# Patient Record
Sex: MALE | Race: WHITE | Employment: OTHER | URBAN - NONMETROPOLITAN AREA
[De-identification: names, ages, dates, MRNs, and addresses within clinical notes are randomized per-mention and may not be internally consistent; named-entity substitution may affect disease eponyms.]

---

## 2017-05-29 ENCOUNTER — APPOINTMENT (OUTPATIENT)
Dept: GENERAL RADIOLOGY | Age: 57
End: 2017-05-29
Payer: MEDICARE

## 2017-05-29 ENCOUNTER — HOSPITAL ENCOUNTER (EMERGENCY)
Age: 57
Discharge: HOME | End: 2017-05-29
Attending: EMERGENCY MEDICINE
Payer: MEDICARE

## 2017-05-29 VITALS
HEART RATE: 87 BPM | WEIGHT: 135 LBS | OXYGEN SATURATION: 96 % | RESPIRATION RATE: 28 BRPM | SYSTOLIC BLOOD PRESSURE: 154 MMHG | TEMPERATURE: 98.2 F | DIASTOLIC BLOOD PRESSURE: 81 MMHG | BODY MASS INDEX: 19.99 KG/M2 | HEIGHT: 69 IN

## 2017-05-29 DIAGNOSIS — J18.9 PNEUMONIA DUE TO ORGANISM: Primary | ICD-10-CM

## 2017-05-29 DIAGNOSIS — E87.1 HYPONATREMIA: ICD-10-CM

## 2017-05-29 DIAGNOSIS — J44.1 CHRONIC OBSTRUCTIVE PULMONARY DISEASE WITH ACUTE EXACERBATION (HCC): ICD-10-CM

## 2017-05-29 LAB
ALBUMIN SERPL-MCNC: 4.3 G/DL (ref 3.5–5.2)
ALP BLD-CCNC: 67 U/L (ref 40–130)
ALT SERPL-CCNC: 13 U/L (ref 5–41)
ANION GAP SERPL CALCULATED.3IONS-SCNC: 13 MMOL/L (ref 7–19)
AST SERPL-CCNC: 17 U/L (ref 5–40)
BASE EXCESS ARTERIAL: -0.5 MMOL/L (ref -2–2)
BASOPHILS ABSOLUTE: 0 K/UL (ref 0–0.2)
BASOPHILS RELATIVE PERCENT: 0.1 % (ref 0–1)
BILIRUB SERPL-MCNC: 0.8 MG/DL (ref 0.2–1.2)
BUN BLDV-MCNC: 6 MG/DL (ref 6–20)
CALCIUM SERPL-MCNC: 9.4 MG/DL (ref 8.6–10)
CARBOXYHEMOGLOBIN ARTERIAL: 7.7 % (ref 0–5)
CHLORIDE BLD-SCNC: 90 MMOL/L (ref 98–111)
CO2: 24 MMOL/L (ref 22–29)
CREAT SERPL-MCNC: 0.6 MG/DL (ref 0.5–1.2)
EOSINOPHILS ABSOLUTE: 0 K/UL (ref 0–0.6)
EOSINOPHILS RELATIVE PERCENT: 0 % (ref 0–5)
GFR NON-AFRICAN AMERICAN: >60
GLUCOSE BLD-MCNC: 125 MG/DL (ref 74–109)
HCO3 ARTERIAL: 23.1 MMOL/L (ref 22–26)
HCT VFR BLD CALC: 42.1 % (ref 42–52)
HEMOGLOBIN, ART, EXTENDED: 13.8 G/DL (ref 14–18)
HEMOGLOBIN: 14.2 G/DL (ref 14–18)
INR BLD: 1.05 (ref 0.88–1.18)
LACTIC ACID: 1.7 MG/DL (ref 0.5–1.9)
LYMPHOCYTES ABSOLUTE: 0.6 K/UL (ref 1.1–4.5)
LYMPHOCYTES RELATIVE PERCENT: 3.3 % (ref 20–40)
MCH RBC QN AUTO: 31.3 PG (ref 27–31)
MCHC RBC AUTO-ENTMCNC: 33.7 G/DL (ref 33–37)
MCV RBC AUTO: 92.9 FL (ref 80–94)
METHEMOGLOBIN ARTERIAL: 1.1 %
MONOCYTES ABSOLUTE: 1.4 K/UL (ref 0–0.9)
MONOCYTES RELATIVE PERCENT: 8.3 % (ref 0–10)
NEUTROPHILS ABSOLUTE: 14.9 K/UL (ref 1.5–7.5)
NEUTROPHILS RELATIVE PERCENT: 87.9 % (ref 50–65)
O2 CONTENT ARTERIAL: 17.5 ML/DL
O2 SAT, ARTERIAL: 90.2 %
O2 THERAPY: ABNORMAL
PCO2 ARTERIAL: 34 MMHG (ref 35–45)
PDW BLD-RTO: 13.2 % (ref 11.5–14.5)
PERFORMED ON: NORMAL
PH ARTERIAL: 7.44 (ref 7.35–7.45)
PLATELET # BLD: 246 K/UL (ref 130–400)
PMV BLD AUTO: 10.3 FL (ref 7.4–10.4)
PO2 ARTERIAL: 78 MMHG (ref 80–100)
POC TROPONIN I: 0 NG/ML (ref 0–0.08)
POTASSIUM SERPL-SCNC: 4.1 MMOL/L (ref 3.5–5)
POTASSIUM, WHOLE BLOOD: 3.9
PRO-BNP: 169 PG/ML (ref 0–900)
PROTHROMBIN TIME: 13.6 SEC (ref 12–14.6)
RBC # BLD: 4.53 M/UL (ref 4.7–6.1)
SODIUM BLD-SCNC: 127 MMOL/L (ref 136–145)
TOTAL PROTEIN: 7.3 G/DL (ref 6.6–8.7)
WBC # BLD: 16.9 K/UL (ref 4.8–10.8)

## 2017-05-29 PROCEDURE — 96365 THER/PROPH/DIAG IV INF INIT: CPT

## 2017-05-29 PROCEDURE — 85025 COMPLETE CBC W/AUTO DIFF WBC: CPT

## 2017-05-29 PROCEDURE — 93005 ELECTROCARDIOGRAM TRACING: CPT

## 2017-05-29 PROCEDURE — 83880 ASSAY OF NATRIURETIC PEPTIDE: CPT

## 2017-05-29 PROCEDURE — 80053 COMPREHEN METABOLIC PANEL: CPT

## 2017-05-29 PROCEDURE — 96375 TX/PRO/DX INJ NEW DRUG ADDON: CPT

## 2017-05-29 PROCEDURE — 85610 PROTHROMBIN TIME: CPT

## 2017-05-29 PROCEDURE — 99284 EMERGENCY DEPT VISIT MOD MDM: CPT | Performed by: EMERGENCY MEDICINE

## 2017-05-29 PROCEDURE — 87040 BLOOD CULTURE FOR BACTERIA: CPT

## 2017-05-29 PROCEDURE — 84484 ASSAY OF TROPONIN QUANT: CPT

## 2017-05-29 PROCEDURE — 99285 EMERGENCY DEPT VISIT HI MDM: CPT

## 2017-05-29 PROCEDURE — 36600 WITHDRAWAL OF ARTERIAL BLOOD: CPT

## 2017-05-29 PROCEDURE — 36415 COLL VENOUS BLD VENIPUNCTURE: CPT

## 2017-05-29 PROCEDURE — 96366 THER/PROPH/DIAG IV INF ADDON: CPT

## 2017-05-29 PROCEDURE — 82803 BLOOD GASES ANY COMBINATION: CPT

## 2017-05-29 PROCEDURE — 94640 AIRWAY INHALATION TREATMENT: CPT

## 2017-05-29 PROCEDURE — 71020 XR CHEST STANDARD TWO VW: CPT

## 2017-05-29 PROCEDURE — 84132 ASSAY OF SERUM POTASSIUM: CPT

## 2017-05-29 PROCEDURE — 83605 ASSAY OF LACTIC ACID: CPT

## 2017-05-29 PROCEDURE — 2580000003 HC RX 258: Performed by: EMERGENCY MEDICINE

## 2017-05-29 PROCEDURE — 6370000000 HC RX 637 (ALT 250 FOR IP): Performed by: EMERGENCY MEDICINE

## 2017-05-29 PROCEDURE — 6360000002 HC RX W HCPCS: Performed by: EMERGENCY MEDICINE

## 2017-05-29 RX ORDER — LEVOFLOXACIN 750 MG/1
750 TABLET ORAL DAILY
Qty: 10 TABLET | Refills: 0 | Status: SHIPPED | OUTPATIENT
Start: 2017-05-29 | End: 2017-06-08

## 2017-05-29 RX ORDER — 0.9 % SODIUM CHLORIDE 0.9 %
1000 INTRAVENOUS SOLUTION INTRAVENOUS ONCE
Status: COMPLETED | OUTPATIENT
Start: 2017-05-29 | End: 2017-05-29

## 2017-05-29 RX ORDER — PREDNISONE 50 MG/1
50 TABLET ORAL DAILY
Qty: 5 TABLET | Refills: 0 | Status: SHIPPED | OUTPATIENT
Start: 2017-05-29 | End: 2017-06-03

## 2017-05-29 RX ORDER — METHYLPREDNISOLONE SODIUM SUCCINATE 125 MG/2ML
80 INJECTION, POWDER, LYOPHILIZED, FOR SOLUTION INTRAMUSCULAR; INTRAVENOUS ONCE
Status: COMPLETED | OUTPATIENT
Start: 2017-05-29 | End: 2017-05-29

## 2017-05-29 RX ORDER — IPRATROPIUM BROMIDE AND ALBUTEROL SULFATE 2.5; .5 MG/3ML; MG/3ML
1 SOLUTION RESPIRATORY (INHALATION) ONCE
Status: COMPLETED | OUTPATIENT
Start: 2017-05-29 | End: 2017-05-29

## 2017-05-29 RX ORDER — LEVOFLOXACIN 5 MG/ML
500 INJECTION, SOLUTION INTRAVENOUS ONCE
Status: COMPLETED | OUTPATIENT
Start: 2017-05-29 | End: 2017-05-29

## 2017-05-29 RX ADMIN — METHYLPREDNISOLONE SODIUM SUCCINATE 80 MG: 125 INJECTION, POWDER, FOR SOLUTION INTRAMUSCULAR; INTRAVENOUS at 11:34

## 2017-05-29 RX ADMIN — LEVOFLOXACIN 500 MG: 5 INJECTION, SOLUTION INTRAVENOUS at 11:36

## 2017-05-29 RX ADMIN — IPRATROPIUM BROMIDE AND ALBUTEROL SULFATE 1 AMPULE: .5; 3 SOLUTION RESPIRATORY (INHALATION) at 09:11

## 2017-05-29 RX ADMIN — SODIUM CHLORIDE 1000 ML: 9 INJECTION, SOLUTION INTRAVENOUS at 11:33

## 2017-05-29 ASSESSMENT — ENCOUNTER SYMPTOMS
ABDOMINAL PAIN: 0
SHORTNESS OF BREATH: 1
COUGH: 1
BACK PAIN: 0

## 2017-05-31 LAB
EKG P AXIS: 79 DEGREES
EKG P-R INTERVAL: 148 MS
EKG Q-T INTERVAL: 346 MS
EKG QRS DURATION: 84 MS
EKG QTC CALCULATION (BAZETT): 409 MS
EKG T AXIS: 82 DEGREES

## 2017-06-03 LAB
BLOOD CULTURE, ROUTINE: NORMAL
CULTURE, BLOOD 2: NORMAL

## 2017-06-06 ENCOUNTER — HOSPITAL ENCOUNTER (OUTPATIENT)
Dept: CT IMAGING | Age: 57
Discharge: HOME | End: 2017-06-06
Payer: MEDICARE

## 2017-06-06 DIAGNOSIS — J44.9 CHRONIC OBSTRUCTIVE PULMONARY DISEASE, UNSPECIFIED COPD TYPE (HCC): ICD-10-CM

## 2017-06-06 PROCEDURE — 71250 CT THORAX DX C-: CPT

## 2017-10-10 ENCOUNTER — HOSPITAL ENCOUNTER (OUTPATIENT)
Dept: CT IMAGING | Age: 57
Discharge: HOME | End: 2017-10-10
Payer: MEDICARE

## 2017-10-10 DIAGNOSIS — J96.11 CHRONIC RESPIRATORY FAILURE WITH HYPOXIA (HCC): ICD-10-CM

## 2017-10-10 PROCEDURE — 71250 CT THORAX DX C-: CPT

## 2018-07-05 ENCOUNTER — HOSPITAL ENCOUNTER (OUTPATIENT)
Dept: CT IMAGING | Age: 58
Discharge: HOME | End: 2018-07-05
Payer: MEDICARE

## 2018-07-05 ENCOUNTER — HOSPITAL ENCOUNTER (OUTPATIENT)
Dept: GENERAL RADIOLOGY | Age: 58
Discharge: HOME | End: 2018-07-05
Payer: MEDICARE

## 2018-07-05 DIAGNOSIS — Z12.5 ENCOUNTER FOR PROSTATE CANCER SCREENING: ICD-10-CM

## 2018-07-05 DIAGNOSIS — I10 ESSENTIAL HYPERTENSION, MALIGNANT: ICD-10-CM

## 2018-07-05 DIAGNOSIS — E78.2 MIXED HYPERLIPIDEMIA: ICD-10-CM

## 2018-07-05 DIAGNOSIS — R63.4 LOSS OF WEIGHT: ICD-10-CM

## 2018-07-05 DIAGNOSIS — J44.9 OBSTRUCTIVE CHRONIC BRONCHITIS WITHOUT EXACERBATION (HCC): ICD-10-CM

## 2018-07-05 LAB
GFR NON-AFRICAN AMERICAN: >60
PERFORMED ON: NORMAL
POC CREATININE: 0.6 MG/DL (ref 0.3–1.3)
POC SAMPLE TYPE: NORMAL

## 2018-07-05 PROCEDURE — 82565 ASSAY OF CREATININE: CPT

## 2018-07-05 PROCEDURE — 71046 X-RAY EXAM CHEST 2 VIEWS: CPT

## 2018-07-05 PROCEDURE — 74178 CT ABD&PLV WO CNTR FLWD CNTR: CPT

## 2018-07-05 PROCEDURE — 6360000004 HC RX CONTRAST MEDICATION: Performed by: FAMILY MEDICINE

## 2018-07-05 RX ADMIN — IOPAMIDOL 90 ML: 755 INJECTION, SOLUTION INTRAVENOUS at 13:03

## 2018-07-10 ENCOUNTER — HOSPITAL ENCOUNTER (OUTPATIENT)
Dept: ULTRASOUND IMAGING | Age: 58
Discharge: HOME | End: 2018-07-10
Payer: MEDICARE

## 2018-07-10 ENCOUNTER — HOSPITAL ENCOUNTER (OUTPATIENT)
Dept: CT IMAGING | Age: 58
Discharge: HOME | End: 2018-07-10
Payer: MEDICARE

## 2018-07-10 DIAGNOSIS — R93.5 ABNORMAL FINDINGS ON DIAGNOSTIC IMAGING OF ABDOMEN: ICD-10-CM

## 2018-07-10 DIAGNOSIS — J42 CHRONIC BRONCHITIS, UNSPECIFIED CHRONIC BRONCHITIS TYPE (HCC): ICD-10-CM

## 2018-07-10 PROCEDURE — 71250 CT THORAX DX C-: CPT

## 2018-07-10 PROCEDURE — 76705 ECHO EXAM OF ABDOMEN: CPT

## 2018-08-07 ENCOUNTER — HOSPITAL ENCOUNTER (INPATIENT)
Age: 58
LOS: 6 days | Discharge: HOME | DRG: 193 | End: 2018-08-14
Attending: EMERGENCY MEDICINE | Admitting: FAMILY MEDICINE
Payer: MEDICARE

## 2018-08-07 ENCOUNTER — APPOINTMENT (OUTPATIENT)
Dept: CT IMAGING | Age: 58
DRG: 193 | End: 2018-08-07
Payer: MEDICARE

## 2018-08-07 ENCOUNTER — APPOINTMENT (OUTPATIENT)
Dept: GENERAL RADIOLOGY | Age: 58
DRG: 193 | End: 2018-08-07
Payer: MEDICARE

## 2018-08-07 DIAGNOSIS — R93.89 ABNORMAL CT SCAN: ICD-10-CM

## 2018-08-07 DIAGNOSIS — J18.9 PNEUMONIA DUE TO ORGANISM: ICD-10-CM

## 2018-08-07 DIAGNOSIS — R06.00 DYSPNEA, UNSPECIFIED TYPE: Primary | ICD-10-CM

## 2018-08-07 LAB
ALBUMIN SERPL-MCNC: 4.1 G/DL (ref 3.5–5.2)
ALP BLD-CCNC: 85 U/L (ref 40–130)
ALT SERPL-CCNC: 16 U/L (ref 5–41)
ANION GAP SERPL CALCULATED.3IONS-SCNC: 11 MMOL/L (ref 7–19)
AST SERPL-CCNC: 19 U/L (ref 5–40)
BASE EXCESS ARTERIAL: 3 MMOL/L (ref -2–2)
BILIRUB SERPL-MCNC: 0.6 MG/DL (ref 0.2–1.2)
BUN BLDV-MCNC: 5 MG/DL (ref 8–23)
CALCIUM SERPL-MCNC: 9.8 MG/DL (ref 8.8–10.2)
CARBOXYHEMOGLOBIN ARTERIAL: 7.8 % (ref 0–5)
CHLORIDE BLD-SCNC: 92 MMOL/L (ref 98–111)
CO2: 29 MMOL/L (ref 22–29)
CREAT SERPL-MCNC: 0.5 MG/DL (ref 0.5–1.2)
D DIMER: 0.58 UG/ML FEU (ref 0–0.48)
GFR NON-AFRICAN AMERICAN: >60
GLUCOSE BLD-MCNC: 104 MG/DL (ref 74–109)
HCO3 ARTERIAL: 27.1 MMOL/L (ref 22–26)
HCT VFR BLD CALC: 42.7 % (ref 42–52)
HEMOGLOBIN, ART, EXTENDED: 12.3 G/DL (ref 14–18)
HEMOGLOBIN: 13.6 G/DL (ref 14–18)
MCH RBC QN AUTO: 28.5 PG (ref 27–31)
MCHC RBC AUTO-ENTMCNC: 31.9 G/DL (ref 33–37)
MCV RBC AUTO: 89.5 FL (ref 80–94)
METHEMOGLOBIN ARTERIAL: 1.7 %
O2 CONTENT ARTERIAL: 15.6 ML/DL
O2 SAT, ARTERIAL: 89.5 %
O2 THERAPY: ABNORMAL
PCO2 ARTERIAL: 39 MMHG (ref 35–45)
PDW BLD-RTO: 13.4 % (ref 11.5–14.5)
PH ARTERIAL: 7.45 (ref 7.35–7.45)
PLATELET # BLD: 303 K/UL (ref 130–400)
PMV BLD AUTO: 10.4 FL (ref 9.4–12.4)
PO2 ARTERIAL: 92 MMHG (ref 80–100)
POTASSIUM SERPL-SCNC: 4.1 MMOL/L (ref 3.5–5)
POTASSIUM, WHOLE BLOOD: 3.8
RBC # BLD: 4.77 M/UL (ref 4.7–6.1)
SODIUM BLD-SCNC: 132 MMOL/L (ref 136–145)
TOTAL PROTEIN: 7.3 G/DL (ref 6.6–8.7)
TROPONIN: <0.01 NG/ML (ref 0–0.03)
WBC # BLD: 14.5 K/UL (ref 4.8–10.8)

## 2018-08-07 PROCEDURE — 93005 ELECTROCARDIOGRAM TRACING: CPT

## 2018-08-07 PROCEDURE — 85379 FIBRIN DEGRADATION QUANT: CPT

## 2018-08-07 PROCEDURE — 36415 COLL VENOUS BLD VENIPUNCTURE: CPT

## 2018-08-07 PROCEDURE — 36600 WITHDRAWAL OF ARTERIAL BLOOD: CPT

## 2018-08-07 PROCEDURE — 71046 X-RAY EXAM CHEST 2 VIEWS: CPT

## 2018-08-07 PROCEDURE — 85027 COMPLETE CBC AUTOMATED: CPT

## 2018-08-07 PROCEDURE — 80053 COMPREHEN METABOLIC PANEL: CPT

## 2018-08-07 PROCEDURE — 71275 CT ANGIOGRAPHY CHEST: CPT

## 2018-08-07 PROCEDURE — 84484 ASSAY OF TROPONIN QUANT: CPT

## 2018-08-07 PROCEDURE — 99285 EMERGENCY DEPT VISIT HI MDM: CPT

## 2018-08-07 PROCEDURE — 6360000004 HC RX CONTRAST MEDICATION: Performed by: EMERGENCY MEDICINE

## 2018-08-07 RX ADMIN — IOPAMIDOL 90 ML: 755 INJECTION, SOLUTION INTRAVENOUS at 23:57

## 2018-08-08 PROBLEM — R63.4 ABNORMAL WEIGHT LOSS: Status: ACTIVE | Noted: 2018-08-08

## 2018-08-08 PROBLEM — J18.9 PNEUMONIA: Status: ACTIVE | Noted: 2018-08-08

## 2018-08-08 LAB
BILIRUBIN URINE: NEGATIVE
BLOOD, URINE: NEGATIVE
CLARITY: CLEAR
COLOR: YELLOW
GLUCOSE URINE: NEGATIVE MG/DL
KETONES, URINE: NEGATIVE MG/DL
LEUKOCYTE ESTERASE, URINE: NEGATIVE
NITRITE, URINE: NEGATIVE
PH UA: 7.5
PREALBUMIN: 11 MG/DL (ref 20–40)
PROTEIN UA: NEGATIVE MG/DL
SPECIFIC GRAVITY UA: 1.01
URINE REFLEX TO CULTURE: NORMAL
UROBILINOGEN, URINE: 1 E.U./DL

## 2018-08-08 PROCEDURE — 94762 N-INVAS EAR/PLS OXIMTRY CONT: CPT

## 2018-08-08 PROCEDURE — 6360000002 HC RX W HCPCS: Performed by: EMERGENCY MEDICINE

## 2018-08-08 PROCEDURE — 1210000000 HC MED SURG R&B

## 2018-08-08 PROCEDURE — 2580000003 HC RX 258: Performed by: EMERGENCY MEDICINE

## 2018-08-08 PROCEDURE — 84134 ASSAY OF PREALBUMIN: CPT

## 2018-08-08 PROCEDURE — 87070 CULTURE OTHR SPECIMN AEROBIC: CPT

## 2018-08-08 PROCEDURE — 36415 COLL VENOUS BLD VENIPUNCTURE: CPT

## 2018-08-08 PROCEDURE — 87385 HISTOPLASMA CAPSUL AG IA: CPT

## 2018-08-08 PROCEDURE — 81003 URINALYSIS AUTO W/O SCOPE: CPT

## 2018-08-08 PROCEDURE — 84132 ASSAY OF SERUM POTASSIUM: CPT

## 2018-08-08 PROCEDURE — 87449 NOS EACH ORGANISM AG IA: CPT

## 2018-08-08 PROCEDURE — 94640 AIRWAY INHALATION TREATMENT: CPT

## 2018-08-08 PROCEDURE — 99285 EMERGENCY DEPT VISIT HI MDM: CPT | Performed by: EMERGENCY MEDICINE

## 2018-08-08 PROCEDURE — 6370000000 HC RX 637 (ALT 250 FOR IP): Performed by: FAMILY MEDICINE

## 2018-08-08 PROCEDURE — 96374 THER/PROPH/DIAG INJ IV PUSH: CPT

## 2018-08-08 PROCEDURE — 87205 SMEAR GRAM STAIN: CPT

## 2018-08-08 PROCEDURE — 2700000000 HC OXYGEN THERAPY PER DAY

## 2018-08-08 PROCEDURE — 87040 BLOOD CULTURE FOR BACTERIA: CPT

## 2018-08-08 PROCEDURE — 2580000003 HC RX 258: Performed by: FAMILY MEDICINE

## 2018-08-08 PROCEDURE — 6360000002 HC RX W HCPCS: Performed by: FAMILY MEDICINE

## 2018-08-08 PROCEDURE — 82803 BLOOD GASES ANY COMBINATION: CPT

## 2018-08-08 RX ORDER — NICOTINE 21 MG/24HR
1 PATCH, TRANSDERMAL 24 HOURS TRANSDERMAL DAILY
Status: DISCONTINUED | OUTPATIENT
Start: 2018-08-08 | End: 2018-08-14 | Stop reason: HOSPADM

## 2018-08-08 RX ORDER — SODIUM CHLORIDE 0.9 % (FLUSH) 0.9 %
10 SYRINGE (ML) INJECTION PRN
Status: DISCONTINUED | OUTPATIENT
Start: 2018-08-08 | End: 2018-08-14 | Stop reason: HOSPADM

## 2018-08-08 RX ORDER — CITALOPRAM 40 MG/1
40 TABLET ORAL DAILY
Status: DISCONTINUED | OUTPATIENT
Start: 2018-08-08 | End: 2018-08-10

## 2018-08-08 RX ORDER — ALBUTEROL SULFATE 2.5 MG/3ML
2.5 SOLUTION RESPIRATORY (INHALATION) EVERY 6 HOURS PRN
Status: DISCONTINUED | OUTPATIENT
Start: 2018-08-08 | End: 2018-08-14 | Stop reason: HOSPADM

## 2018-08-08 RX ORDER — LORAZEPAM 0.5 MG/1
0.5 TABLET ORAL EVERY 4 HOURS PRN
Status: DISCONTINUED | OUTPATIENT
Start: 2018-08-08 | End: 2018-08-14 | Stop reason: HOSPADM

## 2018-08-08 RX ORDER — GUAIFENESIN 600 MG/1
1200 TABLET, EXTENDED RELEASE ORAL 2 TIMES DAILY
Status: DISCONTINUED | OUTPATIENT
Start: 2018-08-08 | End: 2018-08-14 | Stop reason: HOSPADM

## 2018-08-08 RX ORDER — IPRATROPIUM BROMIDE AND ALBUTEROL SULFATE 2.5; .5 MG/3ML; MG/3ML
1 SOLUTION RESPIRATORY (INHALATION)
Status: DISCONTINUED | OUTPATIENT
Start: 2018-08-08 | End: 2018-08-14 | Stop reason: HOSPADM

## 2018-08-08 RX ORDER — LISINOPRIL 10 MG/1
10 TABLET ORAL DAILY
Status: DISCONTINUED | OUTPATIENT
Start: 2018-08-08 | End: 2018-08-10

## 2018-08-08 RX ORDER — ACETAMINOPHEN 325 MG/1
650 TABLET ORAL EVERY 4 HOURS PRN
Status: DISCONTINUED | OUTPATIENT
Start: 2018-08-08 | End: 2018-08-14 | Stop reason: HOSPADM

## 2018-08-08 RX ORDER — METHYLPREDNISOLONE SODIUM SUCCINATE 125 MG/2ML
80 INJECTION, POWDER, LYOPHILIZED, FOR SOLUTION INTRAMUSCULAR; INTRAVENOUS EVERY 8 HOURS
Status: DISCONTINUED | OUTPATIENT
Start: 2018-08-08 | End: 2018-08-10

## 2018-08-08 RX ORDER — SODIUM CHLORIDE 0.9 % (FLUSH) 0.9 %
10 SYRINGE (ML) INJECTION EVERY 12 HOURS SCHEDULED
Status: DISCONTINUED | OUTPATIENT
Start: 2018-08-08 | End: 2018-08-14 | Stop reason: HOSPADM

## 2018-08-08 RX ORDER — LEVOFLOXACIN 5 MG/ML
750 INJECTION, SOLUTION INTRAVENOUS ONCE
Status: COMPLETED | OUTPATIENT
Start: 2018-08-08 | End: 2018-08-08

## 2018-08-08 RX ADMIN — CEFEPIME HYDROCHLORIDE 2 G: 2 INJECTION, POWDER, FOR SOLUTION INTRAVENOUS at 01:24

## 2018-08-08 RX ADMIN — IPRATROPIUM BROMIDE AND ALBUTEROL SULFATE 1 AMPULE: .5; 3 SOLUTION RESPIRATORY (INHALATION) at 15:39

## 2018-08-08 RX ADMIN — IPRATROPIUM BROMIDE AND ALBUTEROL SULFATE 1 AMPULE: .5; 3 SOLUTION RESPIRATORY (INHALATION) at 07:53

## 2018-08-08 RX ADMIN — Medication 2 G: at 10:26

## 2018-08-08 RX ADMIN — ROFLUMILAST 500 MCG: 500 TABLET ORAL at 11:15

## 2018-08-08 RX ADMIN — IPRATROPIUM BROMIDE AND ALBUTEROL SULFATE 1 AMPULE: .5; 3 SOLUTION RESPIRATORY (INHALATION) at 11:07

## 2018-08-08 RX ADMIN — Medication 2 G: at 17:48

## 2018-08-08 RX ADMIN — ALBUTEROL SULFATE 2.5 MG: 2.5 SOLUTION RESPIRATORY (INHALATION) at 05:18

## 2018-08-08 RX ADMIN — Medication 10 ML: at 20:42

## 2018-08-08 RX ADMIN — IPRATROPIUM BROMIDE AND ALBUTEROL SULFATE 1 AMPULE: .5; 3 SOLUTION RESPIRATORY (INHALATION) at 18:17

## 2018-08-08 RX ADMIN — CITALOPRAM HYDROBROMIDE 40 MG: 40 TABLET ORAL at 10:31

## 2018-08-08 RX ADMIN — Medication 10 ML: at 10:32

## 2018-08-08 RX ADMIN — GUAIFENESIN 1200 MG: 600 TABLET, EXTENDED RELEASE ORAL at 20:42

## 2018-08-08 RX ADMIN — GUAIFENESIN 1200 MG: 600 TABLET, EXTENDED RELEASE ORAL at 10:25

## 2018-08-08 RX ADMIN — LEVOFLOXACIN 750 MG: 5 INJECTION, SOLUTION INTRAVENOUS at 01:25

## 2018-08-08 RX ADMIN — LORAZEPAM 0.5 MG: 0.5 TABLET ORAL at 20:42

## 2018-08-08 RX ADMIN — METHYLPREDNISOLONE SODIUM SUCCINATE 80 MG: 125 INJECTION, POWDER, FOR SOLUTION INTRAMUSCULAR; INTRAVENOUS at 17:47

## 2018-08-08 RX ADMIN — METHYLPREDNISOLONE SODIUM SUCCINATE 80 MG: 125 INJECTION, POWDER, FOR SOLUTION INTRAMUSCULAR; INTRAVENOUS at 10:26

## 2018-08-08 ASSESSMENT — ENCOUNTER SYMPTOMS
EYE PAIN: 0
DIARRHEA: 0
SHORTNESS OF BREATH: 1
VOMITING: 0
ABDOMINAL PAIN: 0

## 2018-08-08 NOTE — H&P
Inhale 1 puff into the lungs daily   mometasone-formoterol (DULERA) 200-5 MCG/ACT inhaler, Inhale 2 puffs into the lungs 2 times daily  Roflumilast (DALIRESP) 500 MCG tablet, Take 500 mcg by mouth daily    Allergies:    Pcn [penicillins]    Social History:    reports that he has been smoking Cigarettes. He has a 30.00 pack-year smoking history. He has never used smokeless tobacco. He reports that he does not drink alcohol or use drugs. Family History:   family history includes Colon Cancer in his mother; Colon Polyps in his mother; Diabetes in his father; Emphysema in his father; Prostate Cancer in his paternal grandfather. REVIEW OF SYSTEMS:  As above in the HPI, otherwise negative    PHYSICAL EXAM:    Vitals:  BP 97/62   Pulse 91   Temp 98.7 °F (37.1 °C) (Temporal)   Resp 18   Ht 5' 9\" (1.753 m)   Wt 112 lb (50.8 kg)   SpO2 96%   BMI 16.54 kg/m²     General Appearance:  alert, cooperative and appears older than stated age  Skin:  negatives: mobility and turgor normal  Eyes:  No gross abnormalities. Neck:  neck- supple, no mass, non-tender  Lungs:  Breathing Pattern: use of accessory muscles, Breath sounds: diminished breath sounds- throughout and diffuse  Heart:  Heart regular rate and rhythm  Abdomen: Auscultation: Normal bowel sounds. No bruits. Extremities: Extremities warm to touch, pink, with no edema.   Peripheral Pulses:  Normal  Neurologic:  negative    DATA:  CBC with Differential:    Lab Results   Component Value Date    WBC 14.5 08/07/2018    RBC 4.77 08/07/2018    HGB 13.6 08/07/2018    HCT 42.7 08/07/2018    HCT 46.5 02/09/2012     08/07/2018    PLT 90 02/09/2012    MCV 89.5 08/07/2018    MCH 28.5 08/07/2018    MCHC 31.9 08/07/2018    RDW 13.4 08/07/2018    LYMPHOPCT 3.3 05/29/2017    MONOPCT 8.3 05/29/2017    EOSPCT 0.8 02/09/2012    BASOPCT 0.1 05/29/2017    MONOSABS 1.40 05/29/2017    LYMPHSABS 0.6 05/29/2017    EOSABS 0.00 05/29/2017    BASOSABS 0.00 05/29/2017     CMP:

## 2018-08-08 NOTE — CONSULTS
chloride flush 10 mL Intravenous 2 times per day   enoxaparin 40 mg Subcutaneous Daily   Roflumilast 500 mcg Oral Daily   citalopram 40 mg Oral Daily   guaiFENesin 1,200 mg Oral BID   lisinopril 10 mg Oral Daily   nicotine 1 patch Transdermal Daily   ipratropium-albuterol 1 ampule Inhalation Q4H WA   cefepime 2 g Intravenous Q8H   methylPREDNISolone 80 mg Intravenous Q8H       Social History   reports that he has been smoking Cigarettes. He has a 30.00 pack-year smoking history. He has never used smokeless tobacco. He reports that he does not drink alcohol or use drugs. Family History  family history includes Colon Cancer in his mother; Colon Polyps in his mother; Diabetes in his father; Emphysema in his father; Prostate Cancer in his paternal grandfather. Review of Systems:  ROS   Constitutional: He denies fever or chills. He has had weight loss. HEENT: Negative. Eyes: Negative. Respiratory: He has had dyspnea and some cough but very minimal productive cough. Cardiac: Negative. GI: He has had weight loss. Extremities: Negative. Musculoskeletal: Negative. Neurologic: Negative. Neurologic: Negative. Psychiatric: He has been somewhat depressed recently as his mother had just passed away. Physical Exam:   height is 5' 9\" (1.753 m) and weight is 112 lb (50.8 kg). His temporal temperature is 98.7 °F (37.1 °C). His blood pressure is 97/62 and his pulse is 91. His respiration is 18 and oxygen saturation is 96%. Intake/Output Summary (Last 24 hours) at 08/08/18 1011  Last data filed at 08/08/18 0116   Gross per 24 hour   Intake                0 ml   Output              500 ml   Net             -500 ml     Physical Exam   Gen.: He is a chronically ill-appearing white male. HEENT: Nasal oxygen is in place. Eyes: Pupils are equal round reactive to light. Neck: Supple without JVD. Chest: Diminished breath sounds throughout some scattered expiratory rhonchi. Cardiac: No murmur or gallop noted.   Abdomen: fibrosis. Signed by Dr Taylor Fisher. Juan Miguelhoose on 8/8/2018 7:10 AM    Ct Chest Wo Contrast    Result Date: 7/10/2018  CT CHEST WO CONTRAST 7/10/2018 7:37 AM History: Bronchitis. Shortness of breath. In order to have a CT radiation dose as low as reasonably achievable Automated Exposure Control was utilized for adjustment of the mA and/or KV according to patient size. DLP in mGycm= 526. Noncontrast chest CT compared with 10/10/2017. Hyperexpansion. Significant progression in posterior right upper lobe pleural thickening and parenchymal opacification. Associated cavitary change. Associated bronchiectasis. No adjacent rib destruction. Chronic pleural thickening with calcified pleural plaques noted. Bilateral upper lobe scarring. No significant pleural effusion. Heart size is normal.    1. Posterior right upper lobe pleural thickening with parenchymal consolidation and associated cavitary change and bronchiectasis. 2. Chronic pleural disease with pneumonia favored over neoplasm though this represents significant progression as compared with 9 months ago. No pneumothorax or heart failure. No discrete new lung nodule or mass. Signed by Dr Lincoln Olivares on 7/10/2018 7:41 AM    Us Liver    Result Date: 7/10/2018  US LIVER 7/10/2018 7:30 AM History: Liver lesion on recent CT. Grayscale and color flow ultrasound evaluation of the liver shows an irregular 2-3 cm hyperechoic focus within the subdiaphragmatic left lobe which has the appearance of focal fatty infiltration. No significant hypervascularity at this site. Normal right kidney measuring 10.4 x 3.5 x 5.2 cm. Normal appearance of the spleen measuring approximately 9 x 4 cm. 1. The liver \"lesion\" noted on recent CT is felt to represent focal fatty infiltration. 2. No aggressive features. Signed by Dr Lincoln Olivares on 7/10/2018 7:36 AM    Cta Pulmonary W Contrast    Result Date: 8/8/2018  Examination. CTA PULMONARY W CONTRAST History: Acute chest pain.  DLP: 350 mGyc. This CT angiography of the chest is performed after intravenous contrast enhancement. The images are acquired in axial plane with subsequent reconstruction in coronal and sagittal planes. The comparison is made with the previous study dated 7/10/2018. There is excellent opacification of the main pulmonary artery, right and left pulmonary arteries and their branches bilaterally. There is no filling defect in the opacified pulmonary arterial bed. Atheromatous changes of the thoracic aorta are seen. No aneurysmal dilatation or dissection. Atheromatous changes of coronary arteries are seen. The RV/LV ratio is less than 1. No right ventricle strain. There is small pericardial effusion. There is no evidence of mediastinal or hilar mass or lymphadenopathy. The remaining lung fields are essentially unchanged since the previous study. A spiculated nodule in the left upper lobe posteriorly is reidentified with no change in size or shape. There is surrounding linear parenchymal strands extending towards the right hilum and the visceral pleura. An area of consolidation with cavitation is seen in the superior segment of the right lower lobe which is stable since the previous study. A bandlike area of parenchymal density with intrinsic calcification in the left apex posteriorly is stable. There are extensive chronic emphysematous changes. There are linear interstitial changes in the right upper lobe. Similar changes are seen in the posterior aspect of the right middle lobe. A few calcified granulomas are seen in the lungs bilaterally. The visualized thyroid gland appears normal. There is no axillary lymphadenopathy. The liver and spleen as visualized appear normal. Moderate fullness of left adrenal gland is stable. Chronic degenerative changes of the thoracic spine is seen with a mild dextroscoliosis. No evidence of pulmonary embolism. Atheromatous changes of thoracic aorta. No dissection.  Atheromatous changes of coronary arteries. The lungs bilaterally are essentially unchanged since the previous study dated 7/10/2018. The above study was initially reviewed and reported by [facility]. I do not find any discrepancies. Signed by Dr Carroll Leggett on 2018 7:38 AM    My radiograph interpretation/independent review of imaging: Chest CT shows severe fibrotic changes in the upper lung zones with cavitary component on the right. He also has emphysematous changes bilaterally. I don't sense any acute infiltrate and no pulmonary embolism was noted. Other test results (not lab or imaging): His most recent pulmonary function studies from the office showed a very severe obstructive ventilatory defect. In office notes were reviewed. Independent review of ekg: EKG reveals sinus rhythm with low voltage QRS right axis deviation. Problem list generated by [employer]:  Patient Active Problem List   Diagnosis    Family history of colon cancer    Family history of colonic polyps    Benign colon polyp    S/P colonoscopic polypectomy    Diverticulosis of colon    Loose stools    Abdominal cramping    History of colon polyps    Abnormal CT of the abdomen    Panlobular emphysema ([address]    Hyperlipidemia    Hypertension    Nicotine dependence, cigarettes, uncomplicated    Acute exacerbation of chronic obstructive airways disease ([address]    COPD (chronic obstructive pulmonary disease) ([address]    Pneumonia of right lower lobe due to Pseudomonas species ([address]    Pneumonia    Abnormal weight loss     Pulmonary Assessment:  New problem (to me), with additional workup planned:   1. Acute exacerbation of COPD. 2. Lung scarring. New problem (to me), no additional workup planned:  1. None. Other problems either stable, failing to improve or worsenin. Chronic respiratory failure with hypoxemia. 2. Chronic tobacco use.   3. Weight loss which may relate to his severe COPD    Recommend/plan:   · There was some question about possible bronchoscopy. He is clearly a very poor candidate for any invasive pulmonary workup based on the severity of COPD. I don't really see anything on his chest CT that would be accessible to standard bronchoscopy in terms of a biopsy. Again he would be a poor candidate for any sort of invasive pulmonary workup. I will get some serologic studies just to assess him for some possible superimposed process contributing to his chronic lung disease but again most likely has a combination of scarring from previous infectious or inflammatory process along with emphysema related to his chronic and persistent tobacco use and his prognosis is extremely poor.     Electronically signed by Markell Dodson on 08/08/18 at 10:11 AM

## 2018-08-08 NOTE — ED NOTES
ASSESSMENT:    PT ALERT/ORIENTED X4. PUPILS EQUAL/REACTIVE    SKIN:  WARM/DRY PINK CAPILLARY REFILL < 2SECS    CARDIAC:  S1 S2 NOTED. Patient placed on cardiac monitor, continuous pulse oximeter, and NIBP monitor. Monitor alarms on. LUNGS: CLEAR UPPER AND LOWER LOBES, PT C/O BECOMING OUT OF BREATH WITH MINIMAL ACTIVITY. PT ON HOME O2.      ABDOMEN: BOWEL SOUNDS NOTED UPPER AND LOWER QUADRANTS                     SOFT AND NONTENDER. EXTREMITIES:  BILATERAL DP AND PT AND NO EDEMA NOTED. NO DISTRESS NOTED. SIDE RAILS UP AND CALL LIGHT IN REACH.      Mady Dalal RN  08/07/18 5050

## 2018-08-08 NOTE — ED PROVIDER NOTES
may be scar or malignancy. Combination of consolidation, infiltration and presumed cavitation is in the right lung. Some scarring in the upper lung fields. Small pericardial effusion. Small right pleural effusion. Radiologist: Wiliam Stephens M.D.       LABS:  Labs Reviewed   CBC - Abnormal; Notable for the following:        Result Value    WBC 14.5 (*)     Hemoglobin 13.6 (*)     MCHC 31.9 (*)     All other components within normal limits   COMPREHENSIVE METABOLIC PANEL - Abnormal; Notable for the following:     Sodium 132 (*)     Chloride 92 (*)     BUN 5 (*)     All other components within normal limits   D-DIMER, QUANTITATIVE - Abnormal; Notable for the following:     D-Dimer, Quant 0.58 (*)     All other components within normal limits   BLOOD GAS, ARTERIAL - Abnormal; Notable for the following:     HCO3, Arterial 27.1 (*)     Base Excess, Arterial 3.0 (*)     Hemoglobin, Art, Extended 12.3 (*)     O2 Sat, Arterial 89.5 (*)     Carboxyhgb, Arterial 7.8 (*)     All other components within normal limits   RESPIRATORY CULTURE   CULTURE BLOOD #2   CULTURE BLOOD #1   TROPONIN   POTASSIUM, WHOLE BLOOD   URINE RT REFLEX TO CULTURE       All other labs were within normal range or not returned as of this dictation. EMERGENCY DEPARTMENT COURSE and DIFFERENTIAL DIAGNOSIS/MDM:   Vitals:    Vitals:    08/07/18 2238 08/07/18 2330 08/08/18 0015 08/08/18 0117   BP: 128/64 110/61 115/74 118/76   Pulse: 98 97 93 90   Resp: 24 26 18 18   Temp: 98.4 °F (36.9 °C)      TempSrc: Oral      SpO2: 95% 98% 98% 95%   Weight: 112 lb (50.8 kg)      Height: 5' 9\" (1.753 m)          MDM  Carboxyhemoglobin slightly elevated but similar prior labs and patient is still smoking which probably explains the elevated carboxy hemoglobin. Patient resting comfortably. He is in no distress. Uncertain as far as the etiology for the findings on CT scan.  Given persistent symptoms feel that admission is warranted with plan for IV

## 2018-08-09 LAB
ANION GAP SERPL CALCULATED.3IONS-SCNC: 13 MMOL/L (ref 7–19)
BASOPHILS ABSOLUTE: 0 K/UL (ref 0–0.2)
BASOPHILS RELATIVE PERCENT: 0.1 % (ref 0–1)
BUN BLDV-MCNC: 9 MG/DL (ref 8–23)
CALCIUM SERPL-MCNC: 9.4 MG/DL (ref 8.8–10.2)
CHLORIDE BLD-SCNC: 95 MMOL/L (ref 98–111)
CO2: 23 MMOL/L (ref 22–29)
CREAT SERPL-MCNC: <0.5 MG/DL (ref 0.5–1.2)
EOSINOPHILS ABSOLUTE: 0 K/UL (ref 0–0.6)
EOSINOPHILS RELATIVE PERCENT: 0 % (ref 0–5)
GFR NON-AFRICAN AMERICAN: >60
GLUCOSE BLD-MCNC: 139 MG/DL (ref 74–109)
HCT VFR BLD CALC: 38 % (ref 42–52)
HEMOGLOBIN: 12 G/DL (ref 14–18)
IGA: 111 MG/DL (ref 70–400)
IGG: 522 MG/DL (ref 700–1600)
IGM: 75 MG/DL (ref 40–230)
LYMPHOCYTES ABSOLUTE: 0.4 K/UL (ref 1.1–4.5)
LYMPHOCYTES RELATIVE PERCENT: 3.3 % (ref 20–40)
MCH RBC QN AUTO: 28.5 PG (ref 27–31)
MCHC RBC AUTO-ENTMCNC: 31.6 G/DL (ref 33–37)
MCV RBC AUTO: 90.3 FL (ref 80–94)
MONOCYTES ABSOLUTE: 0.2 K/UL (ref 0–0.9)
MONOCYTES RELATIVE PERCENT: 1.6 % (ref 0–10)
NEUTROPHILS ABSOLUTE: 10.8 K/UL (ref 1.5–7.5)
NEUTROPHILS RELATIVE PERCENT: 94.7 % (ref 50–65)
PDW BLD-RTO: 13.5 % (ref 11.5–14.5)
PLATELET # BLD: 243 K/UL (ref 130–400)
PMV BLD AUTO: 10.4 FL (ref 9.4–12.4)
POTASSIUM SERPL-SCNC: 4.6 MMOL/L (ref 3.5–5)
RBC # BLD: 4.21 M/UL (ref 4.7–6.1)
RHEUMATOID FACTOR: 18 IU/ML
SODIUM BLD-SCNC: 131 MMOL/L (ref 136–145)
WBC # BLD: 11.4 K/UL (ref 4.8–10.8)

## 2018-08-09 PROCEDURE — 86255 FLUORESCENT ANTIBODY SCREEN: CPT

## 2018-08-09 PROCEDURE — 6370000000 HC RX 637 (ALT 250 FOR IP): Performed by: FAMILY MEDICINE

## 2018-08-09 PROCEDURE — 82784 ASSAY IGA/IGD/IGG/IGM EACH: CPT

## 2018-08-09 PROCEDURE — 36415 COLL VENOUS BLD VENIPUNCTURE: CPT

## 2018-08-09 PROCEDURE — 86431 RHEUMATOID FACTOR QUANT: CPT

## 2018-08-09 PROCEDURE — 85025 COMPLETE CBC W/AUTO DIFF WBC: CPT

## 2018-08-09 PROCEDURE — 2700000000 HC OXYGEN THERAPY PER DAY

## 2018-08-09 PROCEDURE — 80048 BASIC METABOLIC PNL TOTAL CA: CPT

## 2018-08-09 PROCEDURE — 86480 TB TEST CELL IMMUN MEASURE: CPT

## 2018-08-09 PROCEDURE — 6360000002 HC RX W HCPCS: Performed by: FAMILY MEDICINE

## 2018-08-09 PROCEDURE — 94762 N-INVAS EAR/PLS OXIMTRY CONT: CPT

## 2018-08-09 PROCEDURE — 2580000003 HC RX 258: Performed by: FAMILY MEDICINE

## 2018-08-09 PROCEDURE — 1210000000 HC MED SURG R&B

## 2018-08-09 PROCEDURE — 82785 ASSAY OF IGE: CPT

## 2018-08-09 PROCEDURE — 94640 AIRWAY INHALATION TREATMENT: CPT

## 2018-08-09 PROCEDURE — 86308 HETEROPHILE ANTIBODY SCREEN: CPT

## 2018-08-09 RX ADMIN — LISINOPRIL 10 MG: 10 TABLET ORAL at 10:00

## 2018-08-09 RX ADMIN — IPRATROPIUM BROMIDE AND ALBUTEROL SULFATE 1 AMPULE: .5; 3 SOLUTION RESPIRATORY (INHALATION) at 10:12

## 2018-08-09 RX ADMIN — METHYLPREDNISOLONE SODIUM SUCCINATE 80 MG: 125 INJECTION, POWDER, FOR SOLUTION INTRAMUSCULAR; INTRAVENOUS at 10:05

## 2018-08-09 RX ADMIN — Medication 2 G: at 02:26

## 2018-08-09 RX ADMIN — GUAIFENESIN 1200 MG: 600 TABLET, EXTENDED RELEASE ORAL at 21:16

## 2018-08-09 RX ADMIN — METHYLPREDNISOLONE SODIUM SUCCINATE 80 MG: 125 INJECTION, POWDER, FOR SOLUTION INTRAMUSCULAR; INTRAVENOUS at 17:04

## 2018-08-09 RX ADMIN — METHYLPREDNISOLONE SODIUM SUCCINATE 80 MG: 125 INJECTION, POWDER, FOR SOLUTION INTRAMUSCULAR; INTRAVENOUS at 02:26

## 2018-08-09 RX ADMIN — IPRATROPIUM BROMIDE AND ALBUTEROL SULFATE 1 AMPULE: .5; 3 SOLUTION RESPIRATORY (INHALATION) at 14:38

## 2018-08-09 RX ADMIN — Medication 2 G: at 17:04

## 2018-08-09 RX ADMIN — Medication 2 G: at 10:05

## 2018-08-09 RX ADMIN — ROFLUMILAST 500 MCG: 500 TABLET ORAL at 10:00

## 2018-08-09 RX ADMIN — LORAZEPAM 0.5 MG: 0.5 TABLET ORAL at 21:16

## 2018-08-09 RX ADMIN — Medication 10 ML: at 10:06

## 2018-08-09 RX ADMIN — IPRATROPIUM BROMIDE AND ALBUTEROL SULFATE 1 AMPULE: .5; 3 SOLUTION RESPIRATORY (INHALATION) at 06:32

## 2018-08-09 RX ADMIN — GUAIFENESIN 1200 MG: 600 TABLET, EXTENDED RELEASE ORAL at 10:00

## 2018-08-09 RX ADMIN — IPRATROPIUM BROMIDE AND ALBUTEROL SULFATE 1 AMPULE: .5; 3 SOLUTION RESPIRATORY (INHALATION) at 18:45

## 2018-08-09 RX ADMIN — CITALOPRAM HYDROBROMIDE 40 MG: 40 TABLET ORAL at 10:00

## 2018-08-09 NOTE — PROGRESS NOTES
Family Medicine Progress Note    Patient:  Veena Grant  YOB: 1960    MRN: [id number]     Acct: [de-identified]     Admit date: 8/7/2018    Patient Seen, Chart, Consults notes, Labs, Radiology studies reviewed. Subjective: Day 1 of stay with acute on chronic respiratory failure and most recent (in last 24 hours) has had minimal improvement. Is a little less short of air today, but feels dyspneic despite his good lab and pulse oximetry. A little less weak and appetite has improved somewhat. Past, Family, Social History unchanged from admission. Diet:  DIET GENERAL;    Medications:  Scheduled Meds:   sodium chloride flush  10 mL Intravenous 2 times per day    enoxaparin  40 mg Subcutaneous Daily    Roflumilast  500 mcg Oral Daily    citalopram  40 mg Oral Daily    guaiFENesin  1,200 mg Oral BID    lisinopril  10 mg Oral Daily    nicotine  1 patch Transdermal Daily    ipratropium-albuterol  1 ampule Inhalation Q4H WA    cefepime  2 g Intravenous Q8H    methylPREDNISolone  80 mg Intravenous Q8H     Continuous Infusions:  PRN Meds:sodium chloride flush, acetaminophen, albuterol, LORazepam    Objective:    Vitals: BP (!) 101/58   Pulse 67   Temp 97.8 °F (36.6 °C)   Resp 18   Ht 5' 9\" (1.753 m)   Wt 112 lb (50.8 kg)   SpO2 96%   BMI 16.54 kg/m²   24 hour intake/output:  Intake/Output Summary (Last 24 hours) at 08/09/18 0849  Last data filed at 08/09/18 0443   Gross per 24 hour   Intake             1300 ml   Output             1220 ml   Net               80 ml     Last 3 weights: Wt Readings from Last 3 Encounters:   08/07/18 112 lb (50.8 kg)   05/29/17 135 lb (61.2 kg)   10/30/16 139 lb 5 oz (63.2 kg)       Physical Exam:    General Appearance:  alert, cooperative and flat affect  Skin:  Skin color, texture, turgor normal. No rashes or lesions.   Eyes:  Sclera nonicteric  Neck:  neck- supple, no mass, non-tender  Lungs:  Breathing Pattern: regular, no distress, Breath sounds: diminished breath sounds- throughout and diffuse  Heart:  Heart regular rate and rhythm  Abdomen: Auscultation: Normal bowel sounds. No bruits. Palpation: No masses, tenderness or organomegally. Extremities: Extremities warm to touch, pink, with no edema.   Musculoskeletal:  negative  Neurologic:  negative    CBC with Differential:  Lab Results   Component Value Date    WBC 11.4 08/09/2018    RBC 4.21 08/09/2018    HGB 12.0 08/09/2018    HCT 38.0 08/09/2018    HCT 46.5 02/09/2012     08/09/2018    PLT 90 02/09/2012    MCV 90.3 08/09/2018    MCH 28.5 08/09/2018    MCHC 31.6 08/09/2018    RDW 13.5 08/09/2018    LYMPHOPCT 3.3 08/09/2018    MONOPCT 1.6 08/09/2018    EOSPCT 0.8 02/09/2012    BASOPCT 0.1 08/09/2018    MONOSABS 0.20 08/09/2018    LYMPHSABS 0.4 08/09/2018    EOSABS 0.00 08/09/2018    BASOSABS 0.00 08/09/2018     CMP:  Lab Results   Component Value Date     08/09/2018     02/09/2012    K 4.6 08/09/2018    K 4.0 02/09/2012    CL 95 08/09/2018     02/09/2012    CO2 23 08/09/2018    BUN 9 08/09/2018    CREATININE <0.5 08/09/2018    CREATININE 0.6 02/09/2012    LABGLOM >60 08/09/2018    GLUCOSE 139 08/09/2018    PROT 7.3 08/07/2018    PROT 5.7 09/03/2012    LABALBU 4.1 08/07/2018    LABALBU 4.4 02/09/2012    CALCIUM 9.4 08/09/2018    BILITOT 0.6 08/07/2018    ALKPHOS 85 08/07/2018    ALKPHOS 85 02/09/2012    AST 19 08/07/2018    ALT 16 08/07/2018     Last 3 Troponin:  Lab Results   Component Value Date    TROPONINI <0.01 08/07/2018    TROPONINI 0.00 05/29/2017    TROPONINI <0.01 10/22/2016    TROPONINI 0.00 12/18/2015    TROPONINI <0.01 12/17/2015     Urine Culture:  No components found for: CURINE  Blood Culture:  No components found for: CBLOOD, CFUNGUSBL  Stool Culture:  No components found for: CSTOOL    Assessment:    Principal Problem:    Pneumonia  Active Problems:    Panlobular emphysema (HCC)    Hyperlipidemia    Hypertension    Nicotine dependence, cigarettes, uncomplicated    Abnormal weight loss  Resolved Problems:    * No resolved hospital problems. *          Plan:  Does have a slightly elevated rheumatoid factor. Would certainly be interesting if this is pertinent. Has multiple other studies pending. I agree completely with the assessment by pulmonology. Appreciate their input. Any invasive tests with be of significant risk. I still don't have a good grasp of his persistent weight loss, unless he is just highly metabolic due to his work of breathing. His GI workup is still on hold until his breathing improves. I anticipate him to be here through the weekend and into the 1st of next week in order to get his breathing back to baseline.       Electronically signed by Irvin Alexandra MD on 8/9/2018 at 8:49 AM

## 2018-08-10 ENCOUNTER — APPOINTMENT (OUTPATIENT)
Dept: GENERAL RADIOLOGY | Age: 58
DRG: 193 | End: 2018-08-10
Payer: MEDICARE

## 2018-08-10 LAB
ANA IGG, ELISA: NORMAL
ANCA IFA: NORMAL
ANION GAP SERPL CALCULATED.3IONS-SCNC: 12 MMOL/L (ref 7–19)
BUN BLDV-MCNC: 16 MG/DL (ref 8–23)
CALCIUM SERPL-MCNC: 9.1 MG/DL (ref 8.8–10.2)
CHLORIDE BLD-SCNC: 93 MMOL/L (ref 98–111)
CO2: 24 MMOL/L (ref 22–29)
CREAT SERPL-MCNC: <0.5 MG/DL (ref 0.5–1.2)
CULTURE, RESPIRATORY: NORMAL
GFR NON-AFRICAN AMERICAN: >60
GLUCOSE BLD-MCNC: 131 MG/DL (ref 74–109)
GRAM STAIN RESULT: NORMAL
HCT VFR BLD CALC: 33.6 % (ref 42–52)
HEMOGLOBIN: 11 G/DL (ref 14–18)
HISTOPLASMA ANTIGEN URINE INTERP: NOT DETECTED
HISTOPLASMA ANTIGEN URINE: NOT DETECTED NG/ML
MCH RBC QN AUTO: 28.9 PG (ref 27–31)
MCHC RBC AUTO-ENTMCNC: 32.7 G/DL (ref 33–37)
MCV RBC AUTO: 88.2 FL (ref 80–94)
PDW BLD-RTO: 13.4 % (ref 11.5–14.5)
PLATELET # BLD: 263 K/UL (ref 130–400)
PMV BLD AUTO: 12.2 FL (ref 9.4–12.4)
POTASSIUM SERPL-SCNC: 4.7 MMOL/L (ref 3.5–5)
RBC # BLD: 3.81 M/UL (ref 4.7–6.1)
SODIUM BLD-SCNC: 129 MMOL/L (ref 136–145)
WBC # BLD: 20.3 K/UL (ref 4.8–10.8)

## 2018-08-10 PROCEDURE — 71046 X-RAY EXAM CHEST 2 VIEWS: CPT

## 2018-08-10 PROCEDURE — 6370000000 HC RX 637 (ALT 250 FOR IP): Performed by: FAMILY MEDICINE

## 2018-08-10 PROCEDURE — 36415 COLL VENOUS BLD VENIPUNCTURE: CPT

## 2018-08-10 PROCEDURE — 94640 AIRWAY INHALATION TREATMENT: CPT

## 2018-08-10 PROCEDURE — 85027 COMPLETE CBC AUTOMATED: CPT

## 2018-08-10 PROCEDURE — 86200 CCP ANTIBODY: CPT

## 2018-08-10 PROCEDURE — 94664 DEMO&/EVAL PT USE INHALER: CPT

## 2018-08-10 PROCEDURE — 94762 N-INVAS EAR/PLS OXIMTRY CONT: CPT

## 2018-08-10 PROCEDURE — 6360000002 HC RX W HCPCS: Performed by: FAMILY MEDICINE

## 2018-08-10 PROCEDURE — 1210000000 HC MED SURG R&B

## 2018-08-10 PROCEDURE — 2700000000 HC OXYGEN THERAPY PER DAY

## 2018-08-10 PROCEDURE — 80048 BASIC METABOLIC PNL TOTAL CA: CPT

## 2018-08-10 PROCEDURE — 2580000003 HC RX 258: Performed by: FAMILY MEDICINE

## 2018-08-10 RX ORDER — LISINOPRIL 5 MG/1
5 TABLET ORAL DAILY
Status: DISCONTINUED | OUTPATIENT
Start: 2018-08-10 | End: 2018-08-14 | Stop reason: HOSPADM

## 2018-08-10 RX ORDER — CEFDINIR 300 MG/1
300 CAPSULE ORAL EVERY 12 HOURS SCHEDULED
Status: DISCONTINUED | OUTPATIENT
Start: 2018-08-10 | End: 2018-08-14 | Stop reason: HOSPADM

## 2018-08-10 RX ORDER — METHYLPREDNISOLONE SODIUM SUCCINATE 40 MG/ML
40 INJECTION, POWDER, LYOPHILIZED, FOR SOLUTION INTRAMUSCULAR; INTRAVENOUS EVERY 8 HOURS
Status: DISCONTINUED | OUTPATIENT
Start: 2018-08-10 | End: 2018-08-13

## 2018-08-10 RX ORDER — VARENICLINE TARTRATE 0.5 MG/1
0.5 TABLET, FILM COATED ORAL 2 TIMES DAILY
Status: DISCONTINUED | OUTPATIENT
Start: 2018-08-10 | End: 2018-08-14 | Stop reason: HOSPADM

## 2018-08-10 RX ORDER — MIRTAZAPINE 15 MG/1
15 TABLET, FILM COATED ORAL NIGHTLY
Status: DISCONTINUED | OUTPATIENT
Start: 2018-08-10 | End: 2018-08-14 | Stop reason: HOSPADM

## 2018-08-10 RX ADMIN — CEFDINIR 300 MG: 300 CAPSULE ORAL at 20:44

## 2018-08-10 RX ADMIN — LORAZEPAM 0.5 MG: 0.5 TABLET ORAL at 20:48

## 2018-08-10 RX ADMIN — Medication 10 ML: at 20:44

## 2018-08-10 RX ADMIN — GUAIFENESIN 1200 MG: 600 TABLET, EXTENDED RELEASE ORAL at 08:40

## 2018-08-10 RX ADMIN — ACETAMINOPHEN 650 MG: 325 TABLET ORAL at 20:44

## 2018-08-10 RX ADMIN — METHYLPREDNISOLONE SODIUM SUCCINATE 40 MG: 40 INJECTION, POWDER, FOR SOLUTION INTRAMUSCULAR; INTRAVENOUS at 08:40

## 2018-08-10 RX ADMIN — MIRTAZAPINE 15 MG: 15 TABLET, FILM COATED ORAL at 20:44

## 2018-08-10 RX ADMIN — IPRATROPIUM BROMIDE AND ALBUTEROL SULFATE 1 AMPULE: .5; 3 SOLUTION RESPIRATORY (INHALATION) at 18:23

## 2018-08-10 RX ADMIN — Medication 10 ML: at 08:41

## 2018-08-10 RX ADMIN — IPRATROPIUM BROMIDE AND ALBUTEROL SULFATE 1 AMPULE: .5; 3 SOLUTION RESPIRATORY (INHALATION) at 10:27

## 2018-08-10 RX ADMIN — VARENICLINE TARTRATE 0.5 MG: 0.5 TABLET, FILM COATED ORAL at 20:44

## 2018-08-10 RX ADMIN — IPRATROPIUM BROMIDE AND ALBUTEROL SULFATE 1 AMPULE: .5; 3 SOLUTION RESPIRATORY (INHALATION) at 06:52

## 2018-08-10 RX ADMIN — GUAIFENESIN 1200 MG: 600 TABLET, EXTENDED RELEASE ORAL at 20:44

## 2018-08-10 RX ADMIN — ROFLUMILAST 500 MCG: 500 TABLET ORAL at 08:40

## 2018-08-10 RX ADMIN — VARENICLINE TARTRATE 0.5 MG: 0.5 TABLET, FILM COATED ORAL at 08:40

## 2018-08-10 RX ADMIN — IPRATROPIUM BROMIDE AND ALBUTEROL SULFATE 1 AMPULE: .5; 3 SOLUTION RESPIRATORY (INHALATION) at 14:08

## 2018-08-10 RX ADMIN — CEFDINIR 300 MG: 300 CAPSULE ORAL at 08:40

## 2018-08-10 RX ADMIN — METHYLPREDNISOLONE SODIUM SUCCINATE 40 MG: 40 INJECTION, POWDER, FOR SOLUTION INTRAMUSCULAR; INTRAVENOUS at 17:27

## 2018-08-10 ASSESSMENT — PAIN SCALES - GENERAL
PAINLEVEL_OUTOF10: 0
PAINLEVEL_OUTOF10: 1

## 2018-08-10 ASSESSMENT — PULMONARY FUNCTION TESTS: PEFR_L/MIN: 13

## 2018-08-10 NOTE — PROGRESS NOTES
13.4      Recent Labs      08/07/18   2239  08/07/18   2305  08/09/18   0411  08/10/18   0335   NA  132*   --   131*  129*   K  4.1  3.8  4.6  4.7   CL  92*   --   95*  93*   CO2  29   --   23  24   BUN  5*   --   9  16   CREATININE  0.5   --   <0.5  <0.5   CALCIUM  9.8   --   9.4  9.1   GLUCOSE  104   --   139*  131*   PHART   --   7.450   --    --    [id number]   --   39.0   --    --    PO2ART   --   92.0   --    --    [id number]   --   27.1*   --    --    [id number]   --   89.5*   --    --    BEART   --   3.0*   --    --    AST  19   --    --    --    ALT  16   --    --    --    ALKPHOS  85   --    --    --    BILITOT  0.6   --    --    --    TROPONINI  <0.01   --    --    --       Recent Labs      08/08/18   0100  08/08/18   0130   BC  No Growth to date. Any change in status will be called. --    LABGRAM   --   Moderate WBC's (Polymorphonuclear) present  Moderate Gram positive cocci  in pairs  Few Gram positive rods present  Few Epithelial Cells present     CULTRESP   --   Normal respiratory lina     Radiograph:   08/10/18 1105    Specimen Type: Chest    Narrative:     EXAMINATION: Chest 2 views 8/10/2018  HISTORY: Respiratory failure/pneumonia  FINDINGS: Today's exam is compared to previous study of 3 days  earlier. There is a persistent irregular nodule within the left upper  lobe as well as some parenchymal consolidation within the right  midlung zone-likely within the superior segment of the lower lobe. This shows slight interval improvement from the previous study. A  small right effusion is present blunting the right lateral and  posterior costophrenic angle. There are moderate to severe  emphysematous changes of the lungs. Impression:     1. . Mild interval improvement in the patient's infiltrate involving  the superior segment of the right lower lobe. There is a persistent  irregular nodule within the left upper lobe. 2. Small right effusion.  Moderate to severe emphysematous changes of  the lungs are present. My radiograph interpretation: Chronic changes, hyperinflation, emyphysema     Pulmonary Assessment:    1. Acute exacerbation of COPD. 2. Lung scarring with suspected bronchiectasis. 3. Immunoglobulin deficiency (IgG)  4. Tobacco abuse    Recommend:   · Anti ccp results pending   · Long term smoking cessation is a must   · Consider IVIG replacement? · Continue nebs, mucinex-add flutter  · O2 walks every shift   · Consider pulmonary rehab if he quits smoking     Electronically signed by Urvashi Slater on 8/10/2018 at 4:17 PM  Physician's substantive portion:  Subjective: He states he is feeling a bit better. He is going to try and increase his activity level. Objective: He has decreased breath sounds on chest exam.  Assessment/recommendation: We will continue his current regimen, some serologic studies are still pending. I have seen and examined patient personally, performing a face-to-face diagnostic evaluation with plan of care reviewed and developed with APRN. I have addended and/or modified the above history of present illness, physical examination, and assessment and plan to reflect my findings and impressions. Essential elements of the care plan were discussed with APRN above. Agree with findings and assessment/plan as documented above.     Electronically signed by Hien Jessica MD on 8/10/18 at 7:09 PM

## 2018-08-10 NOTE — CARE COORDINATION
Pt lives at home alone. Pt has home O2, nebulizer and he uses his O2 only at night and then takes his portables if he goes anywhere. Has recently been on 3L at home. SW introduced the [employer]. COPD program to pt, and at this time he feels that he does not need or would not utilize their services. SW told pt to reach out if he changed his mind and SW would help him sign up. Pt stated that he just wishes he could quit smoking, right now he is on patches and chantix and it seems to help. Pt states that this is affordable to him outside of the hospital he just needs to stick with it. SW will continue to follow and assist as needed.

## 2018-08-11 LAB
ANION GAP SERPL CALCULATED.3IONS-SCNC: 9 MMOL/L (ref 7–19)
BASOPHILS ABSOLUTE: 0 K/UL (ref 0–0.2)
BASOPHILS RELATIVE PERCENT: 0.1 % (ref 0–1)
BUN BLDV-MCNC: 17 MG/DL (ref 8–23)
CALCIUM SERPL-MCNC: 9.3 MG/DL (ref 8.8–10.2)
CCP IGG ANTIBODIES: 2 UNITS (ref 0–19)
CHLORIDE BLD-SCNC: 98 MMOL/L (ref 98–111)
CO2: 26 MMOL/L (ref 22–29)
CREAT SERPL-MCNC: <0.5 MG/DL (ref 0.5–1.2)
EOSINOPHILS ABSOLUTE: 0 K/UL (ref 0–0.6)
EOSINOPHILS RELATIVE PERCENT: 0 % (ref 0–5)
GFR NON-AFRICAN AMERICAN: >60
GLUCOSE BLD-MCNC: 129 MG/DL (ref 74–109)
HCT VFR BLD CALC: 36.3 % (ref 42–52)
HEMOGLOBIN: 11.8 G/DL (ref 14–18)
IGE: 20 KU/L
IGG 1: 319 MG/DL (ref 240–1118)
IGG 2: 51 MG/DL (ref 124–549)
IGG 3: 25 MG/DL (ref 21–134)
IGG 4: <1 MG/DL (ref 1–123)
LYMPHOCYTES ABSOLUTE: 0.4 K/UL (ref 1.1–4.5)
LYMPHOCYTES RELATIVE PERCENT: 3.1 % (ref 20–40)
MCH RBC QN AUTO: 28.7 PG (ref 27–31)
MCHC RBC AUTO-ENTMCNC: 32.5 G/DL (ref 33–37)
MCV RBC AUTO: 88.3 FL (ref 80–94)
MONOCYTES ABSOLUTE: 0.4 K/UL (ref 0–0.9)
MONOCYTES RELATIVE PERCENT: 3.2 % (ref 0–10)
NEUTROPHILS ABSOLUTE: 10.4 K/UL (ref 1.5–7.5)
NEUTROPHILS RELATIVE PERCENT: 93.2 % (ref 50–65)
PDW BLD-RTO: 13.6 % (ref 11.5–14.5)
PLATELET # BLD: 280 K/UL (ref 130–400)
PMV BLD AUTO: 11.4 FL (ref 9.4–12.4)
POTASSIUM SERPL-SCNC: 5 MMOL/L (ref 3.5–5)
RBC # BLD: 4.11 M/UL (ref 4.7–6.1)
SODIUM BLD-SCNC: 133 MMOL/L (ref 136–145)
WBC # BLD: 11.1 K/UL (ref 4.8–10.8)

## 2018-08-11 PROCEDURE — 80048 BASIC METABOLIC PNL TOTAL CA: CPT

## 2018-08-11 PROCEDURE — 6370000000 HC RX 637 (ALT 250 FOR IP): Performed by: FAMILY MEDICINE

## 2018-08-11 PROCEDURE — 85025 COMPLETE CBC W/AUTO DIFF WBC: CPT

## 2018-08-11 PROCEDURE — 94762 N-INVAS EAR/PLS OXIMTRY CONT: CPT

## 2018-08-11 PROCEDURE — 36415 COLL VENOUS BLD VENIPUNCTURE: CPT

## 2018-08-11 PROCEDURE — 2580000003 HC RX 258: Performed by: FAMILY MEDICINE

## 2018-08-11 PROCEDURE — 94640 AIRWAY INHALATION TREATMENT: CPT

## 2018-08-11 PROCEDURE — 6360000002 HC RX W HCPCS: Performed by: FAMILY MEDICINE

## 2018-08-11 PROCEDURE — 1210000000 HC MED SURG R&B

## 2018-08-11 PROCEDURE — 2700000000 HC OXYGEN THERAPY PER DAY

## 2018-08-11 RX ADMIN — METHYLPREDNISOLONE SODIUM SUCCINATE 40 MG: 40 INJECTION, POWDER, FOR SOLUTION INTRAMUSCULAR; INTRAVENOUS at 17:56

## 2018-08-11 RX ADMIN — METHYLPREDNISOLONE SODIUM SUCCINATE 40 MG: 40 INJECTION, POWDER, FOR SOLUTION INTRAMUSCULAR; INTRAVENOUS at 10:09

## 2018-08-11 RX ADMIN — METHYLPREDNISOLONE SODIUM SUCCINATE 40 MG: 40 INJECTION, POWDER, FOR SOLUTION INTRAMUSCULAR; INTRAVENOUS at 01:07

## 2018-08-11 RX ADMIN — IPRATROPIUM BROMIDE AND ALBUTEROL SULFATE 1 AMPULE: .5; 3 SOLUTION RESPIRATORY (INHALATION) at 07:02

## 2018-08-11 RX ADMIN — GUAIFENESIN 1200 MG: 600 TABLET, EXTENDED RELEASE ORAL at 10:08

## 2018-08-11 RX ADMIN — IPRATROPIUM BROMIDE AND ALBUTEROL SULFATE 1 AMPULE: .5; 3 SOLUTION RESPIRATORY (INHALATION) at 14:51

## 2018-08-11 RX ADMIN — Medication 10 ML: at 10:06

## 2018-08-11 RX ADMIN — Medication 10 ML: at 19:42

## 2018-08-11 RX ADMIN — ROFLUMILAST 500 MCG: 500 TABLET ORAL at 10:08

## 2018-08-11 RX ADMIN — IPRATROPIUM BROMIDE AND ALBUTEROL SULFATE 1 AMPULE: .5; 3 SOLUTION RESPIRATORY (INHALATION) at 18:56

## 2018-08-11 RX ADMIN — LORAZEPAM 0.5 MG: 0.5 TABLET ORAL at 22:21

## 2018-08-11 RX ADMIN — MIRTAZAPINE 15 MG: 15 TABLET, FILM COATED ORAL at 19:41

## 2018-08-11 RX ADMIN — VARENICLINE TARTRATE 0.5 MG: 0.5 TABLET, FILM COATED ORAL at 19:41

## 2018-08-11 RX ADMIN — Medication 10 ML: at 17:57

## 2018-08-11 RX ADMIN — IPRATROPIUM BROMIDE AND ALBUTEROL SULFATE 1 AMPULE: .5; 3 SOLUTION RESPIRATORY (INHALATION) at 10:57

## 2018-08-11 RX ADMIN — CEFDINIR 300 MG: 300 CAPSULE ORAL at 10:08

## 2018-08-11 RX ADMIN — VARENICLINE TARTRATE 0.5 MG: 0.5 TABLET, FILM COATED ORAL at 10:08

## 2018-08-11 RX ADMIN — GUAIFENESIN 1200 MG: 600 TABLET, EXTENDED RELEASE ORAL at 19:41

## 2018-08-11 RX ADMIN — CEFDINIR 300 MG: 300 CAPSULE ORAL at 19:41

## 2018-08-11 RX ADMIN — ENOXAPARIN SODIUM 40 MG: 40 INJECTION SUBCUTANEOUS at 10:06

## 2018-08-11 RX ADMIN — LISINOPRIL 5 MG: 5 TABLET ORAL at 10:08

## 2018-08-11 NOTE — PROGRESS NOTES
Pulmonary and Critical Care Progress Note [facility]    Patient: Cheyanne Chairez  1960   MR# [id number]   Acct# [de-identified]  08/11/18   1:39 PM  Referring Provider: Cheryl Cisneros MD  Problem list:   Patient Active Problem List   Diagnosis    Family history of colon cancer    Family history of colonic polyps    Benign colon polyp    S/P colonoscopic polypectomy    Diverticulosis of colon    Loose stools    Abdominal cramping    History of colon polyps    Abnormal CT of the abdomen    Panlobular emphysema ([address]    Hyperlipidemia    Hypertension    Nicotine dependence, cigarettes, uncomplicated    Acute exacerbation of chronic obstructive airways disease ([address]    [facility] obstructive [facility] disease) [facility] [address]    Pneumonia of right lower lobe due to Pseudomonas species ([facility] [address]    Pneumonia    Abnormal weight loss     Chief Complaint: Dyspnea. Interval history: The patient states he is clearing secretions much better. He still gets dyspneic with fairly minimal exertion but is going to try to continue to take walks with oxygen. mirtazapine 15 mg Oral Nightly   varenicline 0.5 mg Oral BID   methylPREDNISolone 40 mg Intravenous Q8H   lisinopril 5 mg Oral Daily   cefdinir 300 mg Oral 2 times per day   sodium chloride flush 10 mL Intravenous 2 times per day   enoxaparin 40 mg Subcutaneous Daily   Roflumilast 500 mcg Oral Daily   guaiFENesin 1,200 mg Oral BID   nicotine 1 patch Transdermal Daily   ipratropium-albuterol 1 ampule Inhalation Q4H WA       Review of Systems:  Constitutional: He denies fever or chills. He has had weight loss. HEENT: Negative. Eyes: Negative. Respiratory: He has had dyspnea and some cough but very minimal productive cough. Cardiac: Negative. GI: He has had weight loss. Extremities: Negative. Musculoskeletal: Some generalized weakness. Psychiatric: He has been somewhat depressed recently as his mother had just passed away.   ROS    Physical Exam:  Blood pressure 114/70, pulse 61, temperature 96 °F (35.6 °C), temperature source Temporal, resp. rate 16, height 5' 9\" (1.753 m), weight 112 lb (50.8 kg), SpO2 98 %. Intake/Output Summary (Last 24 hours) at 08/11/18 1339  Last data filed at 08/11/18 1017   Gross per 24 hour   Intake              640 ml   Output              450 ml   Net              190 ml     Physical Exam Gen.: He is a chronically ill-appearing white male. HEENT: Nasal oxygen is in place. Eyes: Pupils are equal round reactive to light. Neck: Supple without JVD. Chest: Diminished breath sounds throughout some scattered expiratory rhonchi. Some bronchospastic cough. Cardiac: No murmur or gallop noted. Abdomen: Soft. Extremities: No cyanosis clubbing or edema noted. Musculoskeletal: No joint deformity is noted. He does have muscle wasting particularly of the interosseous muscles of the hands. Dermatologic: No rash noted. Neurologic: No focal deficits noted. Psychiatric: He appears minimally depressed but otherwise has appropriate mood and affect. Lymphatic: No adenopathy palpated. Recent Labs      08/09/18   0411  08/10/18   0335  08/11/18   0309   WBC  11.4*  20.3*  11.1*   RBC  4.21*  3.81*  4.11*   HGB  12.0*  11.0*  11.8*   HCT  38.0*  33.6*  36.3*   PLT  243  263  280   MCV  90.3  88.2  88.3   MCH  28.5  28.9  28.7   MCHC  31.6*  32.7*  32.5*   RDW  13.5  13.4  13.6      Recent Labs      08/09/18   0411  08/10/18   0335  08/11/18   0309   NA  131*  129*  133*   K  4.6  4.7  5.0   CL  95*  93*  98   CO2  23  24  26   BUN  9  16  17   CREATININE  <0.5  <0.5  <0.5   CALCIUM  9.4  9.1  9.3   GLUCOSE  139*  131*  129*      No results for input(s): BC, LABGRAM, CULTRESP, BFCX in the last 72 hours. Radiograph:   08/10/18 1105    Specimen Type: Chest    Narrative:     EXAMINATION: Chest 2 views 8/10/2018  HISTORY: Respiratory failure/pneumonia  FINDINGS: Today's exam is compared to previous study of 3 days  earlier.  There is a persistent irregular nodule within the left upper  lobe as well as some parenchymal consolidation within the right  midlung zone-likely within the superior segment of the lower lobe. This shows slight interval improvement from the previous study. A  small right effusion is present blunting the right lateral and  posterior costophrenic angle. There are moderate to severe  emphysematous changes of the lungs. Impression:     1. . Mild interval improvement in the patient's infiltrate involving  the superior segment of the right lower lobe. There is a persistent  irregular nodule within the left upper lobe. 2. Small right effusion. Moderate to severe emphysematous changes of  the lungs are present. My radiograph interpretation: There may be slight improvement in his right lower lobe infiltrate but significant chronic changes persist.    Pulmonary Assessment:    1. Acute exacerbation of COPD. 2. Lung scarring with suspected bronchiectasis. 3. Immunoglobulin deficiency (IgG)  4. Tobacco abuse    Recommend:   · Anti ccp results, ANCA results, and hemoglobin subclasses results are pending. · Long term smoking cessation is a must   · Continue nebs and pulmonary toilet measures.   · Continue O2 walks every shift   · Consider pulmonary rehab if he quits smoking     Electronically signed by Maricel Sports on 8/11/2018 at 1:39 PM

## 2018-08-11 NOTE — PROGRESS NOTES
* No resolved hospital problems. *          Plan:  PNEUMONIA:  Continue  antibiotics  Pulmonary toilet (nebulized treatments, Incentive Spirometry, P&D)  Early Mobilization  Supplement 02 as needed to maintain oxygen saturation >92%  DVT prophy. .. Medically stable  Continued care through the weekend and possible discharge 1st of next week, unless his clinical course dramatically improves at a faster pace over the weekend. Electronically signed by Mercy Harding PA-C on 8/11/2018 at 9:26 AM      I have discussed the care of Harriett Moraes, including pertinent history and exam findings with the ARNP/PA. I have seen and examined the patient and the key elements of all parts of the encounter have been performed by me. I agree with the assessment and plan as outlined by the ARNP/PA. Please refer to my separate note for complete documentation.      Electronically signed by Stan Castaneda MD on 8/11/2018 at 12:14 PM

## 2018-08-11 NOTE — PROGRESS NOTES
Pt is in bed. Stated that he was a little cold, pt has on bath robe, asked if he would like a couple warm blankets, said no not right now. Will continue to monitor.  Electronically signed by Barbi Gonzalez on 8/11/2018 at 7:54 AM

## 2018-08-12 PROCEDURE — 2580000003 HC RX 258: Performed by: FAMILY MEDICINE

## 2018-08-12 PROCEDURE — 6360000002 HC RX W HCPCS: Performed by: FAMILY MEDICINE

## 2018-08-12 PROCEDURE — 6370000000 HC RX 637 (ALT 250 FOR IP): Performed by: FAMILY MEDICINE

## 2018-08-12 PROCEDURE — 94640 AIRWAY INHALATION TREATMENT: CPT

## 2018-08-12 PROCEDURE — 1210000000 HC MED SURG R&B

## 2018-08-12 PROCEDURE — 2700000000 HC OXYGEN THERAPY PER DAY

## 2018-08-12 PROCEDURE — 94762 N-INVAS EAR/PLS OXIMTRY CONT: CPT

## 2018-08-12 RX ADMIN — Medication 10 ML: at 21:04

## 2018-08-12 RX ADMIN — METHYLPREDNISOLONE SODIUM SUCCINATE 40 MG: 40 INJECTION, POWDER, FOR SOLUTION INTRAMUSCULAR; INTRAVENOUS at 01:11

## 2018-08-12 RX ADMIN — LORAZEPAM 0.5 MG: 0.5 TABLET ORAL at 21:02

## 2018-08-12 RX ADMIN — GUAIFENESIN 1200 MG: 600 TABLET, EXTENDED RELEASE ORAL at 09:51

## 2018-08-12 RX ADMIN — METHYLPREDNISOLONE SODIUM SUCCINATE 40 MG: 40 INJECTION, POWDER, FOR SOLUTION INTRAMUSCULAR; INTRAVENOUS at 09:51

## 2018-08-12 RX ADMIN — Medication 10 ML: at 09:52

## 2018-08-12 RX ADMIN — ROFLUMILAST 500 MCG: 500 TABLET ORAL at 09:51

## 2018-08-12 RX ADMIN — IPRATROPIUM BROMIDE AND ALBUTEROL SULFATE 1 AMPULE: .5; 3 SOLUTION RESPIRATORY (INHALATION) at 19:01

## 2018-08-12 RX ADMIN — LISINOPRIL 5 MG: 5 TABLET ORAL at 09:51

## 2018-08-12 RX ADMIN — VARENICLINE TARTRATE 0.5 MG: 0.5 TABLET, FILM COATED ORAL at 09:51

## 2018-08-12 RX ADMIN — ENOXAPARIN SODIUM 40 MG: 40 INJECTION SUBCUTANEOUS at 09:51

## 2018-08-12 RX ADMIN — VARENICLINE TARTRATE 0.5 MG: 0.5 TABLET, FILM COATED ORAL at 21:02

## 2018-08-12 RX ADMIN — CEFDINIR 300 MG: 300 CAPSULE ORAL at 09:51

## 2018-08-12 RX ADMIN — IPRATROPIUM BROMIDE AND ALBUTEROL SULFATE 1 AMPULE: .5; 3 SOLUTION RESPIRATORY (INHALATION) at 10:28

## 2018-08-12 RX ADMIN — IPRATROPIUM BROMIDE AND ALBUTEROL SULFATE 1 AMPULE: .5; 3 SOLUTION RESPIRATORY (INHALATION) at 06:29

## 2018-08-12 RX ADMIN — CEFDINIR 300 MG: 300 CAPSULE ORAL at 21:02

## 2018-08-12 RX ADMIN — IPRATROPIUM BROMIDE AND ALBUTEROL SULFATE 1 AMPULE: .5; 3 SOLUTION RESPIRATORY (INHALATION) at 14:44

## 2018-08-12 RX ADMIN — GUAIFENESIN 1200 MG: 600 TABLET, EXTENDED RELEASE ORAL at 21:02

## 2018-08-12 RX ADMIN — METHYLPREDNISOLONE SODIUM SUCCINATE 40 MG: 40 INJECTION, POWDER, FOR SOLUTION INTRAMUSCULAR; INTRAVENOUS at 17:22

## 2018-08-12 RX ADMIN — MIRTAZAPINE 15 MG: 15 TABLET, FILM COATED ORAL at 21:02

## 2018-08-12 RX ADMIN — Medication 10 ML: at 17:23

## 2018-08-12 NOTE — PROGRESS NOTES
Pt is sitting in the chair. He asked for a shower this morning and I told he would get one this morning. Pt waiting for breakfast. Will continue to monitor.  Electronically signed by Marisol Nguyen on 8/12/2018 at 7:50 AM

## 2018-08-12 NOTE — PROGRESS NOTES
weakness. Psychiatric: He has been somewhat depressed recently as his mother had just passed away. ROS    Physical Exam:  Blood pressure 110/70, pulse 74, temperature 97.5 °F (36.4 °C), temperature source Temporal, resp. rate 16, height 5' 9\" (1.753 m), weight 112 lb (50.8 kg), SpO2 100 %. Intake/Output Summary (Last 24 hours) at 08/12/18 1157  Last data filed at 08/12/18 0915   Gross per 24 hour   Intake             1220 ml   Output                0 ml   Net             1220 ml     Physical Exam Gen.: He is a chronically ill-appearing white male. HEENT: Nasal oxygen is in place. Eyes: Pupils are equal round reactive to light. Neck: Supple without JVD. Chest: Diminished breath sounds throughout some scattered expiratory rhonchi. Some bronchospastic cough. Cardiac: No murmur or gallop noted. Abdomen: Soft. Extremities: No cyanosis clubbing or edema noted. Musculoskeletal: No joint deformity is noted. He does have muscle wasting particularly of the interosseous muscles of the hands. Dermatologic: No rash noted. Neurologic: No focal deficits noted. Psychiatric: He appears minimally depressed but otherwise has appropriate mood and affect. Lymphatic: No adenopathy palpated. Recent Labs      08/10/18   0335  08/11/18   0309   WBC  20.3*  11.1*   RBC  3.81*  4.11*   HGB  11.0*  11.8*   HCT  33.6*  36.3*   PLT  263  280   MCV  88.2  88.3   MCH  28.9  28.7   MCHC  32.7*  32.5*   RDW  13.4  13.6      Recent Labs      08/10/18   0335  08/11/18   0309   NA  129*  133*   K  4.7  5.0   CL  93*  98   CO2  24  26   BUN  16  17   CREATININE  <0.5  <0.5   CALCIUM  9.1  9.3   GLUCOSE  131*  129*      No results for input(s): BC, LABGRAM, CULTRESP, BFCX in the last 72 hours. Radiograph:   None today                                                                                                My radiograph interpretation:  No new chest x-ray. Pulmonary Assessment:    1. Acute exacerbation of COPD.   2. Lung

## 2018-08-12 NOTE — PROGRESS NOTES
Problems:    * No resolved hospital problems. *          Plan:  PNEUMONIA:  Continue  antibiotics  Pulmonary toilet (nebulized treatments, Incentive Spirometry, P&D)  Early Mobilization  Supplement 02 as needed to maintain oxygen saturation >92%  DVT prophy. .. Medically stable  Continued care through the weekend and possible discharge 1st of next week, unless his clinical course dramatically improves at a faster pace over the weekend. Electronically signed by Babar Castillo PA-C on 8/12/2018 at 8:00 AM    I have discussed the care of Makayla Ford, including pertinent history and exam findings with the ARNP/PA. I have seen and examined the patient and the key elements of all parts of the encounter have been performed by me. I agree with the assessment and plan as outlined by the ARNP/PA. Please refer to my separate note for complete documentation.      Electronically signed by Brendon Sims MD on 8/12/2018 at 11:43 AM

## 2018-08-13 PROBLEM — E43 SEVERE MALNUTRITION (HCC): Chronic | Status: ACTIVE | Noted: 2018-08-13

## 2018-08-13 LAB
BLOOD CULTURE, ROUTINE: NORMAL
CULTURE, BLOOD 2: NORMAL

## 2018-08-13 PROCEDURE — 2580000003 HC RX 258: Performed by: FAMILY MEDICINE

## 2018-08-13 PROCEDURE — 94640 AIRWAY INHALATION TREATMENT: CPT

## 2018-08-13 PROCEDURE — 1210000000 HC MED SURG R&B

## 2018-08-13 PROCEDURE — 6370000000 HC RX 637 (ALT 250 FOR IP): Performed by: FAMILY MEDICINE

## 2018-08-13 PROCEDURE — 6360000002 HC RX W HCPCS: Performed by: FAMILY MEDICINE

## 2018-08-13 PROCEDURE — 94762 N-INVAS EAR/PLS OXIMTRY CONT: CPT

## 2018-08-13 PROCEDURE — 2700000000 HC OXYGEN THERAPY PER DAY

## 2018-08-13 RX ORDER — PREDNISONE 20 MG/1
20 TABLET ORAL DAILY
Status: DISCONTINUED | OUTPATIENT
Start: 2018-08-13 | End: 2018-08-14 | Stop reason: HOSPADM

## 2018-08-13 RX ADMIN — LORAZEPAM 0.5 MG: 0.5 TABLET ORAL at 21:41

## 2018-08-13 RX ADMIN — CEFDINIR 300 MG: 300 CAPSULE ORAL at 21:40

## 2018-08-13 RX ADMIN — IPRATROPIUM BROMIDE AND ALBUTEROL SULFATE 1 AMPULE: .5; 3 SOLUTION RESPIRATORY (INHALATION) at 14:45

## 2018-08-13 RX ADMIN — GUAIFENESIN 1200 MG: 600 TABLET, EXTENDED RELEASE ORAL at 11:17

## 2018-08-13 RX ADMIN — IPRATROPIUM BROMIDE AND ALBUTEROL SULFATE 1 AMPULE: .5; 3 SOLUTION RESPIRATORY (INHALATION) at 06:23

## 2018-08-13 RX ADMIN — MIRTAZAPINE 15 MG: 15 TABLET, FILM COATED ORAL at 21:41

## 2018-08-13 RX ADMIN — LISINOPRIL 5 MG: 5 TABLET ORAL at 11:17

## 2018-08-13 RX ADMIN — GUAIFENESIN 1200 MG: 600 TABLET, EXTENDED RELEASE ORAL at 21:41

## 2018-08-13 RX ADMIN — IPRATROPIUM BROMIDE AND ALBUTEROL SULFATE 1 AMPULE: .5; 3 SOLUTION RESPIRATORY (INHALATION) at 18:59

## 2018-08-13 RX ADMIN — PREDNISONE 20 MG: 20 TABLET ORAL at 11:28

## 2018-08-13 RX ADMIN — CEFDINIR 300 MG: 300 CAPSULE ORAL at 11:28

## 2018-08-13 RX ADMIN — Medication 10 ML: at 11:18

## 2018-08-13 RX ADMIN — VARENICLINE TARTRATE 0.5 MG: 0.5 TABLET, FILM COATED ORAL at 11:15

## 2018-08-13 RX ADMIN — Medication 10 ML: at 21:41

## 2018-08-13 RX ADMIN — IPRATROPIUM BROMIDE AND ALBUTEROL SULFATE 1 AMPULE: .5; 3 SOLUTION RESPIRATORY (INHALATION) at 10:15

## 2018-08-13 RX ADMIN — ROFLUMILAST 500 MCG: 500 TABLET ORAL at 11:18

## 2018-08-13 RX ADMIN — VARENICLINE TARTRATE 0.5 MG: 0.5 TABLET, FILM COATED ORAL at 21:40

## 2018-08-13 RX ADMIN — METHYLPREDNISOLONE SODIUM SUCCINATE 40 MG: 40 INJECTION, POWDER, FOR SOLUTION INTRAMUSCULAR; INTRAVENOUS at 00:18

## 2018-08-13 NOTE — PLAN OF CARE
Problem: Nutrition  Goal: Optimal nutrition therapy  Nutrition Problem: Severe malnutrition, in context of chronic illness  Intervention: Food and/or Nutrient Delivery: Continue current diet, Start ONS  Nutritional Goals: PO 50% or more, wt stable or gain    Outcome: Ongoing

## 2018-08-13 NOTE — PROGRESS NOTES
Temporal, resp. rate 20, height 5' 9\" (1.753 m), weight 112 lb (50.8 kg), SpO2 99 %. Intake/Output Summary (Last 24 hours) at 08/13/18 1022  Last data filed at 08/13/18 0323   Gross per 24 hour   Intake              860 ml   Output                0 ml   Net              860 ml     Physical Exam Gen.: He is a chronically ill-appearing white male. HEENT: Nasal oxygen is in place. Eyes: Pupils are equal round reactive to light. Neck: Supple without JVD. Chest: Diminished breath sounds throughout some scattered expiratory rhonchi. Some bronchospastic cough. Cardiac: No murmur or gallop noted. Abdomen: Soft. Extremities: No cyanosis clubbing or edema noted. Musculoskeletal: No joint deformity is noted. He does have muscle wasting particularly of the interosseous muscles of the hands. Dermatologic: No rash noted. Neurologic: No focal deficits noted. Psychiatric: He appears minimally depressed but otherwise has appropriate mood and affect. Lymphatic: No adenopathy palpated. Recent Labs      08/11/18   0309   WBC  11.1*   RBC  4.11*   HGB  11.8*   HCT  36.3*   PLT  280   MCV  88.3   MCH  28.7   MCHC  32.5*   RDW  13.6      Recent Labs      08/11/18   0309   NA  133*   K  5.0   CL  98   CO2  26   BUN  17   CREATININE  <0.5   CALCIUM  9.3   GLUCOSE  129*      No results for input(s): BC, LABGRAM, CULTRESP, BFCX in the last 72 hours. Radiograph:   None today                                                                                                My radiograph interpretation:  No new chest x-ray. Pulmonary Assessment:    1. Acute exacerbation of COPD. 2. Lung scarring with suspected bronchiectasis. 3. Immunoglobulin deficiency (IgG). He does appear to be deficient in subclasses IgG 2 and IgG 4.  4. Tobacco abuse    Recommend:   · The patient is alright for discharge from my standpoint.  He'll be seen in the office in follow-up in a few weeks at which time we can arrange for pulmonary rehab referral assuming he has been able to stay off cigarettes and we can also arrange for further immunology workup.     Electronically signed by Priyanka Kay on 8/13/2018 at 10:22 AM

## 2018-08-13 NOTE — CARE COORDINATION
NAYANA contacted the Pulmonary Rehab. SW asked if pt's come to this rehab as outpatient. Was told yes, and was asked if pt was seen by Respiratory Therapist. General Ambreen stated Dr. Christian Saeed has been seeing this pt. SW stated that generally will require follow up with PCP and with consulted physician such as respiratory once dc'd from hospital. SW asked what needed to be done for the referral process. Pulm rehab stated that Dr. Christian Saeed generally sends the referral outpatient once pt sees in his office. NAYANA stated if any further info needed then available for assistance. SW will follow.

## 2018-08-13 NOTE — PROGRESS NOTES
Nutrition Assessment    Type and Reason for Visit: Initial    Nutrition Recommendations: start ONS    Malnutrition Assessment:  · Malnutrition Status: Meets the criteria for severe malnutrition  · Context: Chronic illness  · Findings of the 6 clinical characteristics of malnutrition (Minimum of 2 out of 6 clinical characteristics is required to make the diagnosis of moderate or severe Protein Calorie Malnutrition based on AND/ASPEN Guidelines):  1. Energy Intake-Less than or equal to 75%, greater than or equal to 3 months    2. Weight Loss-20% loss or greater, unable to assess  3. Fat Loss-Moderate subcutaneous fat loss, Triceps  4. Muscle Loss-Moderate muscle mass loss, Clavicles (pectoralis and deltoids), Interosseous, Temples (temporalis muscle)  5. Fluid Accumulation-No significant fluid accumulation,    6.  Strength-Not measured    Nutrition Diagnosis:   · Problem: Severe malnutrition, in context of chronic illness  · Etiology: related to Insufficient energy/nutrient consumption     Signs and symptoms:  as evidenced by Diet history of poor intake, Moderate loss of subcutaneous fat, Moderate muscle loss    Nutrition Assessment:  · Subjective Assessment: Wt loss over several months, pt unable to report when it began, states appetite has been \"terrible\" at home, r/t to his illness and mother's passing. Appetite is good now, eating \"too much,\" hungry on steroid tx.    · Nutrition-Focused Physical Findings:    · Wound Type:    · Current Nutrition Therapies:  · Oral Diet Orders: General   · Oral Diet intake: 51-75%  · Oral Nutrition Supplement (ONS) Orders: None  · Anthropometric Measures:  · Ht: 5' 9\" (175.3 cm)   · Current Body Wt:    · Admission Body Wt: 112 lb (50.8 kg)  · Usual Body Wt: 140 lb (63.5 kg)  · % Weight Change: 20%,  unable to assess  · Ideal Body Wt: 160 lb (72.6 kg), % Ideal Body    · BMI Classification: BMI <18.5 Underweight    Estimated Intake vs Estimated Needs: Intake Meets Needs    Nutrition Risk Level: High    Nutrition Interventions:   Continue current diet, Start ONS  Continued Inpatient Monitoring    Nutrition Evaluation:   · Evaluation: Goals set   · Goals: PO 50% or more, wt stable or gain    · Monitoring: Meal Intake, Supplement Intake, Weight, Pertinent Labs    See Adult Nutrition Doc Flowsheet for more detail.      Electronically signed by Ryne Douglas MS, RD, LD on 8/13/18 at 3:18 PM    Contact Number: [phone]

## 2018-08-14 VITALS
HEART RATE: 84 BPM | WEIGHT: 112 LBS | BODY MASS INDEX: 16.59 KG/M2 | OXYGEN SATURATION: 93 % | TEMPERATURE: 97.9 F | HEIGHT: 69 IN | RESPIRATION RATE: 20 BRPM | DIASTOLIC BLOOD PRESSURE: 67 MMHG | SYSTOLIC BLOOD PRESSURE: 107 MMHG

## 2018-08-14 PROCEDURE — 2580000003 HC RX 258: Performed by: FAMILY MEDICINE

## 2018-08-14 PROCEDURE — 94640 AIRWAY INHALATION TREATMENT: CPT

## 2018-08-14 PROCEDURE — 6370000000 HC RX 637 (ALT 250 FOR IP): Performed by: FAMILY MEDICINE

## 2018-08-14 RX ORDER — LISINOPRIL 5 MG/1
5 TABLET ORAL DAILY
Qty: 30 TABLET | Refills: 3 | Status: ON HOLD | OUTPATIENT
Start: 2018-08-14 | End: 2018-10-02 | Stop reason: HOSPADM

## 2018-08-14 RX ORDER — CEFDINIR 300 MG/1
300 CAPSULE ORAL EVERY 12 HOURS SCHEDULED
Qty: 20 CAPSULE | Refills: 0 | Status: SHIPPED | OUTPATIENT
Start: 2018-08-14 | End: 2018-08-24

## 2018-08-14 RX ORDER — PREDNISONE 20 MG/1
20 TABLET ORAL DAILY
Qty: 10 TABLET | Refills: 0 | Status: SHIPPED | OUTPATIENT
Start: 2018-08-14 | End: 2018-08-24

## 2018-08-14 RX ORDER — MIRTAZAPINE 15 MG/1
15 TABLET, FILM COATED ORAL NIGHTLY
Qty: 30 TABLET | Refills: 3 | Status: ON HOLD | OUTPATIENT
Start: 2018-08-14 | End: 2018-10-02 | Stop reason: HOSPADM

## 2018-08-14 RX ORDER — VARENICLINE TARTRATE 25 MG
KIT ORAL
Qty: 1 EACH | Refills: 0 | Status: ON HOLD | OUTPATIENT
Start: 2018-08-14 | End: 2018-10-02 | Stop reason: HOSPADM

## 2018-08-14 RX ADMIN — GUAIFENESIN 1200 MG: 600 TABLET, EXTENDED RELEASE ORAL at 10:33

## 2018-08-14 RX ADMIN — LISINOPRIL 5 MG: 5 TABLET ORAL at 10:32

## 2018-08-14 RX ADMIN — VARENICLINE TARTRATE 0.5 MG: 0.5 TABLET, FILM COATED ORAL at 10:38

## 2018-08-14 RX ADMIN — PREDNISONE 20 MG: 20 TABLET ORAL at 10:33

## 2018-08-14 RX ADMIN — IPRATROPIUM BROMIDE AND ALBUTEROL SULFATE 1 AMPULE: .5; 3 SOLUTION RESPIRATORY (INHALATION) at 11:12

## 2018-08-14 RX ADMIN — IPRATROPIUM BROMIDE AND ALBUTEROL SULFATE 1 AMPULE: .5; 3 SOLUTION RESPIRATORY (INHALATION) at 07:20

## 2018-08-14 RX ADMIN — ROFLUMILAST 500 MCG: 500 TABLET ORAL at 10:33

## 2018-08-14 RX ADMIN — CEFDINIR 300 MG: 300 CAPSULE ORAL at 10:38

## 2018-08-14 RX ADMIN — Medication 10 ML: at 10:39

## 2018-08-15 LAB
EKG P AXIS: 81 DEGREES
EKG P-R INTERVAL: 140 MS
EKG Q-T INTERVAL: 376 MS
EKG QRS DURATION: 80 MS
EKG QTC CALCULATION (BAZETT): 426 MS
EKG T AXIS: 81 DEGREES

## 2018-08-16 LAB — BLASTOMYCES DERMATITIDIS: NORMAL

## 2018-09-26 ENCOUNTER — HOSPITAL ENCOUNTER (INPATIENT)
Age: 58
LOS: 6 days | Discharge: HOME | DRG: 193 | End: 2018-10-02
Attending: EMERGENCY MEDICINE | Admitting: FAMILY MEDICINE
Payer: MEDICARE

## 2018-09-26 ENCOUNTER — APPOINTMENT (OUTPATIENT)
Dept: GENERAL RADIOLOGY | Age: 58
DRG: 193 | End: 2018-09-26
Payer: MEDICARE

## 2018-09-26 DIAGNOSIS — A41.9 SEPSIS, DUE TO UNSPECIFIED ORGANISM: Primary | ICD-10-CM

## 2018-09-26 DIAGNOSIS — J18.9 PNEUMONIA DUE TO ORGANISM: ICD-10-CM

## 2018-09-26 LAB
ALBUMIN SERPL-MCNC: 4.3 G/DL (ref 3.5–5.2)
ALP BLD-CCNC: 79 U/L (ref 40–130)
ALT SERPL-CCNC: 14 U/L (ref 5–41)
ANION GAP SERPL CALCULATED.3IONS-SCNC: 11 MMOL/L (ref 7–19)
AST SERPL-CCNC: 15 U/L (ref 5–40)
BASOPHILS ABSOLUTE: 0.1 K/UL (ref 0–0.2)
BASOPHILS RELATIVE PERCENT: 0.5 % (ref 0–1)
BILIRUB SERPL-MCNC: 0.6 MG/DL (ref 0.2–1.2)
BUN BLDV-MCNC: 5 MG/DL (ref 8–23)
CALCIUM SERPL-MCNC: 9.5 MG/DL (ref 8.8–10.2)
CHLORIDE BLD-SCNC: 95 MMOL/L (ref 98–111)
CO2: 27 MMOL/L (ref 22–29)
CREAT SERPL-MCNC: <0.5 MG/DL (ref 0.5–1.2)
EOSINOPHILS ABSOLUTE: 0 K/UL (ref 0–0.6)
EOSINOPHILS RELATIVE PERCENT: 0.2 % (ref 0–5)
GFR NON-AFRICAN AMERICAN: >60
GLUCOSE BLD-MCNC: 125 MG/DL (ref 74–109)
HCT VFR BLD CALC: 44.3 % (ref 42–52)
HEMOGLOBIN: 14 G/DL (ref 14–18)
LACTIC ACID: 0.2 MMOL/L (ref 0.5–1.9)
LACTIC ACID: 1.8 MMOL/L (ref 0.5–1.9)
LYMPHOCYTES ABSOLUTE: 0.8 K/UL (ref 1.1–4.5)
LYMPHOCYTES RELATIVE PERCENT: 4.2 % (ref 20–40)
MCH RBC QN AUTO: 28 PG (ref 27–31)
MCHC RBC AUTO-ENTMCNC: 31.6 G/DL (ref 33–37)
MCV RBC AUTO: 88.6 FL (ref 80–94)
MONOCYTES ABSOLUTE: 1.4 K/UL (ref 0–0.9)
MONOCYTES RELATIVE PERCENT: 7.1 % (ref 0–10)
NEUTROPHILS ABSOLUTE: 16.8 K/UL (ref 1.5–7.5)
NEUTROPHILS RELATIVE PERCENT: 87.7 % (ref 50–65)
PDW BLD-RTO: 14.5 % (ref 11.5–14.5)
PLATELET # BLD: 343 K/UL (ref 130–400)
PMV BLD AUTO: 9.7 FL (ref 9.4–12.4)
POTASSIUM SERPL-SCNC: 4.2 MMOL/L (ref 3.5–5)
RBC # BLD: 5 M/UL (ref 4.7–6.1)
SODIUM BLD-SCNC: 133 MMOL/L (ref 136–145)
TOTAL PROTEIN: 7.1 G/DL (ref 6.6–8.7)
TROPONIN: 0.02 NG/ML (ref 0–0.03)
WBC # BLD: 19.1 K/UL (ref 4.8–10.8)

## 2018-09-26 PROCEDURE — 6370000000 HC RX 637 (ALT 250 FOR IP): Performed by: EMERGENCY MEDICINE

## 2018-09-26 PROCEDURE — 99285 EMERGENCY DEPT VISIT HI MDM: CPT

## 2018-09-26 PROCEDURE — 2580000003 HC RX 258: Performed by: FAMILY MEDICINE

## 2018-09-26 PROCEDURE — 6360000002 HC RX W HCPCS: Performed by: EMERGENCY MEDICINE

## 2018-09-26 PROCEDURE — 2700000000 HC OXYGEN THERAPY PER DAY

## 2018-09-26 PROCEDURE — 71045 X-RAY EXAM CHEST 1 VIEW: CPT

## 2018-09-26 PROCEDURE — 87040 BLOOD CULTURE FOR BACTERIA: CPT

## 2018-09-26 PROCEDURE — 83605 ASSAY OF LACTIC ACID: CPT

## 2018-09-26 PROCEDURE — 2580000003 HC RX 258: Performed by: EMERGENCY MEDICINE

## 2018-09-26 PROCEDURE — 84484 ASSAY OF TROPONIN QUANT: CPT

## 2018-09-26 PROCEDURE — 99285 EMERGENCY DEPT VISIT HI MDM: CPT | Performed by: EMERGENCY MEDICINE

## 2018-09-26 PROCEDURE — 85025 COMPLETE CBC W/AUTO DIFF WBC: CPT

## 2018-09-26 PROCEDURE — 96375 TX/PRO/DX INJ NEW DRUG ADDON: CPT

## 2018-09-26 PROCEDURE — 96365 THER/PROPH/DIAG IV INF INIT: CPT

## 2018-09-26 PROCEDURE — 80053 COMPREHEN METABOLIC PANEL: CPT

## 2018-09-26 PROCEDURE — 1210000000 HC MED SURG R&B

## 2018-09-26 PROCEDURE — 6360000002 HC RX W HCPCS: Performed by: FAMILY MEDICINE

## 2018-09-26 PROCEDURE — 93005 ELECTROCARDIOGRAM TRACING: CPT

## 2018-09-26 PROCEDURE — 36415 COLL VENOUS BLD VENIPUNCTURE: CPT

## 2018-09-26 PROCEDURE — 6370000000 HC RX 637 (ALT 250 FOR IP): Performed by: FAMILY MEDICINE

## 2018-09-26 PROCEDURE — 94640 AIRWAY INHALATION TREATMENT: CPT

## 2018-09-26 RX ORDER — METHYLPREDNISOLONE SODIUM SUCCINATE 125 MG/2ML
125 INJECTION, POWDER, LYOPHILIZED, FOR SOLUTION INTRAMUSCULAR; INTRAVENOUS ONCE
Status: COMPLETED | OUTPATIENT
Start: 2018-09-26 | End: 2018-09-26

## 2018-09-26 RX ORDER — METHYLPREDNISOLONE SODIUM SUCCINATE 40 MG/ML
40 INJECTION, POWDER, LYOPHILIZED, FOR SOLUTION INTRAMUSCULAR; INTRAVENOUS EVERY 8 HOURS
Status: DISCONTINUED | OUTPATIENT
Start: 2018-09-27 | End: 2018-09-29

## 2018-09-26 RX ORDER — SODIUM CHLORIDE 0.9 % (FLUSH) 0.9 %
10 SYRINGE (ML) INJECTION EVERY 12 HOURS SCHEDULED
Status: DISCONTINUED | OUTPATIENT
Start: 2018-09-26 | End: 2018-09-26

## 2018-09-26 RX ORDER — MIRTAZAPINE 15 MG/1
15 TABLET, FILM COATED ORAL NIGHTLY
Status: DISCONTINUED | OUTPATIENT
Start: 2018-09-26 | End: 2018-09-28

## 2018-09-26 RX ORDER — LISINOPRIL 5 MG/1
5 TABLET ORAL DAILY
Status: DISCONTINUED | OUTPATIENT
Start: 2018-09-27 | End: 2018-09-28

## 2018-09-26 RX ORDER — SODIUM CHLORIDE 9 MG/ML
INJECTION, SOLUTION INTRAVENOUS CONTINUOUS
Status: DISCONTINUED | OUTPATIENT
Start: 2018-09-26 | End: 2018-09-27

## 2018-09-26 RX ORDER — 0.9 % SODIUM CHLORIDE 0.9 %
1000 INTRAVENOUS SOLUTION INTRAVENOUS ONCE
Status: COMPLETED | OUTPATIENT
Start: 2018-09-26 | End: 2018-09-26

## 2018-09-26 RX ORDER — IPRATROPIUM BROMIDE AND ALBUTEROL SULFATE 2.5; .5 MG/3ML; MG/3ML
1 SOLUTION RESPIRATORY (INHALATION) ONCE
Status: COMPLETED | OUTPATIENT
Start: 2018-09-26 | End: 2018-09-26

## 2018-09-26 RX ORDER — ONDANSETRON 2 MG/ML
4 INJECTION INTRAMUSCULAR; INTRAVENOUS EVERY 6 HOURS PRN
Status: DISCONTINUED | OUTPATIENT
Start: 2018-09-26 | End: 2018-10-02 | Stop reason: HOSPADM

## 2018-09-26 RX ORDER — SODIUM CHLORIDE 0.9 % (FLUSH) 0.9 %
10 SYRINGE (ML) INJECTION PRN
Status: DISCONTINUED | OUTPATIENT
Start: 2018-09-26 | End: 2018-09-26

## 2018-09-26 RX ORDER — ALBUTEROL SULFATE 2.5 MG/3ML
2.5 SOLUTION RESPIRATORY (INHALATION) EVERY 4 HOURS PRN
Status: DISCONTINUED | OUTPATIENT
Start: 2018-09-26 | End: 2018-09-27

## 2018-09-26 RX ORDER — ACETAMINOPHEN 325 MG/1
650 TABLET ORAL EVERY 4 HOURS PRN
Status: DISCONTINUED | OUTPATIENT
Start: 2018-09-26 | End: 2018-10-02 | Stop reason: HOSPADM

## 2018-09-26 RX ORDER — SODIUM CHLORIDE 0.9 % (FLUSH) 0.9 %
10 SYRINGE (ML) INJECTION PRN
Status: DISCONTINUED | OUTPATIENT
Start: 2018-09-26 | End: 2018-10-02 | Stop reason: HOSPADM

## 2018-09-26 RX ORDER — SODIUM CHLORIDE 0.9 % (FLUSH) 0.9 %
10 SYRINGE (ML) INJECTION EVERY 12 HOURS SCHEDULED
Status: DISCONTINUED | OUTPATIENT
Start: 2018-09-26 | End: 2018-10-02 | Stop reason: HOSPADM

## 2018-09-26 RX ORDER — ALBUTEROL SULFATE 2.5 MG/3ML
2.5 SOLUTION RESPIRATORY (INHALATION)
Status: DISCONTINUED | OUTPATIENT
Start: 2018-09-26 | End: 2018-09-27

## 2018-09-26 RX ORDER — GUAIFENESIN 600 MG/1
1200 TABLET, EXTENDED RELEASE ORAL 2 TIMES DAILY
Status: DISCONTINUED | OUTPATIENT
Start: 2018-09-26 | End: 2018-10-02 | Stop reason: HOSPADM

## 2018-09-26 RX ORDER — VARENICLINE TARTRATE 0.5 MG/1
1 TABLET, FILM COATED ORAL 2 TIMES DAILY
Status: DISCONTINUED | OUTPATIENT
Start: 2018-09-26 | End: 2018-10-02 | Stop reason: HOSPADM

## 2018-09-26 RX ADMIN — AZITHROMYCIN MONOHYDRATE 500 MG: 500 INJECTION, POWDER, LYOPHILIZED, FOR SOLUTION INTRAVENOUS at 16:21

## 2018-09-26 RX ADMIN — IPRATROPIUM BROMIDE 0.5 MG: 0.5 SOLUTION RESPIRATORY (INHALATION) at 19:46

## 2018-09-26 RX ADMIN — WATER 1 G: 1 INJECTION INTRAMUSCULAR; INTRAVENOUS; SUBCUTANEOUS at 16:09

## 2018-09-26 RX ADMIN — METHYLPREDNISOLONE SODIUM SUCCINATE 125 MG: 125 INJECTION, POWDER, FOR SOLUTION INTRAMUSCULAR; INTRAVENOUS at 16:09

## 2018-09-26 RX ADMIN — GUAIFENESIN 1200 MG: 600 TABLET, EXTENDED RELEASE ORAL at 21:15

## 2018-09-26 RX ADMIN — ENOXAPARIN SODIUM 40 MG: 40 INJECTION SUBCUTANEOUS at 21:15

## 2018-09-26 RX ADMIN — MIRTAZAPINE 15 MG: 15 TABLET, FILM COATED ORAL at 21:15

## 2018-09-26 RX ADMIN — SODIUM CHLORIDE 1000 ML: 9 INJECTION, SOLUTION INTRAVENOUS at 16:08

## 2018-09-26 RX ADMIN — ALBUTEROL SULFATE 2.5 MG: 2.5 SOLUTION RESPIRATORY (INHALATION) at 19:47

## 2018-09-26 RX ADMIN — SODIUM CHLORIDE: 9 INJECTION, SOLUTION INTRAVENOUS at 19:32

## 2018-09-26 RX ADMIN — IPRATROPIUM BROMIDE AND ALBUTEROL SULFATE 1 AMPULE: .5; 3 SOLUTION RESPIRATORY (INHALATION) at 15:13

## 2018-09-26 RX ADMIN — VARENICLINE TARTRATE 1 MG: 0.5 TABLET, FILM COATED ORAL at 21:15

## 2018-09-26 ASSESSMENT — ENCOUNTER SYMPTOMS
VOMITING: 0
ABDOMINAL PAIN: 0
WHEEZING: 1
RHINORRHEA: 0
DIARRHEA: 0
SORE THROAT: 0
NAUSEA: 0
BACK PAIN: 0
COUGH: 1
SHORTNESS OF BREATH: 1

## 2018-09-26 NOTE — ED PROVIDER NOTES
[facility]  eMERGENCY dEPARTMENT eNCOUnter      Pt Name: Jax Murrieta  MRN: [id number]  [place] 1960  Date of evaluation: 9/26/2018  Provider: Leif Craig MD    CHIEF COMPLAINT       Chief Complaint   Patient presents with    Shortness of Breath     x couple of weeks         HISTORY OF PRESENT ILLNESS   (Location/Symptom, Timing/Onset, Context/Setting, Quality, Duration, Modifying Factors, Severity)  Note limiting factors. Jax Murrieta is a 59 y.o. male who presents to the emergency department For progressive shortness of breath over the past 2 weeks. Patient reports a productive cough with white sputum. He also reports subjective fevers. He states he was hospitalized August 7 14 with pneumonia. Patient also states he has lost 2030 pounds over the last 6 months and has decreased appetite as well. Patient states he was told he possibly could have cancer but unable to find any exact source but they are concerned due to his weight loss. He wears oxygen at night and 2-2.5 liters during the day. He denies any chest pain. HPI    Nursing Notes were reviewed. REVIEW OF SYSTEMS    (2-9 systems for level 4, 10 or more for level 5)     Review of Systems   Constitutional: Positive for appetite change, chills, fatigue, fever and unexpected weight change. HENT: Negative for rhinorrhea and sore throat. Respiratory: Positive for cough, shortness of breath and wheezing. Cardiovascular: Negative for chest pain and leg swelling. Gastrointestinal: Negative for abdominal pain, diarrhea, nausea and vomiting. Genitourinary: Negative for dysuria and frequency. Musculoskeletal: Negative for back pain and neck pain. Neurological: Negative for dizziness and headaches. All other systems reviewed and are negative.            PAST MEDICAL HISTORY     Past Medical History:   Diagnosis Date    Alcohol abuse     Anxiety     Colon polyp     COPD (chronic obstructive pulmonary disease) [facility] [address]  COPD exacerbation (HCC)     Hyperlipidemia     Hypertension          SURGICAL HISTORY       Past Surgical History:   Procedure Laterality Date    CARPAL TUNNEL RELEASE      COLONOSCOPY  08/23/2012    : moderate diverticulosis and small polyp         CURRENT MEDICATIONS       Previous Medications    ALBUTEROL SULFATE (PROAIR HFA IN)    Inhale 2 puffs into the lungs 4 times daily     GUAIFENESIN (MUCINEX) 600 MG SR TABLET    Take 1 tablet by mouth 2 times daily    LISINOPRIL (PRINIVIL;ZESTRIL) 5 MG TABLET    Take 1 tablet by mouth daily    MIRTAZAPINE (REMERON) 15 MG TABLET    Take 1 tablet by mouth nightly    MOMETASONE-FORMOTEROL (DULERA) 200-5 MCG/ACT INHALER    Inhale 2 puffs into the lungs 2 times daily    MULTIPLE VITAMIN (MULTI-VITAMIN PO)    Take  by mouth. OXYGEN    Inhale 2 L into the lungs continuous    ROFLUMILAST (DALIRESP) 500 MCG TABLET    Take 500 mcg by mouth daily    TIOTROPIUM BROMIDE MONOHYDRATE (SPIRIVA HANDIHALER IN)    Inhale 1 puff into the lungs daily     VARENICLINE (CHANTIX STARTING MONTH PAK) 0.5 MG X 11 & 1 MG X 42 TABLET    Take by mouth.        ALLERGIES     Pcn [penicillins]    FAMILY HISTORY       Family History   Problem Relation Age of Onset    Colon Cancer Mother     Colon Polyps Mother     Emphysema Father     Diabetes Father     Prostate Cancer Paternal Grandfather           SOCIAL HISTORY       Social History     Social History    Marital status:      Spouse name: N/A    Number of children: N/A    Years of education: N/A     Social History Main Topics    Smoking status: Current Every Day Smoker     Packs/day: 0.50     Years: 30.00     Types: Cigarettes    Smokeless tobacco: Never Used    Alcohol use No      Comment: quit x 1 year    Drug use: No    Sexual activity: Not Asked     Other Topics Concern    None     Social History Narrative    None       SCREENINGS             PHYSICAL EXAM    (up to 7 for level 4, 8 or more for level 5) specified.     DISCHARGE MEDICATIONS:  New Prescriptions    No medications on file          (Please note that portions of this note were completed with a voice recognition program.  Efforts were made to edit the dictations but occasionally words are mis-transcribed.)    Willis Ramos MD (electronically signed)  Attending Emergency Physician       Thais Quevdeo MD  09/26/18 9826

## 2018-09-27 LAB
ALBUMIN SERPL-MCNC: 2.6 G/DL (ref 3.5–5.2)
ALP BLD-CCNC: 48 U/L (ref 40–130)
ALT SERPL-CCNC: 8 U/L (ref 5–41)
ANION GAP SERPL CALCULATED.3IONS-SCNC: 9 MMOL/L (ref 7–19)
AST SERPL-CCNC: 9 U/L (ref 5–40)
BASOPHILS ABSOLUTE: 0 K/UL (ref 0–0.2)
BASOPHILS RELATIVE PERCENT: 0.1 % (ref 0–1)
BILIRUB SERPL-MCNC: <0.2 MG/DL (ref 0.2–1.2)
BUN BLDV-MCNC: 7 MG/DL (ref 8–23)
CALCIUM SERPL-MCNC: 7.5 MG/DL (ref 8.8–10.2)
CHLORIDE BLD-SCNC: 109 MMOL/L (ref 98–111)
CO2: 21 MMOL/L (ref 22–29)
CREAT SERPL-MCNC: <0.5 MG/DL (ref 0.5–1.2)
EOSINOPHILS ABSOLUTE: 0 K/UL (ref 0–0.6)
EOSINOPHILS RELATIVE PERCENT: 0 % (ref 0–5)
GFR NON-AFRICAN AMERICAN: >60
GLUCOSE BLD-MCNC: 129 MG/DL (ref 74–109)
HCT VFR BLD CALC: 30.9 % (ref 42–52)
HEMOGLOBIN: 9.8 G/DL (ref 14–18)
LYMPHOCYTES ABSOLUTE: 0.4 K/UL (ref 1.1–4.5)
LYMPHOCYTES RELATIVE PERCENT: 3.5 % (ref 20–40)
MCH RBC QN AUTO: 28.6 PG (ref 27–31)
MCHC RBC AUTO-ENTMCNC: 31.7 G/DL (ref 33–37)
MCV RBC AUTO: 90.1 FL (ref 80–94)
MONOCYTES ABSOLUTE: 0.1 K/UL (ref 0–0.9)
MONOCYTES RELATIVE PERCENT: 1 % (ref 0–10)
NEUTROPHILS ABSOLUTE: 10.8 K/UL (ref 1.5–7.5)
NEUTROPHILS RELATIVE PERCENT: 95 % (ref 50–65)
PDW BLD-RTO: 14.5 % (ref 11.5–14.5)
PLATELET # BLD: 265 K/UL (ref 130–400)
PMV BLD AUTO: 10.8 FL (ref 9.4–12.4)
POTASSIUM REFLEX MAGNESIUM: 4 MMOL/L (ref 3.5–5)
RBC # BLD: 3.43 M/UL (ref 4.7–6.1)
SODIUM BLD-SCNC: 139 MMOL/L (ref 136–145)
TOTAL PROTEIN: 4.6 G/DL (ref 6.6–8.7)
WBC # BLD: 11.3 K/UL (ref 4.8–10.8)

## 2018-09-27 PROCEDURE — 85025 COMPLETE CBC W/AUTO DIFF WBC: CPT

## 2018-09-27 PROCEDURE — 6370000000 HC RX 637 (ALT 250 FOR IP): Performed by: NURSE PRACTITIONER

## 2018-09-27 PROCEDURE — 6360000002 HC RX W HCPCS: Performed by: FAMILY MEDICINE

## 2018-09-27 PROCEDURE — 1210000000 HC MED SURG R&B

## 2018-09-27 PROCEDURE — 2700000000 HC OXYGEN THERAPY PER DAY

## 2018-09-27 PROCEDURE — 94640 AIRWAY INHALATION TREATMENT: CPT

## 2018-09-27 PROCEDURE — 36415 COLL VENOUS BLD VENIPUNCTURE: CPT

## 2018-09-27 PROCEDURE — 80053 COMPREHEN METABOLIC PANEL: CPT

## 2018-09-27 PROCEDURE — 2580000003 HC RX 258: Performed by: FAMILY MEDICINE

## 2018-09-27 PROCEDURE — 6370000000 HC RX 637 (ALT 250 FOR IP): Performed by: FAMILY MEDICINE

## 2018-09-27 RX ORDER — IPRATROPIUM BROMIDE AND ALBUTEROL SULFATE 2.5; .5 MG/3ML; MG/3ML
1 SOLUTION RESPIRATORY (INHALATION)
Status: DISCONTINUED | OUTPATIENT
Start: 2018-09-27 | End: 2018-10-02 | Stop reason: HOSPADM

## 2018-09-27 RX ADMIN — ALBUTEROL SULFATE 2.5 MG: 2.5 SOLUTION RESPIRATORY (INHALATION) at 07:10

## 2018-09-27 RX ADMIN — METHYLPREDNISOLONE SODIUM SUCCINATE 40 MG: 40 INJECTION, POWDER, FOR SOLUTION INTRAMUSCULAR; INTRAVENOUS at 17:26

## 2018-09-27 RX ADMIN — ROFLUMILAST 500 MCG: 500 TABLET ORAL at 09:07

## 2018-09-27 RX ADMIN — CEFTRIAXONE 1 G: 1 INJECTION, POWDER, FOR SOLUTION INTRAMUSCULAR; INTRAVENOUS at 17:38

## 2018-09-27 RX ADMIN — VARENICLINE TARTRATE 1 MG: 0.5 TABLET, FILM COATED ORAL at 09:07

## 2018-09-27 RX ADMIN — LISINOPRIL 5 MG: 5 TABLET ORAL at 09:07

## 2018-09-27 RX ADMIN — METHYLPREDNISOLONE SODIUM SUCCINATE 40 MG: 40 INJECTION, POWDER, FOR SOLUTION INTRAMUSCULAR; INTRAVENOUS at 00:10

## 2018-09-27 RX ADMIN — ENOXAPARIN SODIUM 40 MG: 40 INJECTION SUBCUTANEOUS at 21:18

## 2018-09-27 RX ADMIN — ALBUTEROL SULFATE 2.5 MG: 2.5 SOLUTION RESPIRATORY (INHALATION) at 14:41

## 2018-09-27 RX ADMIN — ALBUTEROL SULFATE 2.5 MG: 2.5 SOLUTION RESPIRATORY (INHALATION) at 10:46

## 2018-09-27 RX ADMIN — METHYLPREDNISOLONE SODIUM SUCCINATE 40 MG: 40 INJECTION, POWDER, FOR SOLUTION INTRAMUSCULAR; INTRAVENOUS at 09:07

## 2018-09-27 RX ADMIN — METHYLPREDNISOLONE SODIUM SUCCINATE 40 MG: 40 INJECTION, POWDER, FOR SOLUTION INTRAMUSCULAR; INTRAVENOUS at 23:08

## 2018-09-27 RX ADMIN — AZITHROMYCIN MONOHYDRATE 500 MG: 500 INJECTION, POWDER, LYOPHILIZED, FOR SOLUTION INTRAVENOUS at 17:25

## 2018-09-27 RX ADMIN — IPRATROPIUM BROMIDE AND ALBUTEROL SULFATE 1 AMPULE: .5; 3 SOLUTION RESPIRATORY (INHALATION) at 19:11

## 2018-09-27 RX ADMIN — VARENICLINE TARTRATE 1 MG: 0.5 TABLET, FILM COATED ORAL at 21:18

## 2018-09-27 RX ADMIN — MIRTAZAPINE 15 MG: 15 TABLET, FILM COATED ORAL at 21:18

## 2018-09-27 RX ADMIN — GUAIFENESIN 1200 MG: 600 TABLET, EXTENDED RELEASE ORAL at 09:07

## 2018-09-27 RX ADMIN — Medication 10 ML: at 09:09

## 2018-09-27 RX ADMIN — MOMETASONE FUROATE AND FORMOTEROL FUMARATE DIHYDRATE 2 PUFF: 200; 5 AEROSOL RESPIRATORY (INHALATION) at 21:16

## 2018-09-27 RX ADMIN — GUAIFENESIN 1200 MG: 600 TABLET, EXTENDED RELEASE ORAL at 17:26

## 2018-09-27 NOTE — H&P
CHIEF COMPLAINT:  Shortness of breath and cough    History Obtained From:  patient     HISTORY OF PRESENT ILLNESS:      The patient is a 59 y.o. male with significant past medical history of COPD with ongoing tobacco use who presents with 2-3 weeks of increasing shortness of breath. When asked specifically why he did not call the office prior to presentation to the emergency room he did not have a good answer. He's had some subjective fever and chills. His cough is been productive for some yellow to green sputum. During this time his appetite diminished and he has lost more weight. It worsened to the point where he felt he needed emergency room evaluation. Upon evaluation is found to have some new right lower lobe infiltrates on chest x-ray    Past Medical History:   Diagnosis Date    Alcohol abuse     Anxiety     Colon polyp     COPD (chronic obstructive pulmonary disease) ([address]     COPD exacerbation (HCC)     Hyperlipidemia     Hypertension        Past Surgical History:   Procedure Laterality Date    CARPAL TUNNEL RELEASE      COLONOSCOPY  08/23/2012    : moderate diverticulosis and small polyp       Medications Prior to Admission:    Prescriptions Prior to Admission: mirtazapine (REMERON) 15 MG tablet, Take 1 tablet by mouth nightly  lisinopril (PRINIVIL;ZESTRIL) 5 MG tablet, Take 1 tablet by mouth daily  mometasone-formoterol (DULERA) 200-5 MCG/ACT inhaler, Inhale 2 puffs into the lungs 2 times daily  Roflumilast (DALIRESP) 500 MCG tablet, Take 500 mcg by mouth daily  guaiFENesin (MUCINEX) 600 MG SR tablet, Take 1 tablet by mouth 2 times daily (Patient taking differently: Take 1,200 mg by mouth 2 times daily )  Multiple Vitamin (MULTI-VITAMIN PO), Take  by mouth.     Albuterol Sulfate (PROAIR HFA IN), Inhale 2 puffs into the lungs 4 times daily   Tiotropium Bromide Monohydrate (SPIRIVA HANDIHALER IN), Inhale 1 puff into the lungs daily   varenicline (CHANTIX STARTING MONTH PAK) 0.5 MG X 11 & 1

## 2018-09-28 LAB
ALBUMIN SERPL-MCNC: 3.2 G/DL (ref 3.5–5.2)
ALP BLD-CCNC: 68 U/L (ref 40–130)
ALT SERPL-CCNC: 13 U/L (ref 5–41)
ANION GAP SERPL CALCULATED.3IONS-SCNC: 10 MMOL/L (ref 7–19)
AST SERPL-CCNC: 14 U/L (ref 5–40)
BASOPHILS ABSOLUTE: 0 K/UL (ref 0–0.2)
BASOPHILS RELATIVE PERCENT: 0.1 % (ref 0–1)
BILIRUB SERPL-MCNC: <0.2 MG/DL (ref 0.2–1.2)
BUN BLDV-MCNC: 14 MG/DL (ref 8–23)
CALCIUM SERPL-MCNC: 9.2 MG/DL (ref 8.8–10.2)
CHLORIDE BLD-SCNC: 99 MMOL/L (ref 98–111)
CO2: 29 MMOL/L (ref 22–29)
CREAT SERPL-MCNC: 0.5 MG/DL (ref 0.5–1.2)
EOSINOPHILS ABSOLUTE: 0 K/UL (ref 0–0.6)
EOSINOPHILS RELATIVE PERCENT: 0 % (ref 0–5)
GFR NON-AFRICAN AMERICAN: >60
GLUCOSE BLD-MCNC: 214 MG/DL (ref 74–109)
HCT VFR BLD CALC: 32.2 % (ref 42–52)
HEMOGLOBIN: 10.3 G/DL (ref 14–18)
LYMPHOCYTES ABSOLUTE: 0.5 K/UL (ref 1.1–4.5)
LYMPHOCYTES RELATIVE PERCENT: 2.1 % (ref 20–40)
MCH RBC QN AUTO: 28.5 PG (ref 27–31)
MCHC RBC AUTO-ENTMCNC: 32 G/DL (ref 33–37)
MCV RBC AUTO: 89.2 FL (ref 80–94)
MONOCYTES ABSOLUTE: 0.3 K/UL (ref 0–0.9)
MONOCYTES RELATIVE PERCENT: 1.5 % (ref 0–10)
NEUTROPHILS ABSOLUTE: 21.5 K/UL (ref 1.5–7.5)
NEUTROPHILS RELATIVE PERCENT: 95.5 % (ref 50–65)
PDW BLD-RTO: 14.7 % (ref 11.5–14.5)
PLATELET # BLD: 286 K/UL (ref 130–400)
PMV BLD AUTO: 10.5 FL (ref 9.4–12.4)
POTASSIUM SERPL-SCNC: 4.5 MMOL/L (ref 3.5–5)
RBC # BLD: 3.61 M/UL (ref 4.7–6.1)
SODIUM BLD-SCNC: 138 MMOL/L (ref 136–145)
TOTAL PROTEIN: 4.8 G/DL (ref 6.6–8.7)
WBC # BLD: 22.5 K/UL (ref 4.8–10.8)

## 2018-09-28 PROCEDURE — 2700000000 HC OXYGEN THERAPY PER DAY

## 2018-09-28 PROCEDURE — 94640 AIRWAY INHALATION TREATMENT: CPT

## 2018-09-28 PROCEDURE — 6360000002 HC RX W HCPCS: Performed by: FAMILY MEDICINE

## 2018-09-28 PROCEDURE — 6370000000 HC RX 637 (ALT 250 FOR IP): Performed by: NURSE PRACTITIONER

## 2018-09-28 PROCEDURE — 6370000000 HC RX 637 (ALT 250 FOR IP): Performed by: FAMILY MEDICINE

## 2018-09-28 PROCEDURE — 80053 COMPREHEN METABOLIC PANEL: CPT

## 2018-09-28 PROCEDURE — 36415 COLL VENOUS BLD VENIPUNCTURE: CPT

## 2018-09-28 PROCEDURE — 85025 COMPLETE CBC W/AUTO DIFF WBC: CPT

## 2018-09-28 PROCEDURE — 1210000000 HC MED SURG R&B

## 2018-09-28 PROCEDURE — 2580000003 HC RX 258: Performed by: FAMILY MEDICINE

## 2018-09-28 RX ORDER — MIRTAZAPINE 15 MG/1
30 TABLET, FILM COATED ORAL NIGHTLY
Status: DISCONTINUED | OUTPATIENT
Start: 2018-09-28 | End: 2018-10-02 | Stop reason: HOSPADM

## 2018-09-28 RX ADMIN — METHYLPREDNISOLONE SODIUM SUCCINATE 40 MG: 40 INJECTION, POWDER, FOR SOLUTION INTRAMUSCULAR; INTRAVENOUS at 09:16

## 2018-09-28 RX ADMIN — Medication 10 ML: at 21:22

## 2018-09-28 RX ADMIN — ROFLUMILAST 500 MCG: 500 TABLET ORAL at 09:18

## 2018-09-28 RX ADMIN — Medication 10 ML: at 09:17

## 2018-09-28 RX ADMIN — GUAIFENESIN 1200 MG: 600 TABLET, EXTENDED RELEASE ORAL at 09:17

## 2018-09-28 RX ADMIN — AZITHROMYCIN MONOHYDRATE 500 MG: 500 INJECTION, POWDER, LYOPHILIZED, FOR SOLUTION INTRAVENOUS at 16:13

## 2018-09-28 RX ADMIN — MIRTAZAPINE 30 MG: 15 TABLET, FILM COATED ORAL at 21:22

## 2018-09-28 RX ADMIN — GUAIFENESIN 1200 MG: 600 TABLET, EXTENDED RELEASE ORAL at 18:16

## 2018-09-28 RX ADMIN — ENOXAPARIN SODIUM 40 MG: 40 INJECTION SUBCUTANEOUS at 21:22

## 2018-09-28 RX ADMIN — VARENICLINE TARTRATE 1 MG: 0.5 TABLET, FILM COATED ORAL at 21:22

## 2018-09-28 RX ADMIN — IPRATROPIUM BROMIDE AND ALBUTEROL SULFATE 1 AMPULE: .5; 3 SOLUTION RESPIRATORY (INHALATION) at 15:26

## 2018-09-28 RX ADMIN — IPRATROPIUM BROMIDE AND ALBUTEROL SULFATE 1 AMPULE: .5; 3 SOLUTION RESPIRATORY (INHALATION) at 11:00

## 2018-09-28 RX ADMIN — IPRATROPIUM BROMIDE AND ALBUTEROL SULFATE 1 AMPULE: .5; 3 SOLUTION RESPIRATORY (INHALATION) at 20:18

## 2018-09-28 RX ADMIN — IPRATROPIUM BROMIDE AND ALBUTEROL SULFATE 1 AMPULE: .5; 3 SOLUTION RESPIRATORY (INHALATION) at 07:20

## 2018-09-28 RX ADMIN — METHYLPREDNISOLONE SODIUM SUCCINATE 40 MG: 40 INJECTION, POWDER, FOR SOLUTION INTRAMUSCULAR; INTRAVENOUS at 16:13

## 2018-09-28 RX ADMIN — MOMETASONE FUROATE AND FORMOTEROL FUMARATE DIHYDRATE 2 PUFF: 200; 5 AEROSOL RESPIRATORY (INHALATION) at 09:17

## 2018-09-28 RX ADMIN — MOMETASONE FUROATE AND FORMOTEROL FUMARATE DIHYDRATE 2 PUFF: 200; 5 AEROSOL RESPIRATORY (INHALATION) at 22:32

## 2018-09-28 RX ADMIN — VARENICLINE TARTRATE 1 MG: 0.5 TABLET, FILM COATED ORAL at 09:18

## 2018-09-28 RX ADMIN — CEFTRIAXONE 1 G: 1 INJECTION, POWDER, FOR SOLUTION INTRAMUSCULAR; INTRAVENOUS at 18:16

## 2018-09-28 ASSESSMENT — PAIN SCALES - GENERAL: PAINLEVEL_OUTOF10: 0

## 2018-09-28 NOTE — CARE COORDINATION
SW provided paperwork to pt for [facility] COPD program to pt again. Pt stated he would review paperwork but was not sure he needed the services. SW said to consider these services as they may be beneficial to pt. SW will continue to follow and assist as needed.

## 2018-09-29 LAB
ANION GAP SERPL CALCULATED.3IONS-SCNC: 8 MMOL/L (ref 7–19)
BASOPHILS ABSOLUTE: 0 K/UL (ref 0–0.2)
BASOPHILS RELATIVE PERCENT: 0.1 % (ref 0–1)
BUN BLDV-MCNC: 12 MG/DL (ref 8–23)
CALCIUM SERPL-MCNC: 9.3 MG/DL (ref 8.8–10.2)
CHLORIDE BLD-SCNC: 100 MMOL/L (ref 98–111)
CO2: 28 MMOL/L (ref 22–29)
CREAT SERPL-MCNC: <0.5 MG/DL (ref 0.5–1.2)
EOSINOPHILS ABSOLUTE: 0 K/UL (ref 0–0.6)
EOSINOPHILS RELATIVE PERCENT: 0 % (ref 0–5)
GFR NON-AFRICAN AMERICAN: >60
GLUCOSE BLD-MCNC: 153 MG/DL (ref 74–109)
HCT VFR BLD CALC: 33.9 % (ref 42–52)
HEMOGLOBIN: 10.8 G/DL (ref 14–18)
LYMPHOCYTES ABSOLUTE: 0.3 K/UL (ref 1.1–4.5)
LYMPHOCYTES RELATIVE PERCENT: 1.8 % (ref 20–40)
MCH RBC QN AUTO: 28.6 PG (ref 27–31)
MCHC RBC AUTO-ENTMCNC: 31.9 G/DL (ref 33–37)
MCV RBC AUTO: 89.9 FL (ref 80–94)
MONOCYTES ABSOLUTE: 0.5 K/UL (ref 0–0.9)
MONOCYTES RELATIVE PERCENT: 2.6 % (ref 0–10)
NEUTROPHILS ABSOLUTE: 17.6 K/UL (ref 1.5–7.5)
NEUTROPHILS RELATIVE PERCENT: 94.9 % (ref 50–65)
PDW BLD-RTO: 15.1 % (ref 11.5–14.5)
PLATELET # BLD: 313 K/UL (ref 130–400)
PMV BLD AUTO: 10.9 FL (ref 9.4–12.4)
POTASSIUM SERPL-SCNC: 4.6 MMOL/L (ref 3.5–5)
RBC # BLD: 3.77 M/UL (ref 4.7–6.1)
SODIUM BLD-SCNC: 136 MMOL/L (ref 136–145)
WBC # BLD: 18.5 K/UL (ref 4.8–10.8)

## 2018-09-29 PROCEDURE — 80048 BASIC METABOLIC PNL TOTAL CA: CPT

## 2018-09-29 PROCEDURE — 6370000000 HC RX 637 (ALT 250 FOR IP): Performed by: NURSE PRACTITIONER

## 2018-09-29 PROCEDURE — 6360000002 HC RX W HCPCS: Performed by: INTERNAL MEDICINE

## 2018-09-29 PROCEDURE — 6370000000 HC RX 637 (ALT 250 FOR IP): Performed by: FAMILY MEDICINE

## 2018-09-29 PROCEDURE — 2700000000 HC OXYGEN THERAPY PER DAY

## 2018-09-29 PROCEDURE — 2580000003 HC RX 258: Performed by: FAMILY MEDICINE

## 2018-09-29 PROCEDURE — 6360000002 HC RX W HCPCS: Performed by: FAMILY MEDICINE

## 2018-09-29 PROCEDURE — 1210000000 HC MED SURG R&B

## 2018-09-29 PROCEDURE — 94640 AIRWAY INHALATION TREATMENT: CPT

## 2018-09-29 PROCEDURE — 85025 COMPLETE CBC W/AUTO DIFF WBC: CPT

## 2018-09-29 PROCEDURE — 36415 COLL VENOUS BLD VENIPUNCTURE: CPT

## 2018-09-29 RX ORDER — METHYLPREDNISOLONE SODIUM SUCCINATE 40 MG/ML
40 INJECTION, POWDER, LYOPHILIZED, FOR SOLUTION INTRAMUSCULAR; INTRAVENOUS EVERY 12 HOURS
Status: DISCONTINUED | OUTPATIENT
Start: 2018-09-29 | End: 2018-10-01

## 2018-09-29 RX ADMIN — Medication 10 ML: at 08:54

## 2018-09-29 RX ADMIN — GUAIFENESIN 1200 MG: 600 TABLET, EXTENDED RELEASE ORAL at 17:54

## 2018-09-29 RX ADMIN — IPRATROPIUM BROMIDE AND ALBUTEROL SULFATE 1 AMPULE: .5; 3 SOLUTION RESPIRATORY (INHALATION) at 07:41

## 2018-09-29 RX ADMIN — MOMETASONE FUROATE AND FORMOTEROL FUMARATE DIHYDRATE 2 PUFF: 200; 5 AEROSOL RESPIRATORY (INHALATION) at 08:53

## 2018-09-29 RX ADMIN — ACETAMINOPHEN 650 MG: 325 TABLET ORAL at 20:29

## 2018-09-29 RX ADMIN — METHYLPREDNISOLONE SODIUM SUCCINATE 40 MG: 40 INJECTION, POWDER, FOR SOLUTION INTRAMUSCULAR; INTRAVENOUS at 00:29

## 2018-09-29 RX ADMIN — Medication 10 ML: at 20:30

## 2018-09-29 RX ADMIN — VARENICLINE TARTRATE 1 MG: 0.5 TABLET, FILM COATED ORAL at 08:54

## 2018-09-29 RX ADMIN — GUAIFENESIN 1200 MG: 600 TABLET, EXTENDED RELEASE ORAL at 08:54

## 2018-09-29 RX ADMIN — IPRATROPIUM BROMIDE AND ALBUTEROL SULFATE 1 AMPULE: .5; 3 SOLUTION RESPIRATORY (INHALATION) at 19:46

## 2018-09-29 RX ADMIN — AZITHROMYCIN MONOHYDRATE 500 MG: 500 INJECTION, POWDER, LYOPHILIZED, FOR SOLUTION INTRAVENOUS at 16:52

## 2018-09-29 RX ADMIN — METHYLPREDNISOLONE SODIUM SUCCINATE 40 MG: 40 INJECTION, POWDER, FOR SOLUTION INTRAMUSCULAR; INTRAVENOUS at 21:28

## 2018-09-29 RX ADMIN — IPRATROPIUM BROMIDE AND ALBUTEROL SULFATE 1 AMPULE: .5; 3 SOLUTION RESPIRATORY (INHALATION) at 15:23

## 2018-09-29 RX ADMIN — CEFTRIAXONE 1 G: 1 INJECTION, POWDER, FOR SOLUTION INTRAMUSCULAR; INTRAVENOUS at 17:14

## 2018-09-29 RX ADMIN — VARENICLINE TARTRATE 1 MG: 0.5 TABLET, FILM COATED ORAL at 20:30

## 2018-09-29 RX ADMIN — MOMETASONE FUROATE AND FORMOTEROL FUMARATE DIHYDRATE 2 PUFF: 200; 5 AEROSOL RESPIRATORY (INHALATION) at 20:29

## 2018-09-29 RX ADMIN — IPRATROPIUM BROMIDE AND ALBUTEROL SULFATE 1 AMPULE: .5; 3 SOLUTION RESPIRATORY (INHALATION) at 10:53

## 2018-09-29 RX ADMIN — ENOXAPARIN SODIUM 40 MG: 40 INJECTION SUBCUTANEOUS at 20:30

## 2018-09-29 RX ADMIN — MIRTAZAPINE 30 MG: 15 TABLET, FILM COATED ORAL at 20:30

## 2018-09-29 RX ADMIN — ROFLUMILAST 500 MCG: 500 TABLET ORAL at 08:54

## 2018-09-29 RX ADMIN — METHYLPREDNISOLONE SODIUM SUCCINATE 40 MG: 40 INJECTION, POWDER, FOR SOLUTION INTRAMUSCULAR; INTRAVENOUS at 08:54

## 2018-09-29 ASSESSMENT — PAIN SCALES - GENERAL
PAINLEVEL_OUTOF10: 2
PAINLEVEL_OUTOF10: 7

## 2018-09-30 PROCEDURE — 2700000000 HC OXYGEN THERAPY PER DAY

## 2018-09-30 PROCEDURE — 6370000000 HC RX 637 (ALT 250 FOR IP): Performed by: NURSE PRACTITIONER

## 2018-09-30 PROCEDURE — 87205 SMEAR GRAM STAIN: CPT

## 2018-09-30 PROCEDURE — 2580000003 HC RX 258: Performed by: FAMILY MEDICINE

## 2018-09-30 PROCEDURE — 94640 AIRWAY INHALATION TREATMENT: CPT

## 2018-09-30 PROCEDURE — 6370000000 HC RX 637 (ALT 250 FOR IP): Performed by: FAMILY MEDICINE

## 2018-09-30 PROCEDURE — 1210000000 HC MED SURG R&B

## 2018-09-30 PROCEDURE — 87070 CULTURE OTHR SPECIMN AEROBIC: CPT

## 2018-09-30 PROCEDURE — 6360000002 HC RX W HCPCS: Performed by: INTERNAL MEDICINE

## 2018-09-30 PROCEDURE — 6360000002 HC RX W HCPCS: Performed by: FAMILY MEDICINE

## 2018-09-30 RX ADMIN — MOMETASONE FUROATE AND FORMOTEROL FUMARATE DIHYDRATE 2 PUFF: 200; 5 AEROSOL RESPIRATORY (INHALATION) at 08:14

## 2018-09-30 RX ADMIN — VARENICLINE TARTRATE 1 MG: 0.5 TABLET, FILM COATED ORAL at 08:13

## 2018-09-30 RX ADMIN — GUAIFENESIN 1200 MG: 600 TABLET, EXTENDED RELEASE ORAL at 17:01

## 2018-09-30 RX ADMIN — Medication 10 ML: at 20:41

## 2018-09-30 RX ADMIN — MIRTAZAPINE 30 MG: 15 TABLET, FILM COATED ORAL at 20:41

## 2018-09-30 RX ADMIN — AZITHROMYCIN MONOHYDRATE 500 MG: 500 INJECTION, POWDER, LYOPHILIZED, FOR SOLUTION INTRAVENOUS at 15:57

## 2018-09-30 RX ADMIN — MOMETASONE FUROATE AND FORMOTEROL FUMARATE DIHYDRATE 2 PUFF: 200; 5 AEROSOL RESPIRATORY (INHALATION) at 20:00

## 2018-09-30 RX ADMIN — IPRATROPIUM BROMIDE AND ALBUTEROL SULFATE 1 AMPULE: .5; 3 SOLUTION RESPIRATORY (INHALATION) at 15:25

## 2018-09-30 RX ADMIN — METHYLPREDNISOLONE SODIUM SUCCINATE 40 MG: 40 INJECTION, POWDER, FOR SOLUTION INTRAMUSCULAR; INTRAVENOUS at 21:56

## 2018-09-30 RX ADMIN — IPRATROPIUM BROMIDE AND ALBUTEROL SULFATE 1 AMPULE: .5; 3 SOLUTION RESPIRATORY (INHALATION) at 10:45

## 2018-09-30 RX ADMIN — GUAIFENESIN 1200 MG: 600 TABLET, EXTENDED RELEASE ORAL at 08:13

## 2018-09-30 RX ADMIN — ACETAMINOPHEN 650 MG: 325 TABLET ORAL at 15:47

## 2018-09-30 RX ADMIN — METHYLPREDNISOLONE SODIUM SUCCINATE 40 MG: 40 INJECTION, POWDER, FOR SOLUTION INTRAMUSCULAR; INTRAVENOUS at 08:14

## 2018-09-30 RX ADMIN — ACETAMINOPHEN 650 MG: 325 TABLET ORAL at 00:20

## 2018-09-30 RX ADMIN — IPRATROPIUM BROMIDE AND ALBUTEROL SULFATE 1 AMPULE: .5; 3 SOLUTION RESPIRATORY (INHALATION) at 19:30

## 2018-09-30 RX ADMIN — IPRATROPIUM BROMIDE AND ALBUTEROL SULFATE 1 AMPULE: .5; 3 SOLUTION RESPIRATORY (INHALATION) at 06:49

## 2018-09-30 RX ADMIN — ENOXAPARIN SODIUM 40 MG: 40 INJECTION SUBCUTANEOUS at 20:41

## 2018-09-30 RX ADMIN — CEFTRIAXONE 1 G: 1 INJECTION, POWDER, FOR SOLUTION INTRAMUSCULAR; INTRAVENOUS at 15:32

## 2018-09-30 RX ADMIN — Medication 10 ML: at 10:02

## 2018-09-30 RX ADMIN — ROFLUMILAST 500 MCG: 500 TABLET ORAL at 08:13

## 2018-09-30 RX ADMIN — VARENICLINE TARTRATE 1 MG: 0.5 TABLET, FILM COATED ORAL at 20:41

## 2018-09-30 ASSESSMENT — PAIN SCALES - GENERAL
PAINLEVEL_OUTOF10: 7
PAINLEVEL_OUTOF10: 5
PAINLEVEL_OUTOF10: 0
PAINLEVEL_OUTOF10: 5

## 2018-10-01 ENCOUNTER — APPOINTMENT (OUTPATIENT)
Dept: GENERAL RADIOLOGY | Age: 58
DRG: 193 | End: 2018-10-01
Payer: MEDICARE

## 2018-10-01 LAB
BLOOD CULTURE, ROUTINE: NORMAL
CULTURE, BLOOD 2: NORMAL

## 2018-10-01 PROCEDURE — 1210000000 HC MED SURG R&B

## 2018-10-01 PROCEDURE — 2700000000 HC OXYGEN THERAPY PER DAY

## 2018-10-01 PROCEDURE — 71046 X-RAY EXAM CHEST 2 VIEWS: CPT

## 2018-10-01 PROCEDURE — 94640 AIRWAY INHALATION TREATMENT: CPT

## 2018-10-01 PROCEDURE — 6360000002 HC RX W HCPCS: Performed by: FAMILY MEDICINE

## 2018-10-01 PROCEDURE — 6370000000 HC RX 637 (ALT 250 FOR IP): Performed by: FAMILY MEDICINE

## 2018-10-01 PROCEDURE — 2580000003 HC RX 258: Performed by: FAMILY MEDICINE

## 2018-10-01 PROCEDURE — 6370000000 HC RX 637 (ALT 250 FOR IP): Performed by: NURSE PRACTITIONER

## 2018-10-01 RX ORDER — PREDNISONE 20 MG/1
20 TABLET ORAL DAILY
Status: DISCONTINUED | OUTPATIENT
Start: 2018-10-01 | End: 2018-10-02 | Stop reason: HOSPADM

## 2018-10-01 RX ORDER — CEFDINIR 300 MG/1
300 CAPSULE ORAL EVERY 12 HOURS SCHEDULED
Status: DISCONTINUED | OUTPATIENT
Start: 2018-10-01 | End: 2018-10-02 | Stop reason: HOSPADM

## 2018-10-01 RX ADMIN — VARENICLINE TARTRATE 1 MG: 0.5 TABLET, FILM COATED ORAL at 21:37

## 2018-10-01 RX ADMIN — MIRTAZAPINE 30 MG: 15 TABLET, FILM COATED ORAL at 21:37

## 2018-10-01 RX ADMIN — IPRATROPIUM BROMIDE AND ALBUTEROL SULFATE 1 AMPULE: .5; 3 SOLUTION RESPIRATORY (INHALATION) at 15:31

## 2018-10-01 RX ADMIN — GUAIFENESIN 1200 MG: 600 TABLET, EXTENDED RELEASE ORAL at 09:25

## 2018-10-01 RX ADMIN — CEFDINIR 300 MG: 300 CAPSULE ORAL at 09:26

## 2018-10-01 RX ADMIN — VARENICLINE TARTRATE 1 MG: 0.5 TABLET, FILM COATED ORAL at 09:25

## 2018-10-01 RX ADMIN — Medication 10 ML: at 09:27

## 2018-10-01 RX ADMIN — ACETAMINOPHEN 650 MG: 325 TABLET ORAL at 14:42

## 2018-10-01 RX ADMIN — MOMETASONE FUROATE AND FORMOTEROL FUMARATE DIHYDRATE 2 PUFF: 200; 5 AEROSOL RESPIRATORY (INHALATION) at 09:27

## 2018-10-01 RX ADMIN — IPRATROPIUM BROMIDE AND ALBUTEROL SULFATE 1 AMPULE: .5; 3 SOLUTION RESPIRATORY (INHALATION) at 19:00

## 2018-10-01 RX ADMIN — PREDNISONE 20 MG: 20 TABLET ORAL at 09:26

## 2018-10-01 RX ADMIN — IPRATROPIUM BROMIDE AND ALBUTEROL SULFATE 1 AMPULE: .5; 3 SOLUTION RESPIRATORY (INHALATION) at 10:59

## 2018-10-01 RX ADMIN — CEFDINIR 300 MG: 300 CAPSULE ORAL at 21:37

## 2018-10-01 RX ADMIN — ENOXAPARIN SODIUM 40 MG: 40 INJECTION SUBCUTANEOUS at 21:37

## 2018-10-01 RX ADMIN — GUAIFENESIN 1200 MG: 600 TABLET, EXTENDED RELEASE ORAL at 17:43

## 2018-10-01 RX ADMIN — ROFLUMILAST 500 MCG: 500 TABLET ORAL at 09:25

## 2018-10-01 RX ADMIN — IPRATROPIUM BROMIDE AND ALBUTEROL SULFATE 1 AMPULE: .5; 3 SOLUTION RESPIRATORY (INHALATION) at 06:38

## 2018-10-01 RX ADMIN — MOMETASONE FUROATE AND FORMOTEROL FUMARATE DIHYDRATE 2 PUFF: 200; 5 AEROSOL RESPIRATORY (INHALATION) at 21:38

## 2018-10-01 ASSESSMENT — ENCOUNTER SYMPTOMS
SHORTNESS OF BREATH: 0
VOMITING: 0
NAUSEA: 0
ABDOMINAL PAIN: 0
DIARRHEA: 0

## 2018-10-01 ASSESSMENT — PAIN SCALES - GENERAL
PAINLEVEL_OUTOF10: 3
PAINLEVEL_OUTOF10: 0

## 2018-10-01 NOTE — PROGRESS NOTES
12 hour chart check completed.  Electronically signed by Latisha Fofana RN on 9/30/2018 at 4:22 PM
Pulmonary and Critical Care Progress Note [facility]    Patient: Bria Martinez  1960   MR# [id number]   Acct# [de-identified]  09/28/18   10:32 AM  Referring Provider: Florentin Dubose MD  Problem list:   Patient Active Problem List   Diagnosis    Family history of colon cancer    Family history of colonic polyps    Benign colon polyp    S/P colonoscopic polypectomy    Diverticulosis of colon    Loose stools    Abdominal cramping    History of colon polyps    Abnormal CT of the abdomen    Panlobular emphysema ([address]    Hyperlipidemia    Hypertension    Nicotine dependence, cigarettes, uncomplicated    Acute exacerbation of chronic obstructive airways disease ([address]    COPD (chronic [facility] disease) [facility]    Pneumonia of right lower lobe due to Pseudomonas species ([facility]    Pneumonia    Abnormal weight loss    Severe malnutrition ([facility]     Chief Complaint: Dyspnea    Interval history: Patient indicates that his breathing is not any better today but also not any worse. He understands that it may not likely get much better because \"my lung function is 24%\". He does continue to smoke and is aware that this is not helpful. He has not new issues or complaints. No family present. He is sitting up in the chair on NC. He is not distressed. No other aggravating or alleviating factors or associated symptoms. mirtazapine 30 mg Oral Nightly   ipratropium-albuterol 1 ampule Inhalation Q4H WA   mometasone-formoterol 2 puff Inhalation BID   sodium chloride flush 10 mL Intravenous 2 times per day   enoxaparin 40 mg Subcutaneous Nightly   guaiFENesin 1,200 mg Oral BID   Roflumilast 500 mcg Oral Daily   varenicline 1 mg Oral BID   methylPREDNISolone 40 mg Intravenous Q8H   cefTRIAXone (ROCEPHIN) IV 1 g Intravenous Q24H   azithromycin 500 mg Intravenous Q24H       Review of Systems:   ROS:    Constitutional: He reports some \"off and on\" fever. He has had weight loss.   HEENT:
height 5' 9\" (1.753 m), weight 110 lb (49.9 kg), SpO2 100 %. Intake/Output Summary (Last 24 hours) at 10/01/18 0959  Last data filed at 10/01/18 0428   Gross per 24 hour   Intake              920 ml   Output              350 ml   Net              570 ml     Physical Exam:  Gen.: He is a chronically ill-appearing white male. He's standing up next to the bedside chair. HEENT:   Eyes: Pupils are equal round reactive to light. Neck: Supple without JVD. Chest: Diminished breath sounds throughout. Cardiac: No murmur or gallop noted. SR   Abdomen: Soft. Extremities: No cyanosis clubbing or edema noted. Musculoskeletal: No joint deformity is noted. Muscle wasting is present in the extremities. Dermatologic: No rash noted. Neurologic: No focal deficits noted. Psychiatric: He has a flat affect. He is oriented. Lymphatic: No adenopathy palpated. Recent Labs      09/29/18   0332   WBC  18.5*   RBC  3.77*   HGB  10.8*   HCT  33.9*   PLT  313   MCV  89.9   MCH  28.6   MCHC  31.9*   RDW  15.1*      Recent Labs      09/29/18   0332   NA  136   K  4.6   CL  100   CO2  28   BUN  12   CREATININE  <0.5   CALCIUM  9.3   GLUCOSE  153*      Recent Labs      09/30/18   1215   LABGRAM  Rare Epithelial Cells  Few WBC's (Polymorphonuclear)  Rare Gram positive cocci  in pairs  Rare Gram positive rods         Radiograph:   EXAMINATION: XR CHEST (2 VW) 10/1/2018 7:52 AM   HISTORY: Left-sided chest wall pain   COMPARISON: 9/26/2018   FINDINGS:   Emphysematous changes are again seen bilaterally with hyperinflation. There is relative lucency on the left, stable from the previous exam.   There is chronic blunting of the right costophrenic angle, possibly   related to scarring or fluid. Subtle lateral right lower lobe   opacities persist but have slightly improved compared to the previous   exam. There is redemonstration of a similar density in the right   perihilar and infrahilar region. There is no appreciable pneumothorax.
15   --    --   9  14   --    ALT  14   --    --   8  13   --    ALKPHOS  79   --    --   48  68   --    BILITOT  0.6   --    --   <0.2  <0.2   --    TROPONINI  0.02   --    --    --    --    --    LACTA   --    < >  0.2*   --    --    --     < > = values in this interval not displayed. Recent Labs      09/26/18   1550   BC  No Growth to date. Any change in status will be called. Radiograph: none today  My radiograph interpretation: none today    Pulmonary Assessment:    1. RLL opacity  2. COPD, very severe  3. Emphysema  4. Bronchiectasis with lung scarring  5. Chronic tobacco abuse  6. Weight loss  7. Chronic hypoxemic respiratory failure     Recommend:     · Continue abx coverage  · Continue bronchodilators  · Continue supplemental oxygen for sat above 90%  · Continue mucolytics. Pt wishes to not take it when his stomach is full as he does not think it helps as much that way as it does when he takes it on a empty stomach with a full glass of water. OK to hold and take when he feels its appropriate  · Understands that he needs to stop smoking  · Mobilize as tolerated  · He is hopeful to go home the [place] part of the week\"    Electronically signed by John Mckeon on 9/29/2018 at 8:57 AM    Physician's substantive portion:  Subjective: he hopes to be ready to go home in a few days. Says breathing is bad and is always bad, worse with exertion  Objective: ill appearing. abd scaphoid, chest diminished breath sounds. Awake and alert  Assessment/recommendation: copd / bronchiectasis exacerbation slowly improving. Moderate anemia stable, hyperglycemia, steroid-induced. Try to reduce steroids. I have seen and examined patient personally, performing a face-to-face diagnostic evaluation with plan of care reviewed and developed with APRN. I have addended and/or modified the above history of present illness, physical examination, and assessment and plan to reflect my findings and impressions.  Essential

## 2018-10-01 NOTE — PLAN OF CARE
Problem: Nutrition  Goal: Optimal nutrition therapy  Nutrition Problem: Unintended weight loss, Severe malnutrition  Intervention: Food and/or Nutrient Delivery: Continue current diet, Start ONS  Nutritional Goals: po intake 75% or greater. Weight stable or increase 1-3# per week     Outcome: Ongoing  Nutrition Problem: Unintended weight loss, Severe malnutrition  Intervention: Food and/or Nutrient Delivery: Continue current diet, Continue current ONS  Nutritional Goals: po intake 75% or greater.   Weight stable or increase 1-3# per week

## 2018-10-02 VITALS
WEIGHT: 110 LBS | HEART RATE: 82 BPM | DIASTOLIC BLOOD PRESSURE: 60 MMHG | SYSTOLIC BLOOD PRESSURE: 100 MMHG | TEMPERATURE: 97.7 F | BODY MASS INDEX: 16.29 KG/M2 | OXYGEN SATURATION: 96 % | RESPIRATION RATE: 20 BRPM | HEIGHT: 69 IN

## 2018-10-02 LAB
CULTURE, RESPIRATORY: NORMAL
GRAM STAIN RESULT: NORMAL

## 2018-10-02 PROCEDURE — 2700000000 HC OXYGEN THERAPY PER DAY

## 2018-10-02 PROCEDURE — 6370000000 HC RX 637 (ALT 250 FOR IP): Performed by: FAMILY MEDICINE

## 2018-10-02 PROCEDURE — 6370000000 HC RX 637 (ALT 250 FOR IP): Performed by: NURSE PRACTITIONER

## 2018-10-02 PROCEDURE — 94640 AIRWAY INHALATION TREATMENT: CPT

## 2018-10-02 RX ORDER — VARENICLINE TARTRATE 1 MG/1
1 TABLET, FILM COATED ORAL 2 TIMES DAILY
Qty: 60 TABLET | Refills: 3 | Status: SHIPPED | OUTPATIENT
Start: 2018-10-02 | End: 2018-12-02

## 2018-10-02 RX ORDER — PREDNISONE 10 MG/1
TABLET ORAL
Qty: 30 TABLET | Refills: 0 | Status: SHIPPED | OUTPATIENT
Start: 2018-10-02 | End: 2018-12-02 | Stop reason: DRUGHIGH

## 2018-10-02 RX ORDER — MIRTAZAPINE 30 MG/1
30 TABLET, FILM COATED ORAL NIGHTLY
Qty: 30 TABLET | Refills: 3 | Status: ON HOLD | OUTPATIENT
Start: 2018-10-02 | End: 2019-01-01

## 2018-10-02 RX ORDER — CEFDINIR 300 MG/1
300 CAPSULE ORAL EVERY 12 HOURS SCHEDULED
Qty: 20 CAPSULE | Refills: 0 | Status: SHIPPED | OUTPATIENT
Start: 2018-10-02 | End: 2018-10-12

## 2018-10-02 RX ADMIN — VARENICLINE TARTRATE 1 MG: 0.5 TABLET, FILM COATED ORAL at 07:50

## 2018-10-02 RX ADMIN — MOMETASONE FUROATE AND FORMOTEROL FUMARATE DIHYDRATE 2 PUFF: 200; 5 AEROSOL RESPIRATORY (INHALATION) at 07:51

## 2018-10-02 RX ADMIN — IPRATROPIUM BROMIDE AND ALBUTEROL SULFATE 1 AMPULE: .5; 3 SOLUTION RESPIRATORY (INHALATION) at 07:23

## 2018-10-02 RX ADMIN — PREDNISONE 20 MG: 20 TABLET ORAL at 07:50

## 2018-10-02 RX ADMIN — CEFDINIR 300 MG: 300 CAPSULE ORAL at 07:50

## 2018-10-02 RX ADMIN — GUAIFENESIN 1200 MG: 600 TABLET, EXTENDED RELEASE ORAL at 07:50

## 2018-10-02 RX ADMIN — ROFLUMILAST 500 MCG: 500 TABLET ORAL at 07:50

## 2018-10-02 ASSESSMENT — PAIN SCALES - GENERAL: PAINLEVEL_OUTOF10: 0

## 2018-10-03 LAB
EKG P AXIS: 85 DEGREES
EKG P-R INTERVAL: 132 MS
EKG Q-T INTERVAL: 304 MS
EKG QRS DURATION: 78 MS
EKG QTC CALCULATION (BAZETT): 412 MS
EKG T AXIS: 82 DEGREES

## 2018-11-15 ENCOUNTER — TELEPHONE (OUTPATIENT)
Dept: PULMONOLOGY | Facility: CLINIC | Age: 58
End: 2018-11-15

## 2018-11-15 RX ORDER — AMOXICILLIN AND CLAVULANATE POTASSIUM 875; 125 MG/1; MG/1
1 TABLET, FILM COATED ORAL EVERY 12 HOURS SCHEDULED
Qty: 20 TABLET | Refills: 0 | Status: SHIPPED | OUTPATIENT
Start: 2018-11-15 | End: 2019-01-23

## 2018-11-15 NOTE — TELEPHONE ENCOUNTER
He has pneumonia, coughing up yellow sputum in the AM and other part is white.  Chest tight and ribs hurt.  Low-grade fever. He is taking the tylenol for the hurting and the fever.  He has taking levaquin and doxycycline, he prefers something else.

## 2018-11-16 RX ORDER — CEFDINIR 300 MG/1
300 CAPSULE ORAL 2 TIMES DAILY
Qty: 20 CAPSULE | Refills: 0 | Status: SHIPPED | OUTPATIENT
Start: 2018-11-16 | End: 2018-11-26

## 2018-11-16 NOTE — TELEPHONE ENCOUNTER
Patient called back and said that he is allergic to PCN and the pharmacy said the med we sent in had PCN in it.   Do you want him to take this or change the medicine?

## 2018-12-01 ENCOUNTER — HOSPITAL ENCOUNTER (INPATIENT)
Age: 58
LOS: 3 days | Discharge: HOME | DRG: 190 | End: 2018-12-05
Attending: EMERGENCY MEDICINE | Admitting: FAMILY MEDICINE
Payer: MEDICARE

## 2018-12-01 ENCOUNTER — APPOINTMENT (OUTPATIENT)
Dept: GENERAL RADIOLOGY | Age: 58
DRG: 190 | End: 2018-12-01
Payer: MEDICARE

## 2018-12-01 DIAGNOSIS — R06.89 DYSPNEA AND RESPIRATORY ABNORMALITIES: ICD-10-CM

## 2018-12-01 DIAGNOSIS — J44.1 COPD EXACERBATION (HCC): Primary | ICD-10-CM

## 2018-12-01 DIAGNOSIS — R06.00 DYSPNEA AND RESPIRATORY ABNORMALITIES: ICD-10-CM

## 2018-12-01 DIAGNOSIS — F17.210 CIGARETTE SMOKER: ICD-10-CM

## 2018-12-01 LAB
ALBUMIN SERPL-MCNC: 3.8 G/DL (ref 3.5–5.2)
ALP BLD-CCNC: 72 U/L (ref 40–130)
ALT SERPL-CCNC: 16 U/L (ref 5–41)
ANION GAP SERPL CALCULATED.3IONS-SCNC: 9 MMOL/L (ref 7–19)
AST SERPL-CCNC: 17 U/L (ref 5–40)
BASE EXCESS ARTERIAL: 4.3 MMOL/L (ref -2–2)
BASOPHILS ABSOLUTE: 0.1 K/UL (ref 0–0.2)
BASOPHILS RELATIVE PERCENT: 0.6 % (ref 0–1)
BILIRUB SERPL-MCNC: <0.2 MG/DL (ref 0.2–1.2)
BUN BLDV-MCNC: 4 MG/DL (ref 8–23)
CALCIUM SERPL-MCNC: 9.5 MG/DL (ref 8.8–10.2)
CARBOXYHEMOGLOBIN ARTERIAL: 8.4 % (ref 0–5)
CHLORIDE BLD-SCNC: 99 MMOL/L (ref 98–111)
CO2: 30 MMOL/L (ref 22–29)
CREAT SERPL-MCNC: 0.5 MG/DL (ref 0.5–1.2)
EOSINOPHILS ABSOLUTE: 0.2 K/UL (ref 0–0.6)
EOSINOPHILS RELATIVE PERCENT: 1.4 % (ref 0–5)
GFR NON-AFRICAN AMERICAN: >60
GLUCOSE BLD-MCNC: 112 MG/DL (ref 74–109)
HCO3 ARTERIAL: 29.2 MMOL/L (ref 22–26)
HCT VFR BLD CALC: 41.1 % (ref 42–52)
HEMOGLOBIN, ART, EXTENDED: 12.3 G/DL (ref 14–18)
HEMOGLOBIN: 12.9 G/DL (ref 14–18)
LYMPHOCYTES ABSOLUTE: 1.7 K/UL (ref 1.1–4.5)
LYMPHOCYTES RELATIVE PERCENT: 16 % (ref 20–40)
MAGNESIUM: 2.1 MG/DL (ref 1.6–2.4)
MCH RBC QN AUTO: 28.4 PG (ref 27–31)
MCHC RBC AUTO-ENTMCNC: 31.4 G/DL (ref 33–37)
MCV RBC AUTO: 90.3 FL (ref 80–94)
METHEMOGLOBIN ARTERIAL: 1 %
MONOCYTES ABSOLUTE: 1.1 K/UL (ref 0–0.9)
MONOCYTES RELATIVE PERCENT: 10.6 % (ref 0–10)
NEUTROPHILS ABSOLUTE: 7.6 K/UL (ref 1.5–7.5)
NEUTROPHILS RELATIVE PERCENT: 71.1 % (ref 50–65)
O2 CONTENT ARTERIAL: 15.5 ML/DL
O2 SAT, ARTERIAL: 89 %
O2 THERAPY: ABNORMAL
PCO2 ARTERIAL: 44 MMHG (ref 35–45)
PDW BLD-RTO: 14.6 % (ref 11.5–14.5)
PH ARTERIAL: 7.43 (ref 7.35–7.45)
PLATELET # BLD: 378 K/UL (ref 130–400)
PMV BLD AUTO: 10.4 FL (ref 9.4–12.4)
PO2 ARTERIAL: 77 MMHG (ref 80–100)
POTASSIUM REFLEX MAGNESIUM: 3.5 MMOL/L (ref 3.5–5)
POTASSIUM, WHOLE BLOOD: 3.2
PRO-BNP: 98 PG/ML (ref 0–900)
RBC # BLD: 4.55 M/UL (ref 4.7–6.1)
SODIUM BLD-SCNC: 138 MMOL/L (ref 136–145)
TOTAL PROTEIN: 6.8 G/DL (ref 6.6–8.7)
TROPONIN: <0.01 NG/ML (ref 0–0.03)
WBC # BLD: 10.7 K/UL (ref 4.8–10.8)

## 2018-12-01 PROCEDURE — 83735 ASSAY OF MAGNESIUM: CPT

## 2018-12-01 PROCEDURE — 84484 ASSAY OF TROPONIN QUANT: CPT

## 2018-12-01 PROCEDURE — 99285 EMERGENCY DEPT VISIT HI MDM: CPT

## 2018-12-01 PROCEDURE — 85025 COMPLETE CBC W/AUTO DIFF WBC: CPT

## 2018-12-01 PROCEDURE — 6370000000 HC RX 637 (ALT 250 FOR IP): Performed by: EMERGENCY MEDICINE

## 2018-12-01 PROCEDURE — 36415 COLL VENOUS BLD VENIPUNCTURE: CPT

## 2018-12-01 PROCEDURE — 93005 ELECTROCARDIOGRAM TRACING: CPT

## 2018-12-01 PROCEDURE — 94664 DEMO&/EVAL PT USE INHALER: CPT

## 2018-12-01 PROCEDURE — 71045 X-RAY EXAM CHEST 1 VIEW: CPT

## 2018-12-01 PROCEDURE — 80053 COMPREHEN METABOLIC PANEL: CPT

## 2018-12-01 PROCEDURE — 36600 WITHDRAWAL OF ARTERIAL BLOOD: CPT

## 2018-12-01 PROCEDURE — 94640 AIRWAY INHALATION TREATMENT: CPT

## 2018-12-01 PROCEDURE — 83880 ASSAY OF NATRIURETIC PEPTIDE: CPT

## 2018-12-01 RX ORDER — IPRATROPIUM BROMIDE AND ALBUTEROL SULFATE 2.5; .5 MG/3ML; MG/3ML
1 SOLUTION RESPIRATORY (INHALATION) ONCE
Status: COMPLETED | OUTPATIENT
Start: 2018-12-01 | End: 2018-12-01

## 2018-12-01 RX ADMIN — IPRATROPIUM BROMIDE AND ALBUTEROL SULFATE 1 AMPULE: .5; 3 SOLUTION RESPIRATORY (INHALATION) at 23:05

## 2018-12-01 NOTE — LETTER
including skilled nursing facilities, home health agencies, inpatient rehabilitation facilities, and long term care hospitals. [facility] is working closely with the doctors and other health care providers that care for you during and following your hospital stay and for a period of time after you leave the hospital. By working together, the health care providers are trying to more efficiently provide well-managed, high quality, patient-centered care as you undergo treatment. Hospitals, doctors, and other health care providers that care for you following a hospital stay may receive an additional payment for providing better, more coordinated health care. Medicare will monitor your care to make sure you and others get high quality care. Your feedback is important     Medicare may also ask you to answer a survey about the services and care you received from [employer] will be mailed to you. Your feedback will improve care for all people with Medicare who receive care from [facility]. Completion of this survey is optional.     Get more information     For more information about the Bundled Payments for [address] [place] [address], you can:    · Visit the CMS BPCI Advanced Website at http://maier-perez.net/ initiatives/bpci-advanced   · Call the [facility] team at ([phone]) [phone]. · Call 1-800-MEDICARE (9-982.936.6722). TTY users can call 3-378.242.5170     If you have concerns or complaints about your care, talk to your health care provider, or contact your Beneficiary and Family Centered Quality Improvement Organization [facility]). To get your [facility]'s phone number, visit Medicare.gov/contacts or call 1-800-MEDICARE. · To find a different hospital, visit www. hospitalcompare.[facility].gov or call 1-800Naymit MEDICARE (1-611.242.6665). TTY users should call 0-713.338.3789.

## 2018-12-02 PROBLEM — J44.1 COPD EXACERBATION (HCC): Status: ACTIVE | Noted: 2018-12-02

## 2018-12-02 PROCEDURE — 6370000000 HC RX 637 (ALT 250 FOR IP): Performed by: EMERGENCY MEDICINE

## 2018-12-02 PROCEDURE — 6370000000 HC RX 637 (ALT 250 FOR IP): Performed by: INTERNAL MEDICINE

## 2018-12-02 PROCEDURE — 1210000000 HC MED SURG R&B

## 2018-12-02 PROCEDURE — 94640 AIRWAY INHALATION TREATMENT: CPT

## 2018-12-02 PROCEDURE — 6360000002 HC RX W HCPCS: Performed by: EMERGENCY MEDICINE

## 2018-12-02 PROCEDURE — 2700000000 HC OXYGEN THERAPY PER DAY

## 2018-12-02 PROCEDURE — 6360000002 HC RX W HCPCS: Performed by: INTERNAL MEDICINE

## 2018-12-02 PROCEDURE — 99285 EMERGENCY DEPT VISIT HI MDM: CPT | Performed by: EMERGENCY MEDICINE

## 2018-12-02 PROCEDURE — 82803 BLOOD GASES ANY COMBINATION: CPT

## 2018-12-02 PROCEDURE — 84132 ASSAY OF SERUM POTASSIUM: CPT

## 2018-12-02 PROCEDURE — 2580000003 HC RX 258: Performed by: INTERNAL MEDICINE

## 2018-12-02 RX ORDER — PREDNISONE 10 MG/1
TABLET ORAL
Qty: 30 TABLET | Refills: 0 | Status: SHIPPED | OUTPATIENT
Start: 2018-12-02 | End: 2019-01-01 | Stop reason: ALTCHOICE

## 2018-12-02 RX ORDER — MIRTAZAPINE 15 MG/1
30 TABLET, FILM COATED ORAL NIGHTLY
Status: DISCONTINUED | OUTPATIENT
Start: 2018-12-02 | End: 2018-12-05 | Stop reason: HOSPADM

## 2018-12-02 RX ORDER — METHYLPREDNISOLONE SODIUM SUCCINATE 40 MG/ML
40 INJECTION, POWDER, LYOPHILIZED, FOR SOLUTION INTRAMUSCULAR; INTRAVENOUS EVERY 8 HOURS
Status: DISCONTINUED | OUTPATIENT
Start: 2018-12-02 | End: 2018-12-04

## 2018-12-02 RX ORDER — SODIUM CHLORIDE 9 MG/ML
INJECTION, SOLUTION INTRAVENOUS CONTINUOUS
Status: DISCONTINUED | OUTPATIENT
Start: 2018-12-02 | End: 2018-12-02

## 2018-12-02 RX ORDER — SODIUM CHLORIDE 0.9 % (FLUSH) 0.9 %
10 SYRINGE (ML) INJECTION PRN
Status: DISCONTINUED | OUTPATIENT
Start: 2018-12-02 | End: 2018-12-05 | Stop reason: HOSPADM

## 2018-12-02 RX ORDER — NICOTINE 21 MG/24HR
1 PATCH, TRANSDERMAL 24 HOURS TRANSDERMAL DAILY
Status: DISCONTINUED | OUTPATIENT
Start: 2018-12-02 | End: 2018-12-05 | Stop reason: HOSPADM

## 2018-12-02 RX ORDER — VARENICLINE TARTRATE 0.5 MG/1
1 TABLET, FILM COATED ORAL 2 TIMES DAILY WITH MEALS
Status: DISCONTINUED | OUTPATIENT
Start: 2018-12-02 | End: 2018-12-05 | Stop reason: HOSPADM

## 2018-12-02 RX ORDER — IPRATROPIUM BROMIDE AND ALBUTEROL SULFATE 2.5; .5 MG/3ML; MG/3ML
1 SOLUTION RESPIRATORY (INHALATION)
Status: DISCONTINUED | OUTPATIENT
Start: 2018-12-02 | End: 2018-12-05 | Stop reason: HOSPADM

## 2018-12-02 RX ORDER — SODIUM CHLORIDE 0.9 % (FLUSH) 0.9 %
10 SYRINGE (ML) INJECTION EVERY 12 HOURS SCHEDULED
Status: DISCONTINUED | OUTPATIENT
Start: 2018-12-02 | End: 2018-12-05 | Stop reason: HOSPADM

## 2018-12-02 RX ORDER — GUAIFENESIN 600 MG/1
1200 TABLET, EXTENDED RELEASE ORAL 2 TIMES DAILY
Status: DISCONTINUED | OUTPATIENT
Start: 2018-12-02 | End: 2018-12-05 | Stop reason: HOSPADM

## 2018-12-02 RX ORDER — LEVOFLOXACIN 5 MG/ML
500 INJECTION, SOLUTION INTRAVENOUS EVERY 24 HOURS
Status: DISCONTINUED | OUTPATIENT
Start: 2018-12-02 | End: 2018-12-04

## 2018-12-02 RX ORDER — ONDANSETRON 2 MG/ML
4 INJECTION INTRAMUSCULAR; INTRAVENOUS EVERY 6 HOURS PRN
Status: DISCONTINUED | OUTPATIENT
Start: 2018-12-02 | End: 2018-12-05 | Stop reason: HOSPADM

## 2018-12-02 RX ORDER — IPRATROPIUM BROMIDE AND ALBUTEROL SULFATE 2.5; .5 MG/3ML; MG/3ML
1 SOLUTION RESPIRATORY (INHALATION) PRN
Status: DISCONTINUED | OUTPATIENT
Start: 2018-12-02 | End: 2018-12-02

## 2018-12-02 RX ADMIN — MIRTAZAPINE 30 MG: 15 TABLET, FILM COATED ORAL at 20:40

## 2018-12-02 RX ADMIN — ROFLUMILAST 500 MCG: 500 TABLET ORAL at 08:48

## 2018-12-02 RX ADMIN — GUAIFENESIN 1200 MG: 600 TABLET, EXTENDED RELEASE ORAL at 20:39

## 2018-12-02 RX ADMIN — IPRATROPIUM BROMIDE AND ALBUTEROL SULFATE 1 AMPULE: 2.5; .5 SOLUTION RESPIRATORY (INHALATION) at 10:53

## 2018-12-02 RX ADMIN — SODIUM CHLORIDE: 9 INJECTION, SOLUTION INTRAVENOUS at 04:09

## 2018-12-02 RX ADMIN — IPRATROPIUM BROMIDE AND ALBUTEROL SULFATE 1 AMPULE: 2.5; .5 SOLUTION RESPIRATORY (INHALATION) at 14:29

## 2018-12-02 RX ADMIN — IPRATROPIUM BROMIDE AND ALBUTEROL SULFATE 1 AMPULE: 2.5; .5 SOLUTION RESPIRATORY (INHALATION) at 06:27

## 2018-12-02 RX ADMIN — DEXAMETHASONE 10 MG: 4 TABLET ORAL at 02:39

## 2018-12-02 RX ADMIN — Medication 10 ML: at 08:49

## 2018-12-02 RX ADMIN — Medication 10 ML: at 20:40

## 2018-12-02 RX ADMIN — METHYLPREDNISOLONE SODIUM SUCCINATE 40 MG: 40 INJECTION, POWDER, FOR SOLUTION INTRAMUSCULAR; INTRAVENOUS at 11:19

## 2018-12-02 RX ADMIN — ENOXAPARIN SODIUM 40 MG: 40 INJECTION SUBCUTANEOUS at 08:48

## 2018-12-02 RX ADMIN — IPRATROPIUM BROMIDE AND ALBUTEROL SULFATE 1 AMPULE: 2.5; .5 SOLUTION RESPIRATORY (INHALATION) at 18:55

## 2018-12-02 RX ADMIN — METHYLPREDNISOLONE SODIUM SUCCINATE 40 MG: 40 INJECTION, POWDER, FOR SOLUTION INTRAMUSCULAR; INTRAVENOUS at 18:35

## 2018-12-02 RX ADMIN — LEVOFLOXACIN 500 MG: 5 INJECTION, SOLUTION INTRAVENOUS at 04:09

## 2018-12-02 RX ADMIN — GUAIFENESIN 1200 MG: 600 TABLET, EXTENDED RELEASE ORAL at 08:48

## 2018-12-02 ASSESSMENT — ENCOUNTER SYMPTOMS
DIARRHEA: 0
SHORTNESS OF BREATH: 1
NAUSEA: 0
COUGH: 1
CONSTIPATION: 0
SORE THROAT: 0
VOICE CHANGE: 0
APNEA: 0
BLOOD IN STOOL: 0
SINUS PRESSURE: 0
FACIAL SWELLING: 0
WHEEZING: 1
EYE DISCHARGE: 0
CHOKING: 0

## 2018-12-02 NOTE — ED PROVIDER NOTES
[facility] EMERGENCY DEPT  eMERGENCY dEPARTMENT eNCOUnter      Pt Name: Anais Sharma  MRN: [id number]  [place] 1960  Date of evaluation: 12/1/2018  Provider: Lisy Syed MD    CHIEF COMPLAINT       Chief Complaint   Patient presents with    Shortness of Breath     SOB x 2 weeks, pt has COPD, been coughing up grren sputum per pt. States he has had to use his 02 everyday, zahra for the past 2 weeks typically at 2L but has been using 3-4L pt not sure for how lomg though. HISTORY OF PRESENT ILLNESS   (Location/Symptom, Timing/Onset,Context/Setting, Quality, Duration, Modifying Factors, Severity)  Note limiting factors. Anais Sharma is a 59 y.o. male who presents to the emergency department Evaluation of shortness of breath. 25-year-old male COPD with home oxygen. Complains that he has been short of breath and any spelled on 3 rounds of antibiotics. But no steroids. He has oxygen at home nebulizers and inhalers. He has chronic lung disease and COPD. He continues to smoke about half a pack of cigarettes a day. We talked about this and he says he lives smoking. He's not tell me about fever and chills his cough is discolored. He is on Mucinex. He states she's feeling better since he's gotten here and concerned maybe it's his environment as one of the reasons for his shortness of breath. He also received DuoNeb this may have helped since does not want his regular medications. The history is provided by the patient and medical records. NursingNotes were reviewed. REVIEW OF SYSTEMS    (2-9 systems for level 4, 10 or more for level 5)     Review of Systems   Constitutional: Negative for chills and fever. HENT: Negative for congestion, drooling, facial swelling, nosebleeds, sinus pressure, sore throat and voice change. Eyes: Negative for discharge. Respiratory: Positive for cough, shortness of breath and wheezing. Negative for apnea and choking.     Cardiovascular: Negative for

## 2018-12-02 NOTE — ED NOTES
CXR at bedside     Aurelia Matute, [facility]  12/01/18 4314

## 2018-12-03 ENCOUNTER — OUTSIDE FACILITY SERVICE (OUTPATIENT)
Dept: PULMONOLOGY | Facility: CLINIC | Age: 58
End: 2018-12-03

## 2018-12-03 PROBLEM — J96.11 CHRONIC RESPIRATORY FAILURE WITH HYPOXIA (HCC): Chronic | Status: ACTIVE | Noted: 2018-12-03

## 2018-12-03 PROBLEM — Z51.5 PALLIATIVE CARE PATIENT: Status: ACTIVE | Noted: 2018-12-03

## 2018-12-03 PROCEDURE — 6360000002 HC RX W HCPCS: Performed by: INTERNAL MEDICINE

## 2018-12-03 PROCEDURE — 1210000000 HC MED SURG R&B

## 2018-12-03 PROCEDURE — 6370000000 HC RX 637 (ALT 250 FOR IP): Performed by: INTERNAL MEDICINE

## 2018-12-03 PROCEDURE — 99232 SBSQ HOSP IP/OBS MODERATE 35: CPT | Performed by: INTERNAL MEDICINE

## 2018-12-03 PROCEDURE — 2700000000 HC OXYGEN THERAPY PER DAY

## 2018-12-03 PROCEDURE — 2580000003 HC RX 258: Performed by: INTERNAL MEDICINE

## 2018-12-03 PROCEDURE — 94640 AIRWAY INHALATION TREATMENT: CPT

## 2018-12-03 RX ADMIN — IPRATROPIUM BROMIDE AND ALBUTEROL SULFATE 1 AMPULE: 2.5; .5 SOLUTION RESPIRATORY (INHALATION) at 11:21

## 2018-12-03 RX ADMIN — ROFLUMILAST 500 MCG: 500 TABLET ORAL at 08:48

## 2018-12-03 RX ADMIN — GUAIFENESIN 1200 MG: 600 TABLET, EXTENDED RELEASE ORAL at 08:48

## 2018-12-03 RX ADMIN — METHYLPREDNISOLONE SODIUM SUCCINATE 40 MG: 40 INJECTION, POWDER, FOR SOLUTION INTRAMUSCULAR; INTRAVENOUS at 05:22

## 2018-12-03 RX ADMIN — IPRATROPIUM BROMIDE AND ALBUTEROL SULFATE 1 AMPULE: 2.5; .5 SOLUTION RESPIRATORY (INHALATION) at 06:40

## 2018-12-03 RX ADMIN — VARENICLINE TARTRATE 1 MG: 0.5 TABLET, FILM COATED ORAL at 08:48

## 2018-12-03 RX ADMIN — MIRTAZAPINE 30 MG: 15 TABLET, FILM COATED ORAL at 20:24

## 2018-12-03 RX ADMIN — METHYLPREDNISOLONE SODIUM SUCCINATE 40 MG: 40 INJECTION, POWDER, FOR SOLUTION INTRAMUSCULAR; INTRAVENOUS at 18:16

## 2018-12-03 RX ADMIN — ENOXAPARIN SODIUM 40 MG: 40 INJECTION SUBCUTANEOUS at 08:48

## 2018-12-03 RX ADMIN — LEVOFLOXACIN 500 MG: 5 INJECTION, SOLUTION INTRAVENOUS at 05:22

## 2018-12-03 RX ADMIN — METHYLPREDNISOLONE SODIUM SUCCINATE 40 MG: 40 INJECTION, POWDER, FOR SOLUTION INTRAMUSCULAR; INTRAVENOUS at 12:18

## 2018-12-03 RX ADMIN — IPRATROPIUM BROMIDE AND ALBUTEROL SULFATE 1 AMPULE: 2.5; .5 SOLUTION RESPIRATORY (INHALATION) at 18:33

## 2018-12-03 RX ADMIN — VARENICLINE TARTRATE 1 MG: 0.5 TABLET, FILM COATED ORAL at 18:16

## 2018-12-03 RX ADMIN — GUAIFENESIN 1200 MG: 600 TABLET, EXTENDED RELEASE ORAL at 20:24

## 2018-12-03 RX ADMIN — IPRATROPIUM BROMIDE AND ALBUTEROL SULFATE 1 AMPULE: 2.5; .5 SOLUTION RESPIRATORY (INHALATION) at 14:05

## 2018-12-03 RX ADMIN — Medication 10 ML: at 08:49

## 2018-12-03 ASSESSMENT — ENCOUNTER SYMPTOMS
APNEA: 0
ABDOMINAL PAIN: 0
SHORTNESS OF BREATH: 1
RHINORRHEA: 0
WHEEZING: 1
EYE PAIN: 0
SORE THROAT: 0
DIARRHEA: 0
COUGH: 1
EYE DISCHARGE: 0
CHEST TIGHTNESS: 1
CONSTIPATION: 0
ANAL BLEEDING: 0
EYE ITCHING: 0
ABDOMINAL DISTENTION: 0

## 2018-12-03 NOTE — CARE COORDINATION
[address] Transitions Interview     12/3/2018    Patient: Olivia Becerra Patient : 1960   MRN: [id number]  Reason for Admission: There are no discharge diagnoses documented for the most recent discharge. RARS: Readmission Risk Score: 13       Spoke with: Olivia Becerra        Readmission Risk  Patient Active Problem List   Diagnosis    Family history of colon cancer    Family history of colonic polyps    Benign colon polyp    S/P colonoscopic polypectomy    Diverticulosis of colon    Loose stools    Abdominal cramping    History of colon polyps    Abnormal CT of the abdomen    Panlobular emphysema ([address]    Hyperlipidemia    Hypertension    Nicotine dependence, cigarettes, uncomplicated    Acute exacerbation of chronic obstructive airways disease [facility]    COPD (chronic obstructive pulmonary disease) ([facility]    Pneumonia of right lower lobe due to Pseudomonas species (HCC)    Pneumonia    Abnormal weight loss    Severe malnutrition (HCC)    COPD exacerbation ([facility]    Palliative care patient    Chronic respiratory failure with hypoxia [facility])       Inpatient Assessment  Care Transitions Summary    Care Transitions Inpatient Review  Medication Review  Are you able to afford your medications?:  Yes  How often do you have difficulty taking your medications?:  I always take them as prescribed. Housing Review  Who do you live with?:  Alone  Are you an active caregiver in your home?:  No  Social Support  Do you have a ?:  No  Do you have a [address] [place] [address]?:  No  Durable Medical Equipment  Patient Home Equipment:  Oxygen  Functional Review  Ability to seek help/take action for Emergent/Urgent situations i.e. fire, crime, inclement weather or health crisis. :  Independent  Ability handle personal hygiene needs (bathing/dressing/grooming): Independent  Ability to manage medications:   Independent  Ability to prepare food:  Independent  Ability to maintain home

## 2018-12-04 ENCOUNTER — OUTSIDE FACILITY SERVICE (OUTPATIENT)
Dept: PULMONOLOGY | Facility: CLINIC | Age: 58
End: 2018-12-04

## 2018-12-04 ENCOUNTER — APPOINTMENT (OUTPATIENT)
Dept: GENERAL RADIOLOGY | Age: 58
DRG: 190 | End: 2018-12-04
Payer: MEDICARE

## 2018-12-04 LAB
ANION GAP SERPL CALCULATED.3IONS-SCNC: 6 MMOL/L (ref 7–19)
BUN BLDV-MCNC: 14 MG/DL (ref 8–23)
CALCIUM SERPL-MCNC: 9 MG/DL (ref 8.8–10.2)
CHLORIDE BLD-SCNC: 102 MMOL/L (ref 98–111)
CO2: 29 MMOL/L (ref 22–29)
CREAT SERPL-MCNC: 0.6 MG/DL (ref 0.5–1.2)
EKG P AXIS: 81 DEGREES
EKG P-R INTERVAL: 146 MS
EKG Q-T INTERVAL: 374 MS
EKG QRS DURATION: 76 MS
EKG QTC CALCULATION (BAZETT): 423 MS
EKG T AXIS: 81 DEGREES
GFR NON-AFRICAN AMERICAN: >60
GLUCOSE BLD-MCNC: 144 MG/DL (ref 74–109)
HCT VFR BLD CALC: 35.6 % (ref 42–52)
HEMOGLOBIN: 11.2 G/DL (ref 14–18)
MCH RBC QN AUTO: 28.2 PG (ref 27–31)
MCHC RBC AUTO-ENTMCNC: 31.5 G/DL (ref 33–37)
MCV RBC AUTO: 89.7 FL (ref 80–94)
PDW BLD-RTO: 14.6 % (ref 11.5–14.5)
PLATELET # BLD: 308 K/UL (ref 130–400)
PMV BLD AUTO: 10 FL (ref 9.4–12.4)
POTASSIUM SERPL-SCNC: 4.7 MMOL/L (ref 3.5–5)
PRO-BNP: 225 PG/ML (ref 0–900)
RBC # BLD: 3.97 M/UL (ref 4.7–6.1)
SODIUM BLD-SCNC: 137 MMOL/L (ref 136–145)
WBC # BLD: 17.8 K/UL (ref 4.8–10.8)

## 2018-12-04 PROCEDURE — 80048 BASIC METABOLIC PNL TOTAL CA: CPT

## 2018-12-04 PROCEDURE — 71046 X-RAY EXAM CHEST 2 VIEWS: CPT

## 2018-12-04 PROCEDURE — 6360000002 HC RX W HCPCS: Performed by: INTERNAL MEDICINE

## 2018-12-04 PROCEDURE — 85027 COMPLETE CBC AUTOMATED: CPT

## 2018-12-04 PROCEDURE — 1210000000 HC MED SURG R&B

## 2018-12-04 PROCEDURE — 94640 AIRWAY INHALATION TREATMENT: CPT

## 2018-12-04 PROCEDURE — 6370000000 HC RX 637 (ALT 250 FOR IP): Performed by: INTERNAL MEDICINE

## 2018-12-04 PROCEDURE — 2580000003 HC RX 258: Performed by: INTERNAL MEDICINE

## 2018-12-04 PROCEDURE — 2700000000 HC OXYGEN THERAPY PER DAY

## 2018-12-04 PROCEDURE — 6370000000 HC RX 637 (ALT 250 FOR IP): Performed by: FAMILY MEDICINE

## 2018-12-04 PROCEDURE — 6370000000 HC RX 637 (ALT 250 FOR IP): Performed by: NURSE PRACTITIONER

## 2018-12-04 PROCEDURE — 83880 ASSAY OF NATRIURETIC PEPTIDE: CPT

## 2018-12-04 PROCEDURE — 99232 SBSQ HOSP IP/OBS MODERATE 35: CPT | Performed by: INTERNAL MEDICINE

## 2018-12-04 PROCEDURE — 36415 COLL VENOUS BLD VENIPUNCTURE: CPT

## 2018-12-04 RX ORDER — PREDNISONE 20 MG/1
20 TABLET ORAL 2 TIMES DAILY
Status: DISCONTINUED | OUTPATIENT
Start: 2018-12-04 | End: 2018-12-05 | Stop reason: HOSPADM

## 2018-12-04 RX ORDER — LEVOFLOXACIN 500 MG/1
500 TABLET, FILM COATED ORAL DAILY
Status: DISCONTINUED | OUTPATIENT
Start: 2018-12-05 | End: 2018-12-05 | Stop reason: HOSPADM

## 2018-12-04 RX ADMIN — VARENICLINE TARTRATE 1 MG: 0.5 TABLET, FILM COATED ORAL at 08:40

## 2018-12-04 RX ADMIN — IPRATROPIUM BROMIDE AND ALBUTEROL SULFATE 1 AMPULE: 2.5; .5 SOLUTION RESPIRATORY (INHALATION) at 14:23

## 2018-12-04 RX ADMIN — GUAIFENESIN 1200 MG: 600 TABLET, EXTENDED RELEASE ORAL at 20:35

## 2018-12-04 RX ADMIN — MIRTAZAPINE 30 MG: 15 TABLET, FILM COATED ORAL at 20:35

## 2018-12-04 RX ADMIN — IPRATROPIUM BROMIDE AND ALBUTEROL SULFATE 1 AMPULE: 2.5; .5 SOLUTION RESPIRATORY (INHALATION) at 18:40

## 2018-12-04 RX ADMIN — IPRATROPIUM BROMIDE AND ALBUTEROL SULFATE 1 AMPULE: 2.5; .5 SOLUTION RESPIRATORY (INHALATION) at 10:10

## 2018-12-04 RX ADMIN — SALINE NASAL SPRAY 1 SPRAY: 1.5 SOLUTION NASAL at 13:45

## 2018-12-04 RX ADMIN — MAGNESIUM HYDROXIDE 30 ML: 400 SUSPENSION ORAL at 13:45

## 2018-12-04 RX ADMIN — ONDANSETRON 4 MG: 2 INJECTION INTRAMUSCULAR; INTRAVENOUS at 00:04

## 2018-12-04 RX ADMIN — PREDNISONE 20 MG: 20 TABLET ORAL at 11:03

## 2018-12-04 RX ADMIN — PREDNISONE 20 MG: 20 TABLET ORAL at 20:35

## 2018-12-04 RX ADMIN — ROFLUMILAST 500 MCG: 500 TABLET ORAL at 08:40

## 2018-12-04 RX ADMIN — IPRATROPIUM BROMIDE AND ALBUTEROL SULFATE 1 AMPULE: 2.5; .5 SOLUTION RESPIRATORY (INHALATION) at 06:47

## 2018-12-04 RX ADMIN — VARENICLINE TARTRATE 1 MG: 0.5 TABLET, FILM COATED ORAL at 17:45

## 2018-12-04 RX ADMIN — ENOXAPARIN SODIUM 40 MG: 40 INJECTION SUBCUTANEOUS at 08:40

## 2018-12-04 RX ADMIN — METHYLPREDNISOLONE SODIUM SUCCINATE 40 MG: 40 INJECTION, POWDER, FOR SOLUTION INTRAMUSCULAR; INTRAVENOUS at 03:53

## 2018-12-04 RX ADMIN — Medication 10 ML: at 08:41

## 2018-12-04 RX ADMIN — GUAIFENESIN 1200 MG: 600 TABLET, EXTENDED RELEASE ORAL at 08:40

## 2018-12-04 RX ADMIN — LEVOFLOXACIN 500 MG: 5 INJECTION, SOLUTION INTRAVENOUS at 03:53

## 2018-12-04 ASSESSMENT — PAIN SCALES - GENERAL: PAINLEVEL_OUTOF10: 0

## 2018-12-05 ENCOUNTER — OUTSIDE FACILITY SERVICE (OUTPATIENT)
Dept: PULMONOLOGY | Facility: CLINIC | Age: 58
End: 2018-12-05

## 2018-12-05 VITALS
DIASTOLIC BLOOD PRESSURE: 62 MMHG | RESPIRATION RATE: 20 BRPM | TEMPERATURE: 97.8 F | BODY MASS INDEX: 18.64 KG/M2 | WEIGHT: 116 LBS | HEIGHT: 66 IN | OXYGEN SATURATION: 95 % | SYSTOLIC BLOOD PRESSURE: 103 MMHG | HEART RATE: 100 BPM

## 2018-12-05 PROCEDURE — 6370000000 HC RX 637 (ALT 250 FOR IP): Performed by: INTERNAL MEDICINE

## 2018-12-05 PROCEDURE — 94640 AIRWAY INHALATION TREATMENT: CPT

## 2018-12-05 PROCEDURE — 6360000002 HC RX W HCPCS: Performed by: INTERNAL MEDICINE

## 2018-12-05 PROCEDURE — 2700000000 HC OXYGEN THERAPY PER DAY

## 2018-12-05 PROCEDURE — 99232 SBSQ HOSP IP/OBS MODERATE 35: CPT | Performed by: INTERNAL MEDICINE

## 2018-12-05 PROCEDURE — 6370000000 HC RX 637 (ALT 250 FOR IP): Performed by: NURSE PRACTITIONER

## 2018-12-05 PROCEDURE — 2580000003 HC RX 258: Performed by: INTERNAL MEDICINE

## 2018-12-05 RX ORDER — LEVOFLOXACIN 500 MG/1
500 TABLET, FILM COATED ORAL DAILY
Qty: 10 TABLET | Refills: 0 | Status: SHIPPED | OUTPATIENT
Start: 2018-12-06 | End: 2018-12-16

## 2018-12-05 RX ORDER — ALBUTEROL SULFATE 90 UG/1
2 AEROSOL, METERED RESPIRATORY (INHALATION) EVERY 6 HOURS PRN
Qty: 1 INHALER | Refills: 3 | Status: SHIPPED | OUTPATIENT
Start: 2018-12-05

## 2018-12-05 RX ADMIN — ROFLUMILAST 500 MCG: 500 TABLET ORAL at 10:16

## 2018-12-05 RX ADMIN — PREDNISONE 20 MG: 20 TABLET ORAL at 10:16

## 2018-12-05 RX ADMIN — IPRATROPIUM BROMIDE AND ALBUTEROL SULFATE 1 AMPULE: 2.5; .5 SOLUTION RESPIRATORY (INHALATION) at 14:18

## 2018-12-05 RX ADMIN — IPRATROPIUM BROMIDE AND ALBUTEROL SULFATE 1 AMPULE: 2.5; .5 SOLUTION RESPIRATORY (INHALATION) at 06:40

## 2018-12-05 RX ADMIN — Medication 10 ML: at 10:17

## 2018-12-05 RX ADMIN — VARENICLINE TARTRATE 1 MG: 0.5 TABLET, FILM COATED ORAL at 09:24

## 2018-12-05 RX ADMIN — LEVOFLOXACIN 500 MG: 500 TABLET, FILM COATED ORAL at 10:16

## 2018-12-05 RX ADMIN — ENOXAPARIN SODIUM 40 MG: 40 INJECTION SUBCUTANEOUS at 09:24

## 2018-12-05 RX ADMIN — MAGNESIUM HYDROXIDE 30 ML: 400 SUSPENSION ORAL at 07:02

## 2018-12-05 RX ADMIN — IPRATROPIUM BROMIDE AND ALBUTEROL SULFATE 1 AMPULE: 2.5; .5 SOLUTION RESPIRATORY (INHALATION) at 10:31

## 2018-12-05 RX ADMIN — GUAIFENESIN 1200 MG: 600 TABLET, EXTENDED RELEASE ORAL at 09:24

## 2018-12-05 NOTE — PROGRESS NOTES
discharge. Left eye exhibits no discharge. Neck: Normal range of motion. Neck supple. No JVD present. Cardiovascular: Normal rate and regular rhythm. Pulmonary/Chest: Effort normal. He has decreased breath sounds. Abdominal: Soft. Bowel sounds are normal. He exhibits no distension. Musculoskeletal: Normal range of motion. He exhibits no edema or deformity. Neurological: He is alert and oriented to person, place, and time. He displays normal reflexes. No cranial nerve deficit. Coordination normal.   Skin: Skin is warm and dry. No rash noted. He is not diaphoretic. No erythema. Psychiatric: He has a normal mood and affect. His behavior is normal. Thought content normal.   Nursing note and vitals reviewed. Recent Labs      12/04/18   0351   WBC  17.8*   RBC  3.97*   HGB  11.2*   HCT  35.6*   PLT  308   MCV  89.7   MCH  28.2   MCHC  31.5*   RDW  14.6*      Recent Labs      12/04/18   0351   NA  137   K  4.7   CL  102   CO2  29   BUN  14   CREATININE  0.6   CALCIUM  9.0   GLUCOSE  144*      Radiograph:   Exam:   XR CHEST (2 VW)     Date:  12/4/2018    History:  Male, age  59 years; respiratory failure   COMPARISON:  Chest x-ray dated 12/1/2018   Findings : The heart and mediastinum are normal in size. Emphysema. Right upper   lobe opacities are slightly decreased. The left lung is similar. No   measurable pneumothorax.  The bones show no acute pathology.         Impression   Impression:   . Slight decrease in the right upper lobe opacities.    Signed by Dr Mora Villagran on 12/4/2018 10:09 AM       My radiograph interpretation: Improvement and nodular right upper lobe infiltrate, nearly resolved    Pulmonary Assessment:    Acute on chronic hypoxemic respiratory failure  Malnutrition  Pulmonary cachexia   COPD   Nicotine dependency   Elevated carboxy level, severe  Panlobular emphysema  Nodular infiltrate right upper lobe, only follow-up    Recommend:     · Home from a pulmonary standpoint when okay with attending  · Continue home bronchodilators  · Follow-up chest x-ray already showing improvement. Should not need any additional follow-up if this nodular area in the right upper lobe  · Smoking cessation  · Carbon monoxide detector at home  · Taper off of prednisone  · Complete full 7 day course of oral antibiotics  · Will need a rescue inhaler prescription at discharge  · Signing off. Keep scheduled follow-up with Lopez Madden in the office in January    Electronically signed by Mercy Anderson on 12/5/2018 at 7:34 AM    He is feeling better. He was ambulating in the burrell at the time that I saw him, with a portable oxygen tank. He still does not feel well but is improved. Lungs have diminished wheezing. He has a scaphoid abdomen. He appears cachectic. Plan to continue with his nutrition continue ambulation. Taper steroids. Could go home either later today or in the morning. Recommendations otherwise as above. New cxr is reviewed, improved Call if needed. I have seen and examined patient personally, performing a face-to-face diagnostic evaluation with plan of care reviewed and developed with APRN and nursing staff. I have addended and/or modified the above history of present illness, physical examination, and assessment and plan to reflect my findings and impressions. Essential elements of the care plan were discussed with APRN above. Agree with findings and assessment/plan as documented above. Electronically signed by Nava Masterson on 12/5/2018, 2:56 PM    EMR Dragon/Transcription disclaimer: Much of this encounter note is an electronic transcription/translation of spoken language to printed text.  The electronic translation of spoken language may permit erroneous, or at times, nonsensical words or phrases to be inadvertently transcribed; although I have reviewed the note for such errors, some may still exist.

## 2018-12-05 NOTE — DISCHARGE INSTR - COC
Continuity of Care Form    Patient Name: Mj Lezama   :  1960  MRN:  [id number]    Admit date:  2018  Discharge date:  ***    Code Status Order: Full Code   Advance Directives:   [facility] Documentation     Date/Time Healthcare Directive Type of Healthcare Directive Copy in [address] Agent's Name Healthcare Agent's Phone Number    18 0044  No, patient does not have an advance directive for healthcare treatment -- -- -- -- --    18 0356  No, patient does not have an advance directive for healthcare treatment -- -- -- -- --          Admitting Physician:  Amara Lyons MD  PCP: Amara Lyons MD    Discharging Nurse: [facility] Unit/Room#: 0067/818-58  Discharging Unit Phone Number: ***    Emergency Contact:   Extended Emergency Contact Information  Primary Emergency Contact: Samantha Khurramjacklyn, [address] Phone: [phone]  Relation: Child  Secondary Emergency Contact: Aleta Mccormick, [address] Phone: [phone]  Relation: Other    Past Surgical History:  Past Surgical History:   Procedure Laterality Date    CARPAL TUNNEL RELEASE      COLONOSCOPY  2012    : moderate diverticulosis and small polyp       Immunization History:   Immunization History   Administered Date(s) Administered    Influenza, Quadv, 3 yrs and older, IM, PF (Fluzone 3 yrs and older or Afluria 5 yrs and older) 10/17/2016       Active Problems:  Patient Active Problem List   Diagnosis Code    Family history of colon cancer Z80.0    Family history of colonic polyps Z83.71    Benign colon polyp K63.5    S/P colonoscopic polypectomy Z98.890    Diverticulosis of colon K57.30    Loose stools R19.5    Abdominal cramping R10.9    History of colon polyps Z86.010    Abnormal CT of the abdomen R93.5    Panlobular emphysema ([address] J43.1    Hyperlipidemia SIGNATURE:  {[facility]:[id number]}

## 2018-12-06 ENCOUNTER — CARE COORDINATION (OUTPATIENT)
Dept: CASE MANAGEMENT | Age: 58
End: 2018-12-06

## 2018-12-06 DIAGNOSIS — J96.11 CHRONIC RESPIRATORY FAILURE WITH HYPOXIA (HCC): Primary | Chronic | ICD-10-CM

## 2018-12-06 PROCEDURE — 1111F DSCHRG MED/CURRENT MED MERGE: CPT | Performed by: FAMILY MEDICINE

## 2018-12-06 NOTE — CARE COORDINATION
[facility] Transitions Initial Follow Up Call    Call within 2 business days of discharge: Yes    Patient: Katie Mendoza Patient : 1960   MRN: [id number]  Reason for Admission: There are no discharge diagnoses documented for the most recent discharge. Discharge Date: 18 RARS: Readmission Risk Score: 16     Spoke with: [address]: [place] [address]      Non-face-to-face services provided:  Reviewed encounter information for continuity of care prior to follow up phone call - chart notes, consults, progress notes, test results, med list, appointments, AVS, other information. Care Transitions 24 Hour Call    Schedule Follow Up Appointment with PCP:  Completed  Do you have any ongoing symptoms?:  No  Do you have a copy of your discharge instructions?:  Yes  Do you have all of your prescriptions and are they filled?:  Yes  Have you been contacted by a [address]?:  No  Have you scheduled your follow up appointment?:  Yes  How are you going to get to your appointment?:  Car - drive self  Were you discharged with any Home Care or Post Acute Services:  No  Patient Home Equipment:  Oxygen  Do you have support at home?:  Alone  Do you feel like you have everything you need to keep you well at home?:  Yes  Are you an active caregiver in your home?:  No  Care Transitions Interventions         Follow Up: Placed a call to patient for an initial follow up for BPCI-A post discharge. He reported that he is doing well for now. He slept well last night and has been just sitting around this morning not doing anything major. He said he has been able to get something to eat and other needed tasks. He is having some ongoing shortness of breath, but feels that he is actually better than he was prior to coming into the hospital.  He is using his oxygen. He reported that he is trying to not smoke. He is using chewing tobacco instead.   Did discuss risks associated

## 2019-01-01 ENCOUNTER — LAB REQUISITION (OUTPATIENT)
Dept: LAB | Facility: HOSPITAL | Age: 59
End: 2019-01-01

## 2019-01-01 ENCOUNTER — OUTSIDE FACILITY SERVICE (OUTPATIENT)
Dept: PULMONOLOGY | Facility: CLINIC | Age: 59
End: 2019-01-01

## 2019-01-01 ENCOUNTER — CARE COORDINATION (OUTPATIENT)
Dept: CARE COORDINATION | Age: 59
End: 2019-01-01

## 2019-01-01 ENCOUNTER — HOSPITAL ENCOUNTER (OUTPATIENT)
Dept: GENERAL RADIOLOGY | Age: 59
Discharge: HOME | End: 2019-09-13
Payer: MEDICARE

## 2019-01-01 ENCOUNTER — TELEPHONE (OUTPATIENT)
Dept: PULMONOLOGY | Facility: CLINIC | Age: 59
End: 2019-01-01

## 2019-01-01 ENCOUNTER — TELEPHONE (OUTPATIENT)
Dept: GASTROENTEROLOGY | Age: 59
End: 2019-01-01

## 2019-01-01 ENCOUNTER — APPOINTMENT (OUTPATIENT)
Dept: GENERAL RADIOLOGY | Age: 59
DRG: 193 | End: 2019-01-01
Payer: MEDICARE

## 2019-01-01 ENCOUNTER — OFFICE VISIT (OUTPATIENT)
Dept: PULMONOLOGY | Facility: CLINIC | Age: 59
End: 2019-01-01

## 2019-01-01 ENCOUNTER — RESULTS ENCOUNTER (OUTPATIENT)
Dept: PULMONOLOGY | Facility: CLINIC | Age: 59
End: 2019-01-01

## 2019-01-01 ENCOUNTER — HOSPITAL ENCOUNTER (INPATIENT)
Age: 59
LOS: 12 days | Discharge: HOME HEALTH | DRG: 193 | End: 2019-12-20
Attending: FAMILY MEDICINE | Admitting: FAMILY MEDICINE
Payer: MEDICARE

## 2019-01-01 ENCOUNTER — HOSPITAL ENCOUNTER (OUTPATIENT)
Dept: GENERAL RADIOLOGY | Age: 59
Discharge: HOME | End: 2019-07-11
Payer: MEDICARE

## 2019-01-01 ENCOUNTER — CARE COORDINATION (OUTPATIENT)
Dept: CASE MANAGEMENT | Age: 59
End: 2019-01-01

## 2019-01-01 ENCOUNTER — HOSPITAL ENCOUNTER (OUTPATIENT)
Dept: CT IMAGING | Age: 59
Discharge: HOME | End: 2019-09-19
Payer: MEDICARE

## 2019-01-01 ENCOUNTER — OFFICE VISIT (OUTPATIENT)
Dept: GASTROENTEROLOGY | Age: 59
End: 2019-01-01
Payer: MEDICARE

## 2019-01-01 VITALS
HEIGHT: 69 IN | DIASTOLIC BLOOD PRESSURE: 90 MMHG | WEIGHT: 111.2 LBS | SYSTOLIC BLOOD PRESSURE: 160 MMHG | BODY MASS INDEX: 16.47 KG/M2 | HEART RATE: 121 BPM | OXYGEN SATURATION: 94 %

## 2019-01-01 VITALS
HEART RATE: 98 BPM | OXYGEN SATURATION: 99 % | DIASTOLIC BLOOD PRESSURE: 73 MMHG | WEIGHT: 100.25 LBS | HEIGHT: 69 IN | BODY MASS INDEX: 14.85 KG/M2 | TEMPERATURE: 98.4 F | SYSTOLIC BLOOD PRESSURE: 113 MMHG | RESPIRATION RATE: 20 BRPM

## 2019-01-01 VITALS
HEIGHT: 69 IN | SYSTOLIC BLOOD PRESSURE: 110 MMHG | WEIGHT: 105 LBS | OXYGEN SATURATION: 94 % | DIASTOLIC BLOOD PRESSURE: 70 MMHG | HEART RATE: 99 BPM | BODY MASS INDEX: 15.55 KG/M2

## 2019-01-01 VITALS
OXYGEN SATURATION: 93 % | HEART RATE: 112 BPM | HEIGHT: 69 IN | BODY MASS INDEX: 17.33 KG/M2 | SYSTOLIC BLOOD PRESSURE: 130 MMHG | WEIGHT: 117 LBS | DIASTOLIC BLOOD PRESSURE: 60 MMHG

## 2019-01-01 VITALS
DIASTOLIC BLOOD PRESSURE: 80 MMHG | BODY MASS INDEX: 15.85 KG/M2 | HEART RATE: 119 BPM | OXYGEN SATURATION: 90 % | SYSTOLIC BLOOD PRESSURE: 120 MMHG | HEIGHT: 69 IN | WEIGHT: 107 LBS

## 2019-01-01 DIAGNOSIS — R91.8 LUNG NODULES: ICD-10-CM

## 2019-01-01 DIAGNOSIS — J43.2 CENTRILOBULAR EMPHYSEMA (HCC): ICD-10-CM

## 2019-01-01 DIAGNOSIS — J47.9 BRONCHIECTASIS WITHOUT COMPLICATION (HCC): ICD-10-CM

## 2019-01-01 DIAGNOSIS — J98.4 CAVITARY LUNG DISEASE: Primary | ICD-10-CM

## 2019-01-01 DIAGNOSIS — J44.9 STAGE 3 SEVERE COPD BY GOLD CLASSIFICATION (HCC): Primary | ICD-10-CM

## 2019-01-01 DIAGNOSIS — R22.2 CHEST MASS: ICD-10-CM

## 2019-01-01 DIAGNOSIS — Z00.00 ENCOUNTER FOR GENERAL ADULT MEDICAL EXAMINATION WITHOUT ABNORMAL FINDINGS: ICD-10-CM

## 2019-01-01 DIAGNOSIS — J98.4 CAVITARY LUNG DISEASE: ICD-10-CM

## 2019-01-01 DIAGNOSIS — J44.9 STAGE 3 SEVERE COPD BY GOLD CLASSIFICATION (HCC): ICD-10-CM

## 2019-01-01 DIAGNOSIS — J44.1 COPD WITH ACUTE EXACERBATION (HCC): ICD-10-CM

## 2019-01-01 DIAGNOSIS — J96.11 CHRONIC RESPIRATORY FAILURE WITH HYPOXIA (HCC): ICD-10-CM

## 2019-01-01 DIAGNOSIS — Z87.891 FORMER SMOKER: ICD-10-CM

## 2019-01-01 DIAGNOSIS — R63.6 UNDERWEIGHT: ICD-10-CM

## 2019-01-01 DIAGNOSIS — E43 SEVERE MALNUTRITION (HCC): Chronic | ICD-10-CM

## 2019-01-01 DIAGNOSIS — Z72.0 SMOKELESS TOBACCO USE: ICD-10-CM

## 2019-01-01 DIAGNOSIS — Z87.891 PERSONAL HISTORY OF TOBACCO USE, PRESENTING HAZARDS TO HEALTH: ICD-10-CM

## 2019-01-01 DIAGNOSIS — R07.89 CHEST WALL PAIN: ICD-10-CM

## 2019-01-01 DIAGNOSIS — B44.9 ASPERGILLUS (HCC): Primary | ICD-10-CM

## 2019-01-01 DIAGNOSIS — Z80.0 FAMILY HISTORY OF COLON CANCER: ICD-10-CM

## 2019-01-01 DIAGNOSIS — R09.02 HYPOXIA: ICD-10-CM

## 2019-01-01 DIAGNOSIS — J18.9 PNEUMONIA OF LEFT LOWER LOBE DUE TO INFECTIOUS ORGANISM: Primary | ICD-10-CM

## 2019-01-01 DIAGNOSIS — J15.1 PNEUMONIA OF RIGHT LOWER LOBE DUE TO PSEUDOMONAS SPECIES (HCC): Primary | ICD-10-CM

## 2019-01-01 DIAGNOSIS — E43 SEVERE MALNUTRITION (HCC): Primary | Chronic | ICD-10-CM

## 2019-01-01 DIAGNOSIS — R63.4 UNEXPLAINED WEIGHT LOSS: ICD-10-CM

## 2019-01-01 DIAGNOSIS — R19.5 HEME POSITIVE STOOL: Primary | ICD-10-CM

## 2019-01-01 DIAGNOSIS — Z99.81 OXYGEN DEPENDENT: ICD-10-CM

## 2019-01-01 DIAGNOSIS — R63.4 LOSS OF WEIGHT: ICD-10-CM

## 2019-01-01 DIAGNOSIS — R93.3 ABNORMAL ESOPHAGRAM: Primary | ICD-10-CM

## 2019-01-01 DIAGNOSIS — R93.3 ABNORMAL ESOPHAGRAM: ICD-10-CM

## 2019-01-01 DIAGNOSIS — J44.9 CHRONIC OBSTRUCTIVE PULMONARY DISEASE, UNSPECIFIED COPD TYPE (HCC): Primary | ICD-10-CM

## 2019-01-01 LAB
ALBUMIN SERPL-MCNC: 2.4 G/DL (ref 3.5–5.2)
ALBUMIN SERPL-MCNC: 3.3 G/DL (ref 3.5–5.2)
ALBUMIN SERPL-MCNC: 3.4 G/DL (ref 3.5–5.2)
ALP BLD-CCNC: 68 U/L (ref 40–130)
ALP BLD-CCNC: 73 U/L (ref 40–130)
ALP BLD-CCNC: 96 U/L (ref 40–130)
ALT SERPL-CCNC: 18 U/L (ref 5–41)
ALT SERPL-CCNC: 8 U/L (ref 5–41)
ALT SERPL-CCNC: 8 U/L (ref 5–41)
ANION GAP SERPL CALCULATED.3IONS-SCNC: 13 MMOL/L (ref 7–19)
ANION GAP SERPL CALCULATED.3IONS-SCNC: 13 MMOL/L (ref 7–19)
ANION GAP SERPL CALCULATED.3IONS-SCNC: 14 MMOL/L (ref 7–19)
ANION GAP SERPL CALCULATED.3IONS-SCNC: 15 MMOL/L (ref 7–19)
APTT: 51.4 SEC (ref 26–36.2)
AST SERPL-CCNC: 12 U/L (ref 5–40)
AST SERPL-CCNC: 14 U/L (ref 5–40)
AST SERPL-CCNC: 18 U/L (ref 5–40)
BACTERIA SPEC RESP CULT: NORMAL
BASE EXCESS ARTERIAL: 4.1 MMOL/L (ref -2–2)
BASE EXCESS ARTERIAL: 6.7 MMOL/L (ref -2–2)
BASOPHILS ABSOLUTE: 0 K/UL (ref 0–0.2)
BASOPHILS ABSOLUTE: 0.1 K/UL (ref 0–0.2)
BASOPHILS ABSOLUTE: 0.1 K/UL (ref 0–0.2)
BASOPHILS RELATIVE PERCENT: 0 % (ref 0–1)
BASOPHILS RELATIVE PERCENT: 0.1 % (ref 0–1)
BASOPHILS RELATIVE PERCENT: 0.2 % (ref 0–1)
BASOPHILS RELATIVE PERCENT: 0.3 % (ref 0–1)
BASOPHILS RELATIVE PERCENT: 0.6 % (ref 0–1)
BILIRUB SERPL-MCNC: 0.3 MG/DL (ref 0.2–1.2)
BILIRUB SERPL-MCNC: 0.4 MG/DL (ref 0.2–1.2)
BILIRUB SERPL-MCNC: <0.2 MG/DL (ref 0.2–1.2)
BLOOD CULTURE, ROUTINE: NORMAL
BUN BLDV-MCNC: 10 MG/DL (ref 8–23)
BUN BLDV-MCNC: 11 MG/DL (ref 8–23)
BUN BLDV-MCNC: 12 MG/DL (ref 8–23)
BUN BLDV-MCNC: 6 MG/DL (ref 8–23)
BUN BLDV-MCNC: 7 MG/DL (ref 8–23)
BUN BLDV-MCNC: 7 MG/DL (ref 8–23)
BUN BLDV-MCNC: 8 MG/DL (ref 8–23)
BUN BLDV-MCNC: 9 MG/DL (ref 8–23)
BUN BLDV-MCNC: 9 MG/DL (ref 8–23)
C DIFF TOXIN/ANTIGEN: NORMAL
CALCIUM SERPL-MCNC: 8.9 MG/DL (ref 8.8–10.2)
CALCIUM SERPL-MCNC: 8.9 MG/DL (ref 8.8–10.2)
CALCIUM SERPL-MCNC: 9 MG/DL (ref 8.8–10.2)
CALCIUM SERPL-MCNC: 9 MG/DL (ref 8.8–10.2)
CALCIUM SERPL-MCNC: 9.2 MG/DL (ref 8.8–10.2)
CALCIUM SERPL-MCNC: 9.5 MG/DL (ref 8.8–10.2)
CALCIUM SERPL-MCNC: 9.5 MG/DL (ref 8.8–10.2)
CALCIUM SERPL-MCNC: 9.7 MG/DL (ref 8.8–10.2)
CALCIUM SERPL-MCNC: 9.7 MG/DL (ref 8.8–10.2)
CARBOXYHEMOGLOBIN ARTERIAL: 2.3 % (ref 0–5)
CARBOXYHEMOGLOBIN ARTERIAL: 2.5 % (ref 0–5)
CHLORIDE BLD-SCNC: 90 MMOL/L (ref 98–111)
CHLORIDE BLD-SCNC: 93 MMOL/L (ref 98–111)
CHLORIDE BLD-SCNC: 93 MMOL/L (ref 98–111)
CHLORIDE BLD-SCNC: 94 MMOL/L (ref 98–111)
CHLORIDE BLD-SCNC: 94 MMOL/L (ref 98–111)
CHLORIDE BLD-SCNC: 95 MMOL/L (ref 98–111)
CHLORIDE BLD-SCNC: 95 MMOL/L (ref 98–111)
CHLORIDE BLD-SCNC: 97 MMOL/L (ref 98–111)
CHLORIDE BLD-SCNC: 98 MMOL/L (ref 98–111)
CO2: 22 MMOL/L (ref 22–29)
CO2: 22 MMOL/L (ref 22–29)
CO2: 23 MMOL/L (ref 22–29)
CO2: 24 MMOL/L (ref 22–29)
CO2: 26 MMOL/L (ref 22–29)
CO2: 26 MMOL/L (ref 22–29)
CO2: 27 MMOL/L (ref 22–29)
CO2: 27 MMOL/L (ref 22–29)
CO2: 30 MMOL/L (ref 22–29)
CREAT SERPL-MCNC: 0.5 MG/DL (ref 0.5–1.2)
CREAT SERPL-MCNC: 0.5 MG/DL (ref 0.5–1.2)
CREAT SERPL-MCNC: <0.5 MG/DL (ref 0.5–1.2)
CULTURE, BLOOD 2: NORMAL
CULTURE, RESPIRATORY: NORMAL
EKG P AXIS: 80 DEGREES
EKG P AXIS: 80 DEGREES
EKG P AXIS: 81 DEGREES
EKG P AXIS: 84 DEGREES
EKG P AXIS: 89 DEGREES
EKG P AXIS: NORMAL DEGREES
EKG P-R INTERVAL: 130 MS
EKG P-R INTERVAL: 136 MS
EKG P-R INTERVAL: 140 MS
EKG P-R INTERVAL: NORMAL MS
EKG Q-T INTERVAL: 236 MS
EKG Q-T INTERVAL: 328 MS
EKG Q-T INTERVAL: 332 MS
EKG Q-T INTERVAL: 342 MS
EKG Q-T INTERVAL: 350 MS
EKG Q-T INTERVAL: 354 MS
EKG QRS DURATION: 70 MS
EKG QRS DURATION: 74 MS
EKG QRS DURATION: 76 MS
EKG QRS DURATION: 80 MS
EKG QTC CALCULATION (BAZETT): 407 MS
EKG QTC CALCULATION (BAZETT): 412 MS
EKG QTC CALCULATION (BAZETT): 412 MS
EKG QTC CALCULATION (BAZETT): 419 MS
EKG QTC CALCULATION (BAZETT): 424 MS
EKG QTC CALCULATION (BAZETT): 454 MS
EKG T AXIS: -22 DEGREES
EKG T AXIS: 80 DEGREES
EKG T AXIS: 81 DEGREES
EKG T AXIS: 81 DEGREES
EKG T AXIS: 82 DEGREES
EKG T AXIS: 83 DEGREES
EOSINOPHILS ABSOLUTE: 0 K/UL (ref 0–0.6)
EOSINOPHILS ABSOLUTE: 0.1 K/UL (ref 0–0.6)
EOSINOPHILS ABSOLUTE: 0.25 K/UL (ref 0–0.6)
EOSINOPHILS RELATIVE PERCENT: 0 % (ref 0–5)
EOSINOPHILS RELATIVE PERCENT: 0.1 % (ref 0–5)
EOSINOPHILS RELATIVE PERCENT: 0.1 % (ref 0–5)
EOSINOPHILS RELATIVE PERCENT: 1 % (ref 0–5)
EOSINOPHILS RELATIVE PERCENT: 1.1 % (ref 0–5)
FEV1/FVC: 38.97 %
FEV1: NORMAL LITERS
FUNGUS WND CULT: NORMAL
FVC VOL RESPIRATORY: NORMAL LITERS
GFR NON-AFRICAN AMERICAN: >60
GLUCOSE BLD-MCNC: 100 MG/DL (ref 74–109)
GLUCOSE BLD-MCNC: 101 MG/DL (ref 74–109)
GLUCOSE BLD-MCNC: 103 MG/DL (ref 74–109)
GLUCOSE BLD-MCNC: 108 MG/DL (ref 74–109)
GLUCOSE BLD-MCNC: 110 MG/DL (ref 74–109)
GLUCOSE BLD-MCNC: 119 MG/DL (ref 74–109)
GLUCOSE BLD-MCNC: 126 MG/DL (ref 74–109)
GLUCOSE BLD-MCNC: 137 MG/DL (ref 70–99)
GLUCOSE BLD-MCNC: 145 MG/DL (ref 74–109)
GLUCOSE BLD-MCNC: 91 MG/DL (ref 74–109)
GRAM STAIN RESULT: NORMAL
GRAM STN SPEC: NORMAL
HCO3 ARTERIAL: 27.3 MMOL/L (ref 22–26)
HCO3 ARTERIAL: 30.4 MMOL/L (ref 22–26)
HCT VFR BLD CALC: 31.9 % (ref 42–52)
HCT VFR BLD CALC: 34 % (ref 42–52)
HCT VFR BLD CALC: 34.2 % (ref 42–52)
HCT VFR BLD CALC: 35.4 % (ref 42–52)
HCT VFR BLD CALC: 36.5 % (ref 42–52)
HCT VFR BLD CALC: 36.9 % (ref 42–52)
HCT VFR BLD CALC: 37.7 % (ref 42–52)
HCT VFR BLD CALC: 38.5 % (ref 42–52)
HEMOGLOBIN, ART, EXTENDED: 11.2 G/DL (ref 14–18)
HEMOGLOBIN, ART, EXTENDED: 12 G/DL (ref 14–18)
HEMOGLOBIN: 10.2 G/DL (ref 14–18)
HEMOGLOBIN: 10.6 G/DL (ref 14–18)
HEMOGLOBIN: 11.2 G/DL (ref 14–18)
HEMOGLOBIN: 11.3 G/DL (ref 14–18)
HEMOGLOBIN: 11.5 G/DL (ref 14–18)
HEMOGLOBIN: 11.7 G/DL (ref 14–18)
HEMOGLOBIN: 12.4 G/DL (ref 14–18)
HEMOGLOBIN: 9.9 G/DL (ref 14–18)
HISTOPLASMA ANTIGEN URINE INTERP: NOT DETECTED
HISTOPLASMA ANTIGEN URINE: NOT DETECTED NG/ML
IMMATURE GRANULOCYTES #: 0.1 K/UL
IMMATURE GRANULOCYTES #: 0.2 K/UL
IMMATURE GRANULOCYTES #: 0.2 K/UL
INR BLD: 1.49 (ref 0.88–1.18)
KOH PREP NAIL: ABNORMAL
KOH PREP NAIL: NORMAL
KOH PREP NAIL: NORMAL
KOH PREP: NORMAL
LACTIC ACID: 2.7 MMOL/L (ref 0.5–1.9)
LIPASE: 10 U/L (ref 13–60)
LYMPHOCYTES ABSOLUTE: 0.5 K/UL (ref 1.1–4.5)
LYMPHOCYTES ABSOLUTE: 0.8 K/UL (ref 1.1–4.5)
LYMPHOCYTES ABSOLUTE: 0.9 K/UL (ref 1.1–4.5)
LYMPHOCYTES ABSOLUTE: 1.1 K/UL (ref 1.1–4.5)
LYMPHOCYTES ABSOLUTE: 2 K/UL (ref 1.1–4.5)
LYMPHOCYTES RELATIVE PERCENT: 10.5 % (ref 20–40)
LYMPHOCYTES RELATIVE PERCENT: 2.1 % (ref 20–40)
LYMPHOCYTES RELATIVE PERCENT: 5 % (ref 20–40)
LYMPHOCYTES RELATIVE PERCENT: 5.4 % (ref 20–40)
LYMPHOCYTES RELATIVE PERCENT: 5.8 % (ref 20–40)
MAGNESIUM: 2.3 MG/DL (ref 1.6–2.4)
MCH RBC QN AUTO: 27.7 PG (ref 27–31)
MCH RBC QN AUTO: 27.7 PG (ref 27–31)
MCH RBC QN AUTO: 27.8 PG (ref 27–31)
MCH RBC QN AUTO: 27.9 PG (ref 27–31)
MCH RBC QN AUTO: 27.9 PG (ref 27–31)
MCH RBC QN AUTO: 28 PG (ref 27–31)
MCH RBC QN AUTO: 28.4 PG (ref 27–31)
MCH RBC QN AUTO: 28.5 PG (ref 27–31)
MCHC RBC AUTO-ENTMCNC: 30 G/DL (ref 33–37)
MCHC RBC AUTO-ENTMCNC: 31 G/DL (ref 33–37)
MCHC RBC AUTO-ENTMCNC: 31.2 G/DL (ref 33–37)
MCHC RBC AUTO-ENTMCNC: 31.6 G/DL (ref 33–37)
MCHC RBC AUTO-ENTMCNC: 32.2 G/DL (ref 33–37)
MCV RBC AUTO: 88.3 FL (ref 80–94)
MCV RBC AUTO: 88.9 FL (ref 80–94)
MCV RBC AUTO: 89.8 FL (ref 80–94)
MCV RBC AUTO: 89.8 FL (ref 80–94)
MCV RBC AUTO: 89.9 FL (ref 80–94)
MCV RBC AUTO: 90.1 FL (ref 80–94)
MCV RBC AUTO: 90.3 FL (ref 80–94)
MCV RBC AUTO: 92.4 FL (ref 80–94)
METHEMOGLOBIN ARTERIAL: 1.1 %
METHEMOGLOBIN ARTERIAL: 1.3 %
MISCELLANEOUS LAB TEST ORDER: NORMAL
MISCELLANEOUS LAB TEST RESULT: ABNORMAL
MONOCYTES ABSOLUTE: 0.8 K/UL (ref 0–0.9)
MONOCYTES ABSOLUTE: 0.9 K/UL (ref 0–0.9)
MONOCYTES ABSOLUTE: 1.3 K/UL (ref 0–0.9)
MONOCYTES ABSOLUTE: 1.5 K/UL (ref 0–0.9)
MONOCYTES ABSOLUTE: 1.6 K/UL (ref 0–0.9)
MONOCYTES RELATIVE PERCENT: 10.6 % (ref 0–10)
MONOCYTES RELATIVE PERCENT: 3 % (ref 0–10)
MONOCYTES RELATIVE PERCENT: 5.8 % (ref 0–10)
MONOCYTES RELATIVE PERCENT: 8.3 % (ref 0–10)
MONOCYTES RELATIVE PERCENT: 8.4 % (ref 0–10)
NEUTROPHILS ABSOLUTE: 12.5 K/UL (ref 1.5–7.5)
NEUTROPHILS ABSOLUTE: 12.9 K/UL (ref 1.5–7.5)
NEUTROPHILS ABSOLUTE: 22.2 K/UL (ref 1.5–7.5)
NEUTROPHILS ABSOLUTE: 22.8 K/UL (ref 1.5–7.5)
NEUTROPHILS ABSOLUTE: 8.4 K/UL (ref 1.5–7.5)
NEUTROPHILS RELATIVE PERCENT: 78.9 % (ref 50–65)
NEUTROPHILS RELATIVE PERCENT: 82.9 % (ref 50–65)
NEUTROPHILS RELATIVE PERCENT: 85.3 % (ref 50–65)
NEUTROPHILS RELATIVE PERCENT: 88 % (ref 50–65)
NEUTROPHILS RELATIVE PERCENT: 91.3 % (ref 50–65)
O2 CONTENT ARTERIAL: 14.2 ML/DL
O2 CONTENT ARTERIAL: 15.9 ML/DL
O2 SAT, ARTERIAL: 89.9 %
O2 SAT, ARTERIAL: 94 %
O2 THERAPY: ABNORMAL
O2 THERAPY: ABNORMAL
PCO2 ARTERIAL: 35 MMHG (ref 35–45)
PCO2 ARTERIAL: 39 MMHG (ref 35–45)
PDW BLD-RTO: 13.6 % (ref 11.5–14.5)
PDW BLD-RTO: 13.7 % (ref 11.5–14.5)
PDW BLD-RTO: 13.7 % (ref 11.5–14.5)
PDW BLD-RTO: 13.8 % (ref 11.5–14.5)
PDW BLD-RTO: 13.9 % (ref 11.5–14.5)
PDW BLD-RTO: 14 % (ref 11.5–14.5)
PERFORMED ON: ABNORMAL
PERFORMED ON: NORMAL
PH ARTERIAL: 7.5 (ref 7.35–7.45)
PH ARTERIAL: 7.5 (ref 7.35–7.45)
PLATELET # BLD: 386 K/UL (ref 130–400)
PLATELET # BLD: 391 K/UL (ref 130–400)
PLATELET # BLD: 391 K/UL (ref 130–400)
PLATELET # BLD: 424 K/UL (ref 130–400)
PLATELET # BLD: 434 K/UL (ref 130–400)
PLATELET # BLD: 486 K/UL (ref 130–400)
PLATELET # BLD: 492 K/UL (ref 130–400)
PLATELET # BLD: 535 K/UL (ref 130–400)
PLATELET SLIDE REVIEW: ADEQUATE
PMV BLD AUTO: 10.1 FL (ref 9.4–12.4)
PMV BLD AUTO: 10.1 FL (ref 9.4–12.4)
PMV BLD AUTO: 10.2 FL (ref 9.4–12.4)
PMV BLD AUTO: 10.6 FL (ref 9.4–12.4)
PMV BLD AUTO: 9.5 FL (ref 9.4–12.4)
PMV BLD AUTO: 9.8 FL (ref 9.4–12.4)
PMV BLD AUTO: 9.9 FL (ref 9.4–12.4)
PMV BLD AUTO: 9.9 FL (ref 9.4–12.4)
PO2 ARTERIAL: 54 MMHG (ref 80–100)
PO2 ARTERIAL: 66 MMHG (ref 80–100)
POC CREATININE: 0.8 MG/DL (ref 0.3–1.3)
POC SAMPLE TYPE: NORMAL
POTASSIUM REFLEX MAGNESIUM: 4.1 MMOL/L (ref 3.5–5)
POTASSIUM REFLEX MAGNESIUM: 4.4 MMOL/L (ref 3.5–5)
POTASSIUM SERPL-SCNC: 4 MMOL/L (ref 3.5–5)
POTASSIUM SERPL-SCNC: 4.1 MMOL/L (ref 3.5–5)
POTASSIUM SERPL-SCNC: 4.1 MMOL/L (ref 3.5–5)
POTASSIUM SERPL-SCNC: 4.2 MMOL/L (ref 3.5–5)
POTASSIUM SERPL-SCNC: 4.6 MMOL/L (ref 3.5–5)
POTASSIUM SERPL-SCNC: 5.2 MMOL/L (ref 3.5–5)
POTASSIUM SERPL-SCNC: 5.5 MMOL/L (ref 3.5–5)
POTASSIUM, WHOLE BLOOD: 3.8
POTASSIUM, WHOLE BLOOD: 4
PRO-BNP: 431 PG/ML (ref 0–900)
PROTHROMBIN TIME: 17.3 SEC (ref 12–14.6)
RBC # BLD: 3.55 M/UL (ref 4.7–6.1)
RBC # BLD: 3.68 M/UL (ref 4.7–6.1)
RBC # BLD: 3.81 M/UL (ref 4.7–6.1)
RBC # BLD: 3.93 M/UL (ref 4.7–6.1)
RBC # BLD: 4.04 M/UL (ref 4.7–6.1)
RBC # BLD: 4.15 M/UL (ref 4.7–6.1)
RBC # BLD: 4.2 M/UL (ref 4.7–6.1)
RBC # BLD: 4.36 M/UL (ref 4.7–6.1)
SODIUM BLD-SCNC: 131 MMOL/L (ref 136–145)
SODIUM BLD-SCNC: 131 MMOL/L (ref 136–145)
SODIUM BLD-SCNC: 132 MMOL/L (ref 136–145)
SODIUM BLD-SCNC: 132 MMOL/L (ref 136–145)
SODIUM BLD-SCNC: 133 MMOL/L (ref 136–145)
SODIUM BLD-SCNC: 135 MMOL/L (ref 136–145)
SODIUM BLD-SCNC: 135 MMOL/L (ref 136–145)
SODIUM BLD-SCNC: 136 MMOL/L (ref 136–145)
SODIUM BLD-SCNC: 138 MMOL/L (ref 136–145)
TOTAL PROTEIN: 5.9 G/DL (ref 6.6–8.7)
TOTAL PROTEIN: 5.9 G/DL (ref 6.6–8.7)
TOTAL PROTEIN: 7.2 G/DL (ref 6.6–8.7)
TROPONIN: 0.01 NG/ML (ref 0–0.03)
TROPONIN: 0.03 NG/ML (ref 0–0.03)
WBC # BLD: 10.3 K/UL (ref 4.8–10.8)
WBC # BLD: 10.6 K/UL (ref 4.8–10.8)
WBC # BLD: 13 K/UL (ref 4.8–10.8)
WBC # BLD: 13.4 K/UL (ref 4.8–10.8)
WBC # BLD: 15.1 K/UL (ref 4.8–10.8)
WBC # BLD: 15.2 K/UL (ref 4.8–10.8)
WBC # BLD: 25 K/UL (ref 4.8–10.8)
WBC # BLD: 25.2 K/UL (ref 4.8–10.8)

## 2019-01-01 PROCEDURE — 2580000003 HC RX 258: Performed by: INTERNAL MEDICINE

## 2019-01-01 PROCEDURE — 94640 AIRWAY INHALATION TREATMENT: CPT

## 2019-01-01 PROCEDURE — 6370000000 HC RX 637 (ALT 250 FOR IP): Performed by: INTERNAL MEDICINE

## 2019-01-01 PROCEDURE — 80048 BASIC METABOLIC PNL TOTAL CA: CPT

## 2019-01-01 PROCEDURE — 2700000000 HC OXYGEN THERAPY PER DAY

## 2019-01-01 PROCEDURE — 84132 ASSAY OF SERUM POTASSIUM: CPT

## 2019-01-01 PROCEDURE — 6360000002 HC RX W HCPCS: Performed by: INTERNAL MEDICINE

## 2019-01-01 PROCEDURE — 1210000000 HC MED SURG R&B

## 2019-01-01 PROCEDURE — 93010 ELECTROCARDIOGRAM REPORT: CPT | Performed by: INTERNAL MEDICINE

## 2019-01-01 PROCEDURE — 6360000002 HC RX W HCPCS: Performed by: NURSE PRACTITIONER

## 2019-01-01 PROCEDURE — 87015 SPECIMEN INFECT AGNT CONCNTJ: CPT

## 2019-01-01 PROCEDURE — 6360000002 HC RX W HCPCS: Performed by: FAMILY MEDICINE

## 2019-01-01 PROCEDURE — 97530 THERAPEUTIC ACTIVITIES: CPT

## 2019-01-01 PROCEDURE — 96375 TX/PRO/DX INJ NEW DRUG ADDON: CPT

## 2019-01-01 PROCEDURE — 85025 COMPLETE CBC W/AUTO DIFF WBC: CPT

## 2019-01-01 PROCEDURE — 85027 COMPLETE CBC AUTOMATED: CPT

## 2019-01-01 PROCEDURE — 6370000000 HC RX 637 (ALT 250 FOR IP): Performed by: NURSE PRACTITIONER

## 2019-01-01 PROCEDURE — 71260 CT THORAX DX C+: CPT

## 2019-01-01 PROCEDURE — 87116 MYCOBACTERIA CULTURE: CPT | Performed by: INTERNAL MEDICINE

## 2019-01-01 PROCEDURE — G8427 DOCREV CUR MEDS BY ELIG CLIN: HCPCS | Performed by: NURSE PRACTITIONER

## 2019-01-01 PROCEDURE — 83605 ASSAY OF LACTIC ACID: CPT

## 2019-01-01 PROCEDURE — 99232 SBSQ HOSP IP/OBS MODERATE 35: CPT | Performed by: INTERNAL MEDICINE

## 2019-01-01 PROCEDURE — 82803 BLOOD GASES ANY COMBINATION: CPT

## 2019-01-01 PROCEDURE — 82565 ASSAY OF CREATININE: CPT

## 2019-01-01 PROCEDURE — 94010 BREATHING CAPACITY TEST: CPT | Performed by: NURSE PRACTITIONER

## 2019-01-01 PROCEDURE — 99222 1ST HOSP IP/OBS MODERATE 55: CPT | Performed by: INTERNAL MEDICINE

## 2019-01-01 PROCEDURE — 87070 CULTURE OTHR SPECIMN AEROBIC: CPT | Performed by: INTERNAL MEDICINE

## 2019-01-01 PROCEDURE — 93005 ELECTROCARDIOGRAM TRACING: CPT | Performed by: NURSE PRACTITIONER

## 2019-01-01 PROCEDURE — 6370000000 HC RX 637 (ALT 250 FOR IP): Performed by: FAMILY MEDICINE

## 2019-01-01 PROCEDURE — 87190 SC STD MYOBACTERIA PROP METH: CPT

## 2019-01-01 PROCEDURE — 36415 COLL VENOUS BLD VENIPUNCTURE: CPT

## 2019-01-01 PROCEDURE — 3017F COLORECTAL CA SCREEN DOC REV: CPT | Performed by: NURSE PRACTITIONER

## 2019-01-01 PROCEDURE — 93005 ELECTROCARDIOGRAM TRACING: CPT | Performed by: INTERNAL MEDICINE

## 2019-01-01 PROCEDURE — 87205 SMEAR GRAM STAIN: CPT | Performed by: INTERNAL MEDICINE

## 2019-01-01 PROCEDURE — 97161 PT EVAL LOW COMPLEX 20 MIN: CPT

## 2019-01-01 PROCEDURE — 99214 OFFICE O/P EST MOD 30 MIN: CPT | Performed by: NURSE PRACTITIONER

## 2019-01-01 PROCEDURE — 36600 WITHDRAWAL OF ARTERIAL BLOOD: CPT

## 2019-01-01 PROCEDURE — G0008 ADMIN INFLUENZA VIRUS VAC: HCPCS | Performed by: NURSE PRACTITIONER

## 2019-01-01 PROCEDURE — 87220 TISSUE EXAM FOR FUNGI: CPT | Performed by: INTERNAL MEDICINE

## 2019-01-01 PROCEDURE — 97535 SELF CARE MNGMENT TRAINING: CPT

## 2019-01-01 PROCEDURE — 87040 BLOOD CULTURE FOR BACTERIA: CPT

## 2019-01-01 PROCEDURE — 80053 COMPREHEN METABOLIC PANEL: CPT

## 2019-01-01 PROCEDURE — 1111F DSCHRG MED/CURRENT MED MERGE: CPT | Performed by: FAMILY MEDICINE

## 2019-01-01 PROCEDURE — 82948 REAGENT STRIP/BLOOD GLUCOSE: CPT

## 2019-01-01 PROCEDURE — 90653 IIV ADJUVANT VACCINE IM: CPT | Performed by: NURSE PRACTITIONER

## 2019-01-01 PROCEDURE — 2500000003 HC RX 250 WO HCPCS: Performed by: FAMILY MEDICINE

## 2019-01-01 PROCEDURE — 2580000003 HC RX 258: Performed by: FAMILY MEDICINE

## 2019-01-01 PROCEDURE — 87116 MYCOBACTERIA CULTURE: CPT

## 2019-01-01 PROCEDURE — 87449 NOS EACH ORGANISM AG IA: CPT

## 2019-01-01 PROCEDURE — 87102 FUNGUS ISOLATION CULTURE: CPT | Performed by: INTERNAL MEDICINE

## 2019-01-01 PROCEDURE — 83735 ASSAY OF MAGNESIUM: CPT

## 2019-01-01 PROCEDURE — 85730 THROMBOPLASTIN TIME PARTIAL: CPT

## 2019-01-01 PROCEDURE — G8419 CALC BMI OUT NRM PARAM NOF/U: HCPCS | Performed by: NURSE PRACTITIONER

## 2019-01-01 PROCEDURE — 97110 THERAPEUTIC EXERCISES: CPT

## 2019-01-01 PROCEDURE — 96372 THER/PROPH/DIAG INJ SC/IM: CPT | Performed by: NURSE PRACTITIONER

## 2019-01-01 PROCEDURE — 71045 X-RAY EXAM CHEST 1 VIEW: CPT

## 2019-01-01 PROCEDURE — 87070 CULTURE OTHR SPECIMN AEROBIC: CPT

## 2019-01-01 PROCEDURE — 71046 X-RAY EXAM CHEST 2 VIEWS: CPT

## 2019-01-01 PROCEDURE — 87324 CLOSTRIDIUM AG IA: CPT

## 2019-01-01 PROCEDURE — 87206 SMEAR FLUORESCENT/ACID STAI: CPT

## 2019-01-01 PROCEDURE — 93005 ELECTROCARDIOGRAM TRACING: CPT | Performed by: FAMILY MEDICINE

## 2019-01-01 PROCEDURE — 6360000004 HC RX CONTRAST MEDICATION: Performed by: NURSE PRACTITIONER

## 2019-01-01 PROCEDURE — 87205 SMEAR GRAM STAIN: CPT

## 2019-01-01 PROCEDURE — 4004F PT TOBACCO SCREEN RCVD TLK: CPT | Performed by: NURSE PRACTITIONER

## 2019-01-01 PROCEDURE — 83690 ASSAY OF LIPASE: CPT

## 2019-01-01 PROCEDURE — 6360000002 HC RX W HCPCS

## 2019-01-01 PROCEDURE — 83880 ASSAY OF NATRIURETIC PEPTIDE: CPT

## 2019-01-01 PROCEDURE — 96365 THER/PROPH/DIAG IV INF INIT: CPT

## 2019-01-01 PROCEDURE — 87206 SMEAR FLUORESCENT/ACID STAI: CPT | Performed by: INTERNAL MEDICINE

## 2019-01-01 PROCEDURE — 2580000003 HC RX 258: Performed by: NURSE PRACTITIONER

## 2019-01-01 PROCEDURE — 97165 OT EVAL LOW COMPLEX 30 MIN: CPT

## 2019-01-01 PROCEDURE — 74245 FL UGI W SMALL BOWEL: CPT

## 2019-01-01 PROCEDURE — 84484 ASSAY OF TROPONIN QUANT: CPT

## 2019-01-01 PROCEDURE — 99231 SBSQ HOSP IP/OBS SF/LOW 25: CPT | Performed by: INTERNAL MEDICINE

## 2019-01-01 PROCEDURE — 87077 CULTURE AEROBIC IDENTIFY: CPT

## 2019-01-01 PROCEDURE — 85610 PROTHROMBIN TIME: CPT

## 2019-01-01 PROCEDURE — 99285 EMERGENCY DEPT VISIT HI MDM: CPT

## 2019-01-01 PROCEDURE — 96366 THER/PROPH/DIAG IV INF ADDON: CPT

## 2019-01-01 RX ORDER — ALBUTEROL SULFATE 2.5 MG/3ML
2.5 SOLUTION RESPIRATORY (INHALATION)
Status: DISCONTINUED | OUTPATIENT
Start: 2019-01-01 | End: 2019-01-01

## 2019-01-01 RX ORDER — METHYLPREDNISOLONE ACETATE 80 MG/ML
80 INJECTION, SUSPENSION INTRA-ARTICULAR; INTRALESIONAL; INTRAMUSCULAR; SOFT TISSUE ONCE
Status: COMPLETED | OUTPATIENT
Start: 2019-01-01 | End: 2019-01-01

## 2019-01-01 RX ORDER — SODIUM CHLORIDE 0.9 % (FLUSH) 0.9 %
10 SYRINGE (ML) INJECTION EVERY 12 HOURS SCHEDULED
Status: DISCONTINUED | OUTPATIENT
Start: 2019-01-01 | End: 2019-01-01 | Stop reason: HOSPADM

## 2019-01-01 RX ORDER — SODIUM CHLORIDE 0.9 % (FLUSH) 0.9 %
10 SYRINGE (ML) INJECTION PRN
Status: DISCONTINUED | OUTPATIENT
Start: 2019-01-01 | End: 2019-01-01 | Stop reason: HOSPADM

## 2019-01-01 RX ORDER — IPRATROPIUM BROMIDE AND ALBUTEROL SULFATE 2.5; .5 MG/3ML; MG/3ML
1 SOLUTION RESPIRATORY (INHALATION) 4 TIMES DAILY
Status: DISCONTINUED | OUTPATIENT
Start: 2019-01-01 | End: 2019-01-01 | Stop reason: HOSPADM

## 2019-01-01 RX ORDER — LABETALOL 20 MG/4 ML (5 MG/ML) INTRAVENOUS SYRINGE
10 ONCE
Status: COMPLETED | OUTPATIENT
Start: 2019-01-01 | End: 2019-01-01

## 2019-01-01 RX ORDER — TAMSULOSIN HYDROCHLORIDE 0.4 MG/1
0.4 CAPSULE ORAL DAILY
Status: DISCONTINUED | OUTPATIENT
Start: 2019-01-01 | End: 2019-01-01 | Stop reason: HOSPADM

## 2019-01-01 RX ORDER — SODIUM CHLORIDE 0.9 % (FLUSH) 0.9 %
10 SYRINGE (ML) INJECTION EVERY 12 HOURS SCHEDULED
Status: DISCONTINUED | OUTPATIENT
Start: 2019-01-01 | End: 2019-01-01 | Stop reason: SDUPTHER

## 2019-01-01 RX ORDER — HYDROCODONE BITARTRATE AND ACETAMINOPHEN 5; 325 MG/1; MG/1
1 TABLET ORAL EVERY 4 HOURS PRN
Status: DISCONTINUED | OUTPATIENT
Start: 2019-01-01 | End: 2019-01-01

## 2019-01-01 RX ORDER — BUDESONIDE 0.5 MG/2ML
0.5 INHALANT ORAL 2 TIMES DAILY
Status: DISCONTINUED | OUTPATIENT
Start: 2019-01-01 | End: 2019-01-01 | Stop reason: HOSPADM

## 2019-01-01 RX ORDER — KETOROLAC TROMETHAMINE 30 MG/ML
15 INJECTION, SOLUTION INTRAMUSCULAR; INTRAVENOUS EVERY 6 HOURS PRN
Status: DISPENSED | OUTPATIENT
Start: 2019-01-01 | End: 2019-01-01

## 2019-01-01 RX ORDER — ETHAMBUTOL HYDROCHLORIDE 400 MG/1
800 TABLET, FILM COATED ORAL DAILY
Status: DISCONTINUED | OUTPATIENT
Start: 2019-01-01 | End: 2019-01-01 | Stop reason: HOSPADM

## 2019-01-01 RX ORDER — METHYLPREDNISOLONE SODIUM SUCCINATE 40 MG/ML
40 INJECTION, POWDER, LYOPHILIZED, FOR SOLUTION INTRAMUSCULAR; INTRAVENOUS EVERY 12 HOURS
Status: DISCONTINUED | OUTPATIENT
Start: 2019-01-01 | End: 2019-01-01

## 2019-01-01 RX ORDER — DOXYCYCLINE HYCLATE 100 MG/1
100 CAPSULE ORAL 2 TIMES DAILY
Qty: 20 CAPSULE | Refills: 0 | Status: SHIPPED | OUTPATIENT
Start: 2019-01-01 | End: 2019-01-01 | Stop reason: ALTCHOICE

## 2019-01-01 RX ORDER — ALBUTEROL SULFATE 2.5 MG/3ML
2.5 SOLUTION RESPIRATORY (INHALATION)
Status: DISCONTINUED | OUTPATIENT
Start: 2019-01-01 | End: 2019-01-01 | Stop reason: HOSPADM

## 2019-01-01 RX ORDER — HYDROXYZINE HYDROCHLORIDE 10 MG/1
10 TABLET, FILM COATED ORAL 3 TIMES DAILY PRN
Qty: 30 TABLET | Refills: 0 | Status: SHIPPED | OUTPATIENT
Start: 2019-01-01 | End: 2019-01-01

## 2019-01-01 RX ORDER — PREDNISONE 10 MG/1
20 TABLET ORAL 2 TIMES DAILY
Status: DISCONTINUED | OUTPATIENT
Start: 2019-01-01 | End: 2019-01-01 | Stop reason: HOSPADM

## 2019-01-01 RX ORDER — SODIUM CHLORIDE 0.9 % (FLUSH) 0.9 %
10 SYRINGE (ML) INJECTION PRN
Status: DISCONTINUED | OUTPATIENT
Start: 2019-01-01 | End: 2019-01-01 | Stop reason: SDUPTHER

## 2019-01-01 RX ORDER — LEVOFLOXACIN 500 MG/1
500 TABLET, FILM COATED ORAL DAILY
Qty: 7 TABLET | Refills: 0 | Status: SHIPPED | OUTPATIENT
Start: 2019-01-01 | End: 2019-01-01

## 2019-01-01 RX ORDER — HYDROXYZINE HYDROCHLORIDE 10 MG/1
10 TABLET, FILM COATED ORAL 3 TIMES DAILY PRN
Status: DISCONTINUED | OUTPATIENT
Start: 2019-01-01 | End: 2019-01-01 | Stop reason: HOSPADM

## 2019-01-01 RX ORDER — LEVOFLOXACIN 5 MG/ML
500 INJECTION, SOLUTION INTRAVENOUS ONCE
Status: COMPLETED | OUTPATIENT
Start: 2019-01-01 | End: 2019-01-01

## 2019-01-01 RX ORDER — HYDROCODONE BITARTRATE AND ACETAMINOPHEN 5; 325 MG/1; MG/1
2 TABLET ORAL EVERY 4 HOURS PRN
Status: DISCONTINUED | OUTPATIENT
Start: 2019-01-01 | End: 2019-01-01

## 2019-01-01 RX ORDER — GUAIFENESIN 600 MG/1
1200 TABLET, EXTENDED RELEASE ORAL 2 TIMES DAILY
Status: DISCONTINUED | OUTPATIENT
Start: 2019-01-01 | End: 2019-01-01 | Stop reason: HOSPADM

## 2019-01-01 RX ORDER — 0.9 % SODIUM CHLORIDE 0.9 %
500 INTRAVENOUS SOLUTION INTRAVENOUS ONCE
Status: COMPLETED | OUTPATIENT
Start: 2019-01-01 | End: 2019-01-01

## 2019-01-01 RX ORDER — ETHAMBUTOL HYDROCHLORIDE 400 MG/1
400 TABLET, FILM COATED ORAL DAILY
COMMUNITY

## 2019-01-01 RX ORDER — FUROSEMIDE 10 MG/ML
20 INJECTION INTRAMUSCULAR; INTRAVENOUS ONCE
Status: COMPLETED | OUTPATIENT
Start: 2019-01-01 | End: 2019-01-01

## 2019-01-01 RX ORDER — LACTOBACILLUS RHAMNOSUS GG 10B CELL
1 CAPSULE ORAL DAILY
Status: DISCONTINUED | OUTPATIENT
Start: 2019-01-01 | End: 2019-01-01 | Stop reason: HOSPADM

## 2019-01-01 RX ORDER — METHYLPREDNISOLONE SODIUM SUCCINATE 40 MG/ML
40 INJECTION, POWDER, LYOPHILIZED, FOR SOLUTION INTRAMUSCULAR; INTRAVENOUS DAILY
Status: DISCONTINUED | OUTPATIENT
Start: 2019-01-01 | End: 2019-01-01

## 2019-01-01 RX ORDER — ETHAMBUTOL HYDROCHLORIDE 400 MG/1
800 TABLET, FILM COATED ORAL DAILY
COMMUNITY

## 2019-01-01 RX ORDER — MOXIFLOXACIN HYDROCHLORIDE 400 MG/1
400 TABLET ORAL DAILY
Status: DISCONTINUED | OUTPATIENT
Start: 2019-01-01 | End: 2019-01-01 | Stop reason: HOSPADM

## 2019-01-01 RX ORDER — AZITHROMYCIN 500 MG/1
500 TABLET, FILM COATED ORAL DAILY
COMMUNITY

## 2019-01-01 RX ORDER — ACETAMINOPHEN 325 MG/1
650 TABLET ORAL EVERY 4 HOURS PRN
Status: DISCONTINUED | OUTPATIENT
Start: 2019-01-01 | End: 2019-01-01 | Stop reason: SDUPTHER

## 2019-01-01 RX ORDER — HYDROCODONE BITARTRATE AND ACETAMINOPHEN 5; 325 MG/1; MG/1
1 TABLET ORAL EVERY 4 HOURS PRN
Status: DISCONTINUED | OUTPATIENT
Start: 2019-01-01 | End: 2019-01-01 | Stop reason: HOSPADM

## 2019-01-01 RX ORDER — KETOROLAC TROMETHAMINE 30 MG/ML
15 INJECTION, SOLUTION INTRAMUSCULAR; INTRAVENOUS EVERY 6 HOURS PRN
Status: DISCONTINUED | OUTPATIENT
Start: 2019-01-01 | End: 2019-01-01 | Stop reason: HOSPADM

## 2019-01-01 RX ORDER — RIFAMPIN 300 MG/1
300 CAPSULE ORAL 2 TIMES DAILY
COMMUNITY

## 2019-01-01 RX ORDER — ACETAMINOPHEN 325 MG/1
650 TABLET ORAL EVERY 4 HOURS PRN
Status: DISCONTINUED | OUTPATIENT
Start: 2019-01-01 | End: 2019-01-01 | Stop reason: HOSPADM

## 2019-01-01 RX ORDER — HYDROCODONE BITARTRATE AND ACETAMINOPHEN 5; 325 MG/1; MG/1
1 TABLET ORAL ONCE
Status: COMPLETED | OUTPATIENT
Start: 2019-01-01 | End: 2019-01-01

## 2019-01-01 RX ORDER — ONDANSETRON 4 MG/1
4 TABLET, ORALLY DISINTEGRATING ORAL EVERY 8 HOURS PRN
Status: DISCONTINUED | OUTPATIENT
Start: 2019-01-01 | End: 2019-01-01

## 2019-01-01 RX ORDER — MIRTAZAPINE 15 MG/1
30 TABLET, FILM COATED ORAL NIGHTLY
Status: DISCONTINUED | OUTPATIENT
Start: 2019-01-01 | End: 2019-01-01

## 2019-01-01 RX ORDER — PREDNISONE 20 MG/1
20 TABLET ORAL DAILY
Qty: 30 TABLET | Refills: 3 | Status: SHIPPED | OUTPATIENT
Start: 2019-01-01

## 2019-01-01 RX ORDER — LEVOFLOXACIN 750 MG/1
750 TABLET ORAL DAILY
Qty: 7 TABLET | Refills: 0 | Status: SHIPPED | OUTPATIENT
Start: 2019-01-01 | End: 2019-01-01

## 2019-01-01 RX ORDER — PREDNISONE 20 MG/1
20 TABLET ORAL 2 TIMES DAILY
Qty: 20 TABLET | Refills: 0 | Status: SHIPPED | OUTPATIENT
Start: 2019-01-01 | End: 2019-01-01

## 2019-01-01 RX ORDER — MORPHINE SULFATE 4 MG/ML
INJECTION, SOLUTION INTRAMUSCULAR; INTRAVENOUS
Status: COMPLETED
Start: 2019-01-01 | End: 2019-01-01

## 2019-01-01 RX ORDER — LINEZOLID 600 MG/1
600 TABLET, FILM COATED ORAL EVERY 12 HOURS SCHEDULED
Status: COMPLETED | OUTPATIENT
Start: 2019-01-01 | End: 2019-01-01

## 2019-01-01 RX ORDER — MOXIFLOXACIN HYDROCHLORIDE 400 MG/1
400 TABLET ORAL DAILY
Qty: 30 TABLET | Refills: 0 | Status: SHIPPED | OUTPATIENT
Start: 2019-01-01 | End: 2020-01-01

## 2019-01-01 RX ORDER — ACETAMINOPHEN AND CODEINE PHOSPHATE 300; 30 MG/1; MG/1
1 TABLET ORAL EVERY 4 HOURS PRN
Qty: 18 TABLET | Refills: 0 | Status: SHIPPED | OUTPATIENT
Start: 2019-01-01 | End: 2019-01-01

## 2019-01-01 RX ORDER — MORPHINE SULFATE 4 MG/ML
4 INJECTION, SOLUTION INTRAMUSCULAR; INTRAVENOUS ONCE
Status: COMPLETED | OUTPATIENT
Start: 2019-01-01 | End: 2019-01-01

## 2019-01-01 RX ORDER — ALBUTEROL SULFATE 90 UG/1
2 AEROSOL, METERED RESPIRATORY (INHALATION)
Qty: 3 INHALER | Refills: 3 | Status: SHIPPED | OUTPATIENT
Start: 2019-01-01 | End: 2019-01-01 | Stop reason: SDUPTHER

## 2019-01-01 RX ORDER — LACTOBACILLUS RHAMNOSUS GG 10B CELL
1 CAPSULE ORAL DAILY
Qty: 30 CAPSULE | Refills: 0 | Status: SHIPPED | OUTPATIENT
Start: 2019-01-01 | End: 2020-01-01

## 2019-01-01 RX ORDER — ONDANSETRON 2 MG/ML
4 INJECTION INTRAMUSCULAR; INTRAVENOUS EVERY 6 HOURS PRN
Status: DISCONTINUED | OUTPATIENT
Start: 2019-01-01 | End: 2019-01-01 | Stop reason: HOSPADM

## 2019-01-01 RX ORDER — ALBUTEROL SULFATE 90 UG/1
2 AEROSOL, METERED RESPIRATORY (INHALATION) 4 TIMES DAILY PRN
Qty: 3 INHALER | Refills: 3 | Status: SHIPPED | OUTPATIENT
Start: 2019-01-01 | End: 2019-01-01 | Stop reason: ALTCHOICE

## 2019-01-01 RX ORDER — METHYLPREDNISOLONE SODIUM SUCCINATE 40 MG/ML
40 INJECTION, POWDER, LYOPHILIZED, FOR SOLUTION INTRAMUSCULAR; INTRAVENOUS EVERY 8 HOURS
Status: DISCONTINUED | OUTPATIENT
Start: 2019-01-01 | End: 2019-01-01

## 2019-01-01 RX ORDER — ROFLUMILAST 500 UG/1
250 TABLET ORAL DAILY
Qty: 140 TABLET | Refills: 0 | COMMUNITY
Start: 2019-01-01

## 2019-01-01 RX ORDER — SODIUM CHLORIDE 9 MG/ML
INJECTION, SOLUTION INTRAVENOUS CONTINUOUS
Status: ACTIVE | OUTPATIENT
Start: 2019-01-01 | End: 2019-01-01

## 2019-01-01 RX ORDER — ALBUTEROL SULFATE 90 UG/1
2 AEROSOL, METERED RESPIRATORY (INHALATION) EVERY 4 HOURS PRN
Qty: 3 INHALER | Refills: 3 | Status: SHIPPED | OUTPATIENT
Start: 2019-01-01

## 2019-01-01 RX ADMIN — MORPHINE SULFATE 4 MG: 4 INJECTION, SOLUTION INTRAMUSCULAR; INTRAVENOUS at 19:49

## 2019-01-01 RX ADMIN — AZITHROMYCIN 500 MG: 500 INJECTION, POWDER, LYOPHILIZED, FOR SOLUTION INTRAVENOUS at 21:48

## 2019-01-01 RX ADMIN — GUAIFENESIN 1200 MG: 600 TABLET, EXTENDED RELEASE ORAL at 17:45

## 2019-01-01 RX ADMIN — IPRATROPIUM BROMIDE AND ALBUTEROL SULFATE 1 AMPULE: .5; 3 SOLUTION RESPIRATORY (INHALATION) at 06:53

## 2019-01-01 RX ADMIN — HYDROCODONE BITARTRATE AND ACETAMINOPHEN 1 TABLET: 5; 325 TABLET ORAL at 05:13

## 2019-01-01 RX ADMIN — SODIUM CHLORIDE: 9 INJECTION, SOLUTION INTRAVENOUS at 17:45

## 2019-01-01 RX ADMIN — GUAIFENESIN 1200 MG: 600 TABLET, EXTENDED RELEASE ORAL at 07:48

## 2019-01-01 RX ADMIN — TAMSULOSIN HYDROCHLORIDE 0.4 MG: 0.4 CAPSULE ORAL at 08:57

## 2019-01-01 RX ADMIN — ENOXAPARIN SODIUM 40 MG: 40 INJECTION SUBCUTANEOUS at 11:10

## 2019-01-01 RX ADMIN — RIFAMPIN 600 MG: 300 CAPSULE ORAL at 09:53

## 2019-01-01 RX ADMIN — HYDROCODONE BITARTRATE AND ACETAMINOPHEN 2 TABLET: 5; 325 TABLET ORAL at 21:20

## 2019-01-01 RX ADMIN — Medication 1 CAPSULE: at 09:47

## 2019-01-01 RX ADMIN — GUAIFENESIN 1200 MG: 600 TABLET, EXTENDED RELEASE ORAL at 18:00

## 2019-01-01 RX ADMIN — BUDESONIDE 500 MCG: 0.5 INHALANT RESPIRATORY (INHALATION) at 19:25

## 2019-01-01 RX ADMIN — ETHAMBUTOL HYDROCHLORIDE 800 MG: 400 TABLET, FILM COATED ORAL at 08:39

## 2019-01-01 RX ADMIN — IPRATROPIUM BROMIDE AND ALBUTEROL SULFATE 1 AMPULE: .5; 3 SOLUTION RESPIRATORY (INHALATION) at 14:42

## 2019-01-01 RX ADMIN — HYDROCODONE BITARTRATE AND ACETAMINOPHEN 2 TABLET: 5; 325 TABLET ORAL at 01:17

## 2019-01-01 RX ADMIN — MEROPENEM 1 G: 1 INJECTION, POWDER, FOR SOLUTION INTRAVENOUS at 13:07

## 2019-01-01 RX ADMIN — SODIUM CHLORIDE, PRESERVATIVE FREE 10 ML: 5 INJECTION INTRAVENOUS at 22:05

## 2019-01-01 RX ADMIN — LINEZOLID 600 MG: 600 TABLET, FILM COATED ORAL at 09:59

## 2019-01-01 RX ADMIN — Medication 1 CAPSULE: at 10:20

## 2019-01-01 RX ADMIN — METHYLPREDNISOLONE ACETATE 80 MG: 80 INJECTION, SUSPENSION INTRA-ARTICULAR; INTRALESIONAL; INTRAMUSCULAR; SOFT TISSUE at 11:34

## 2019-01-01 RX ADMIN — HYDROCODONE BITARTRATE AND ACETAMINOPHEN 2 TABLET: 5; 325 TABLET ORAL at 07:20

## 2019-01-01 RX ADMIN — KETOROLAC TROMETHAMINE 15 MG: 30 INJECTION, SOLUTION INTRAMUSCULAR; INTRAVENOUS at 04:33

## 2019-01-01 RX ADMIN — MEROPENEM 1 G: 1 INJECTION, POWDER, FOR SOLUTION INTRAVENOUS at 19:59

## 2019-01-01 RX ADMIN — GUAIFENESIN 1200 MG: 600 TABLET, EXTENDED RELEASE ORAL at 09:09

## 2019-01-01 RX ADMIN — IPRATROPIUM BROMIDE AND ALBUTEROL SULFATE 1 AMPULE: .5; 3 SOLUTION RESPIRATORY (INHALATION) at 10:23

## 2019-01-01 RX ADMIN — BUDESONIDE 500 MCG: 0.5 INHALANT RESPIRATORY (INHALATION) at 07:01

## 2019-01-01 RX ADMIN — MEROPENEM 1 G: 1 INJECTION, POWDER, FOR SOLUTION INTRAVENOUS at 04:48

## 2019-01-01 RX ADMIN — KETOROLAC TROMETHAMINE 15 MG: 30 INJECTION, SOLUTION INTRAMUSCULAR at 17:30

## 2019-01-01 RX ADMIN — AZITHROMYCIN 500 MG: 500 INJECTION, POWDER, LYOPHILIZED, FOR SOLUTION INTRAVENOUS at 21:19

## 2019-01-01 RX ADMIN — HYDROXYZINE HYDROCHLORIDE 10 MG: 10 TABLET, FILM COATED ORAL at 05:04

## 2019-01-01 RX ADMIN — MEROPENEM 1 G: 1 INJECTION, POWDER, FOR SOLUTION INTRAVENOUS at 13:34

## 2019-01-01 RX ADMIN — METHYLPREDNISOLONE SODIUM SUCCINATE 40 MG: 40 INJECTION, POWDER, FOR SOLUTION INTRAMUSCULAR; INTRAVENOUS at 09:54

## 2019-01-01 RX ADMIN — ENOXAPARIN SODIUM 40 MG: 40 INJECTION SUBCUTANEOUS at 08:39

## 2019-01-01 RX ADMIN — HYDROXYZINE HYDROCHLORIDE 10 MG: 10 TABLET, FILM COATED ORAL at 09:59

## 2019-01-01 RX ADMIN — IPRATROPIUM BROMIDE AND ALBUTEROL SULFATE 1 AMPULE: .5; 3 SOLUTION RESPIRATORY (INHALATION) at 07:44

## 2019-01-01 RX ADMIN — MEROPENEM 1 G: 1 INJECTION, POWDER, FOR SOLUTION INTRAVENOUS at 03:46

## 2019-01-01 RX ADMIN — CEFEPIME 2 G: 2 INJECTION, POWDER, FOR SOLUTION INTRAVENOUS at 00:25

## 2019-01-01 RX ADMIN — LINEZOLID 600 MG: 600 TABLET, FILM COATED ORAL at 11:10

## 2019-01-01 RX ADMIN — ROFLUMILAST 500 MCG: 500 TABLET ORAL at 08:51

## 2019-01-01 RX ADMIN — Medication 1 CAPSULE: at 09:57

## 2019-01-01 RX ADMIN — BUDESONIDE 500 MCG: 0.5 INHALANT RESPIRATORY (INHALATION) at 18:45

## 2019-01-01 RX ADMIN — IPRATROPIUM BROMIDE AND ALBUTEROL SULFATE 1 AMPULE: .5; 3 SOLUTION RESPIRATORY (INHALATION) at 14:56

## 2019-01-01 RX ADMIN — SODIUM CHLORIDE, PRESERVATIVE FREE 10 ML: 5 INJECTION INTRAVENOUS at 09:48

## 2019-01-01 RX ADMIN — HYDROCODONE BITARTRATE AND ACETAMINOPHEN 2 TABLET: 5; 325 TABLET ORAL at 22:05

## 2019-01-01 RX ADMIN — RIFAMPIN 600 MG: 300 CAPSULE ORAL at 09:59

## 2019-01-01 RX ADMIN — LINEZOLID 600 MG: 600 TABLET, FILM COATED ORAL at 19:59

## 2019-01-01 RX ADMIN — BUDESONIDE 500 MCG: 0.5 INHALANT RESPIRATORY (INHALATION) at 20:16

## 2019-01-01 RX ADMIN — ETHAMBUTOL HYDROCHLORIDE 800 MG: 400 TABLET, FILM COATED ORAL at 09:57

## 2019-01-01 RX ADMIN — IPRATROPIUM BROMIDE AND ALBUTEROL SULFATE 1 AMPULE: .5; 3 SOLUTION RESPIRATORY (INHALATION) at 10:46

## 2019-01-01 RX ADMIN — BUDESONIDE 500 MCG: 0.5 INHALANT RESPIRATORY (INHALATION) at 07:04

## 2019-01-01 RX ADMIN — ROFLUMILAST 500 MCG: 500 TABLET ORAL at 11:10

## 2019-01-01 RX ADMIN — ROFLUMILAST 500 MCG: 500 TABLET ORAL at 10:00

## 2019-01-01 RX ADMIN — GUAIFENESIN 1200 MG: 600 TABLET, EXTENDED RELEASE ORAL at 11:14

## 2019-01-01 RX ADMIN — ALBUTEROL SULFATE 2.5 MG: 2.5 SOLUTION RESPIRATORY (INHALATION) at 07:04

## 2019-01-01 RX ADMIN — SODIUM CHLORIDE, PRESERVATIVE FREE 10 ML: 5 INJECTION INTRAVENOUS at 11:11

## 2019-01-01 RX ADMIN — MEROPENEM 1 G: 1 INJECTION, POWDER, FOR SOLUTION INTRAVENOUS at 20:27

## 2019-01-01 RX ADMIN — ENOXAPARIN SODIUM 40 MG: 40 INJECTION SUBCUTANEOUS at 09:10

## 2019-01-01 RX ADMIN — RIFAMPIN 600 MG: 300 CAPSULE ORAL at 07:48

## 2019-01-01 RX ADMIN — AZITHROMYCIN 500 MG: 500 INJECTION, POWDER, LYOPHILIZED, FOR SOLUTION INTRAVENOUS at 21:20

## 2019-01-01 RX ADMIN — HYDROCODONE BITARTRATE AND ACETAMINOPHEN 2 TABLET: 5; 325 TABLET ORAL at 17:16

## 2019-01-01 RX ADMIN — ETHAMBUTOL HYDROCHLORIDE 800 MG: 400 TABLET, FILM COATED ORAL at 09:15

## 2019-01-01 RX ADMIN — IPRATROPIUM BROMIDE AND ALBUTEROL SULFATE 1 AMPULE: .5; 3 SOLUTION RESPIRATORY (INHALATION) at 07:01

## 2019-01-01 RX ADMIN — MOXIFLOXACIN HYDROCHLORIDE 400 MG: 400 TABLET, FILM COATED ORAL at 08:57

## 2019-01-01 RX ADMIN — MEROPENEM 1 G: 1 INJECTION, POWDER, FOR SOLUTION INTRAVENOUS at 04:26

## 2019-01-01 RX ADMIN — ETHAMBUTOL HYDROCHLORIDE 800 MG: 400 TABLET, FILM COATED ORAL at 11:10

## 2019-01-01 RX ADMIN — HYDROXYZINE HYDROCHLORIDE 10 MG: 10 TABLET, FILM COATED ORAL at 09:32

## 2019-01-01 RX ADMIN — BUDESONIDE 500 MCG: 0.5 INHALANT RESPIRATORY (INHALATION) at 18:32

## 2019-01-01 RX ADMIN — HYDROCODONE BITARTRATE AND ACETAMINOPHEN 2 TABLET: 5; 325 TABLET ORAL at 08:10

## 2019-01-01 RX ADMIN — SODIUM CHLORIDE, PRESERVATIVE FREE 10 ML: 5 INJECTION INTRAVENOUS at 20:08

## 2019-01-01 RX ADMIN — ROFLUMILAST 500 MCG: 500 TABLET ORAL at 07:49

## 2019-01-01 RX ADMIN — AZITHROMYCIN 500 MG: 500 INJECTION, POWDER, LYOPHILIZED, FOR SOLUTION INTRAVENOUS at 00:00

## 2019-01-01 RX ADMIN — PREDNISONE 20 MG: 10 TABLET ORAL at 21:39

## 2019-01-01 RX ADMIN — MOXIFLOXACIN HYDROCHLORIDE 400 MG: 400 TABLET, FILM COATED ORAL at 13:06

## 2019-01-01 RX ADMIN — GUAIFENESIN 1200 MG: 600 TABLET, EXTENDED RELEASE ORAL at 07:20

## 2019-01-01 RX ADMIN — ENOXAPARIN SODIUM 40 MG: 40 INJECTION SUBCUTANEOUS at 08:57

## 2019-01-01 RX ADMIN — AZITHROMYCIN 500 MG: 500 INJECTION, POWDER, LYOPHILIZED, FOR SOLUTION INTRAVENOUS at 22:05

## 2019-01-01 RX ADMIN — GUAIFENESIN 1200 MG: 600 TABLET, EXTENDED RELEASE ORAL at 17:30

## 2019-01-01 RX ADMIN — HYDROCODONE BITARTRATE AND ACETAMINOPHEN 1 TABLET: 5; 325 TABLET ORAL at 22:37

## 2019-01-01 RX ADMIN — ETHAMBUTOL HYDROCHLORIDE 800 MG: 400 TABLET, FILM COATED ORAL at 08:57

## 2019-01-01 RX ADMIN — HYDROXYZINE HYDROCHLORIDE 10 MG: 10 TABLET, FILM COATED ORAL at 09:54

## 2019-01-01 RX ADMIN — HYDROCODONE BITARTRATE AND ACETAMINOPHEN 2 TABLET: 5; 325 TABLET ORAL at 17:52

## 2019-01-01 RX ADMIN — METHYLPREDNISOLONE SODIUM SUCCINATE 40 MG: 40 INJECTION, POWDER, FOR SOLUTION INTRAMUSCULAR; INTRAVENOUS at 11:10

## 2019-01-01 RX ADMIN — BUDESONIDE 500 MCG: 0.5 INHALANT RESPIRATORY (INHALATION) at 06:52

## 2019-01-01 RX ADMIN — HYDROCODONE BITARTRATE AND ACETAMINOPHEN 2 TABLET: 5; 325 TABLET ORAL at 15:28

## 2019-01-01 RX ADMIN — HYDROCODONE BITARTRATE AND ACETAMINOPHEN 2 TABLET: 5; 325 TABLET ORAL at 00:30

## 2019-01-01 RX ADMIN — MEROPENEM 1 G: 1 INJECTION, POWDER, FOR SOLUTION INTRAVENOUS at 12:57

## 2019-01-01 RX ADMIN — ENOXAPARIN SODIUM 40 MG: 40 INJECTION SUBCUTANEOUS at 09:54

## 2019-01-01 RX ADMIN — HYDROCODONE BITARTRATE AND ACETAMINOPHEN 2 TABLET: 5; 325 TABLET ORAL at 09:15

## 2019-01-01 RX ADMIN — IPRATROPIUM BROMIDE AND ALBUTEROL SULFATE 1 AMPULE: .5; 3 SOLUTION RESPIRATORY (INHALATION) at 06:42

## 2019-01-01 RX ADMIN — IPRATROPIUM BROMIDE AND ALBUTEROL SULFATE 1 AMPULE: .5; 3 SOLUTION RESPIRATORY (INHALATION) at 10:44

## 2019-01-01 RX ADMIN — GUAIFENESIN 1200 MG: 600 TABLET, EXTENDED RELEASE ORAL at 17:52

## 2019-01-01 RX ADMIN — ONDANSETRON 4 MG: 2 INJECTION INTRAMUSCULAR; INTRAVENOUS at 17:16

## 2019-01-01 RX ADMIN — BUDESONIDE 500 MCG: 0.5 INHALANT RESPIRATORY (INHALATION) at 06:29

## 2019-01-01 RX ADMIN — RIFAMPIN 600 MG: 300 CAPSULE ORAL at 08:39

## 2019-01-01 RX ADMIN — SODIUM CHLORIDE, PRESERVATIVE FREE 10 ML: 5 INJECTION INTRAVENOUS at 09:55

## 2019-01-01 RX ADMIN — HYDROCODONE BITARTRATE AND ACETAMINOPHEN 2 TABLET: 5; 325 TABLET ORAL at 04:50

## 2019-01-01 RX ADMIN — BUDESONIDE 500 MCG: 0.5 INHALANT RESPIRATORY (INHALATION) at 19:30

## 2019-01-01 RX ADMIN — BUDESONIDE 500 MCG: 0.5 INHALANT RESPIRATORY (INHALATION) at 06:53

## 2019-01-01 RX ADMIN — IPRATROPIUM BROMIDE AND ALBUTEROL SULFATE 1 AMPULE: .5; 3 SOLUTION RESPIRATORY (INHALATION) at 11:34

## 2019-01-01 RX ADMIN — GUAIFENESIN 1200 MG: 600 TABLET, EXTENDED RELEASE ORAL at 18:24

## 2019-01-01 RX ADMIN — KETOROLAC TROMETHAMINE 15 MG: 30 INJECTION, SOLUTION INTRAMUSCULAR; INTRAVENOUS at 17:45

## 2019-01-01 RX ADMIN — SODIUM CHLORIDE, PRESERVATIVE FREE 10 ML: 5 INJECTION INTRAVENOUS at 00:10

## 2019-01-01 RX ADMIN — METHYLPREDNISOLONE SODIUM SUCCINATE 40 MG: 40 INJECTION, POWDER, FOR SOLUTION INTRAMUSCULAR; INTRAVENOUS at 09:10

## 2019-01-01 RX ADMIN — IPRATROPIUM BROMIDE AND ALBUTEROL SULFATE 1 AMPULE: .5; 3 SOLUTION RESPIRATORY (INHALATION) at 11:11

## 2019-01-01 RX ADMIN — HYDROXYZINE HYDROCHLORIDE 10 MG: 10 TABLET, FILM COATED ORAL at 13:03

## 2019-01-01 RX ADMIN — SODIUM CHLORIDE: 9 INJECTION, SOLUTION INTRAVENOUS at 11:23

## 2019-01-01 RX ADMIN — BUDESONIDE 500 MCG: 0.5 INHALANT RESPIRATORY (INHALATION) at 06:42

## 2019-01-01 RX ADMIN — ETHAMBUTOL HYDROCHLORIDE 800 MG: 400 TABLET, FILM COATED ORAL at 09:09

## 2019-01-01 RX ADMIN — METHYLPREDNISOLONE SODIUM SUCCINATE 40 MG: 40 INJECTION, POWDER, FOR SOLUTION INTRAMUSCULAR; INTRAVENOUS at 21:34

## 2019-01-01 RX ADMIN — IPRATROPIUM BROMIDE AND ALBUTEROL SULFATE 1 AMPULE: .5; 3 SOLUTION RESPIRATORY (INHALATION) at 14:38

## 2019-01-01 RX ADMIN — ETHAMBUTOL HYDROCHLORIDE 800 MG: 400 TABLET, FILM COATED ORAL at 09:47

## 2019-01-01 RX ADMIN — CEFEPIME 2 G: 2 INJECTION, POWDER, FOR SOLUTION INTRAVENOUS at 12:19

## 2019-01-01 RX ADMIN — SODIUM CHLORIDE 500 ML: 9 INJECTION, SOLUTION INTRAVENOUS at 09:16

## 2019-01-01 RX ADMIN — GUAIFENESIN 1200 MG: 600 TABLET, EXTENDED RELEASE ORAL at 09:58

## 2019-01-01 RX ADMIN — ONDANSETRON 4 MG: 2 INJECTION INTRAMUSCULAR; INTRAVENOUS at 18:25

## 2019-01-01 RX ADMIN — ENOXAPARIN SODIUM 40 MG: 40 INJECTION SUBCUTANEOUS at 07:48

## 2019-01-01 RX ADMIN — IPRATROPIUM BROMIDE AND ALBUTEROL SULFATE 1 AMPULE: .5; 3 SOLUTION RESPIRATORY (INHALATION) at 14:32

## 2019-01-01 RX ADMIN — FUROSEMIDE 20 MG: 10 INJECTION, SOLUTION INTRAMUSCULAR; INTRAVENOUS at 09:53

## 2019-01-01 RX ADMIN — PREDNISONE 20 MG: 10 TABLET ORAL at 20:07

## 2019-01-01 RX ADMIN — LEVOFLOXACIN 500 MG: 5 INJECTION, SOLUTION INTRAVENOUS at 16:23

## 2019-01-01 RX ADMIN — FUROSEMIDE 20 MG: 10 INJECTION, SOLUTION INTRAMUSCULAR; INTRAVENOUS at 12:20

## 2019-01-01 RX ADMIN — IPRATROPIUM BROMIDE AND ALBUTEROL SULFATE 1 AMPULE: .5; 3 SOLUTION RESPIRATORY (INHALATION) at 18:45

## 2019-01-01 RX ADMIN — LINEZOLID 600 MG: 600 TABLET, FILM COATED ORAL at 11:43

## 2019-01-01 RX ADMIN — Medication 1 CAPSULE: at 08:57

## 2019-01-01 RX ADMIN — HYDROXYZINE HYDROCHLORIDE 10 MG: 10 TABLET, FILM COATED ORAL at 21:05

## 2019-01-01 RX ADMIN — IPRATROPIUM BROMIDE AND ALBUTEROL SULFATE 1 AMPULE: .5; 3 SOLUTION RESPIRATORY (INHALATION) at 07:05

## 2019-01-01 RX ADMIN — CEFEPIME 2 G: 2 INJECTION, POWDER, FOR SOLUTION INTRAVENOUS at 23:11

## 2019-01-01 RX ADMIN — METHYLPREDNISOLONE ACETATE 80 MG: 80 INJECTION, SUSPENSION INTRA-ARTICULAR; INTRALESIONAL; INTRAMUSCULAR; SOFT TISSUE at 11:09

## 2019-01-01 RX ADMIN — ALBUTEROL SULFATE 2.5 MG: 2.5 SOLUTION RESPIRATORY (INHALATION) at 10:46

## 2019-01-01 RX ADMIN — LINEZOLID 600 MG: 600 TABLET, FILM COATED ORAL at 20:07

## 2019-01-01 RX ADMIN — IPRATROPIUM BROMIDE AND ALBUTEROL SULFATE 1 AMPULE: .5; 3 SOLUTION RESPIRATORY (INHALATION) at 18:32

## 2019-01-01 RX ADMIN — SODIUM CHLORIDE, PRESERVATIVE FREE 10 ML: 5 INJECTION INTRAVENOUS at 20:14

## 2019-01-01 RX ADMIN — AZITHROMYCIN 500 MG: 500 INJECTION, POWDER, LYOPHILIZED, FOR SOLUTION INTRAVENOUS at 22:04

## 2019-01-01 RX ADMIN — LINEZOLID 600 MG: 600 TABLET, FILM COATED ORAL at 08:57

## 2019-01-01 RX ADMIN — SODIUM CHLORIDE, PRESERVATIVE FREE 10 ML: 5 INJECTION INTRAVENOUS at 21:49

## 2019-01-01 RX ADMIN — AZITHROMYCIN 500 MG: 500 INJECTION, POWDER, LYOPHILIZED, FOR SOLUTION INTRAVENOUS at 21:39

## 2019-01-01 RX ADMIN — RIFAMPIN 600 MG: 300 CAPSULE ORAL at 00:14

## 2019-01-01 RX ADMIN — IPRATROPIUM BROMIDE AND ALBUTEROL SULFATE 1 AMPULE: .5; 3 SOLUTION RESPIRATORY (INHALATION) at 11:05

## 2019-01-01 RX ADMIN — BUDESONIDE 500 MCG: 0.5 INHALANT RESPIRATORY (INHALATION) at 19:40

## 2019-01-01 RX ADMIN — RIFAMPIN 600 MG: 300 CAPSULE ORAL at 09:58

## 2019-01-01 RX ADMIN — IOPAMIDOL 90 ML: 755 INJECTION, SOLUTION INTRAVENOUS at 09:28

## 2019-01-01 RX ADMIN — IPRATROPIUM BROMIDE AND ALBUTEROL SULFATE 1 AMPULE: .5; 3 SOLUTION RESPIRATORY (INHALATION) at 15:34

## 2019-01-01 RX ADMIN — IPRATROPIUM BROMIDE AND ALBUTEROL SULFATE 1 AMPULE: .5; 3 SOLUTION RESPIRATORY (INHALATION) at 07:04

## 2019-01-01 RX ADMIN — MEROPENEM 1 G: 1 INJECTION, POWDER, FOR SOLUTION INTRAVENOUS at 20:07

## 2019-01-01 RX ADMIN — PREDNISONE 20 MG: 10 TABLET ORAL at 08:57

## 2019-01-01 RX ADMIN — CEFEPIME HYDROCHLORIDE 2 G: 2 INJECTION, POWDER, FOR SOLUTION INTRAVENOUS at 16:21

## 2019-01-01 RX ADMIN — ROFLUMILAST 500 MCG: 500 TABLET ORAL at 08:58

## 2019-01-01 RX ADMIN — HYDROXYZINE HYDROCHLORIDE 10 MG: 10 TABLET, FILM COATED ORAL at 15:28

## 2019-01-01 RX ADMIN — GUAIFENESIN 1200 MG: 600 TABLET, EXTENDED RELEASE ORAL at 17:57

## 2019-01-01 RX ADMIN — RIFAMPIN 600 MG: 300 CAPSULE ORAL at 08:57

## 2019-01-01 RX ADMIN — IPRATROPIUM BROMIDE AND ALBUTEROL SULFATE 1 AMPULE: .5; 3 SOLUTION RESPIRATORY (INHALATION) at 06:52

## 2019-01-01 RX ADMIN — IPRATROPIUM BROMIDE AND ALBUTEROL SULFATE 1 AMPULE: .5; 3 SOLUTION RESPIRATORY (INHALATION) at 19:31

## 2019-01-01 RX ADMIN — BUDESONIDE 500 MCG: 0.5 INHALANT RESPIRATORY (INHALATION) at 19:04

## 2019-01-01 RX ADMIN — GUAIFENESIN 1200 MG: 600 TABLET, EXTENDED RELEASE ORAL at 17:46

## 2019-01-01 RX ADMIN — HYDROCODONE BITARTRATE AND ACETAMINOPHEN 2 TABLET: 5; 325 TABLET ORAL at 15:38

## 2019-01-01 RX ADMIN — ENOXAPARIN SODIUM 40 MG: 40 INJECTION SUBCUTANEOUS at 08:58

## 2019-01-01 RX ADMIN — HYDROCODONE BITARTRATE AND ACETAMINOPHEN 2 TABLET: 5; 325 TABLET ORAL at 20:14

## 2019-01-01 RX ADMIN — GUAIFENESIN 1200 MG: 600 TABLET, EXTENDED RELEASE ORAL at 09:54

## 2019-01-01 RX ADMIN — ENOXAPARIN SODIUM 40 MG: 40 INJECTION SUBCUTANEOUS at 09:58

## 2019-01-01 RX ADMIN — ROFLUMILAST 500 MCG: 500 TABLET ORAL at 09:57

## 2019-01-01 RX ADMIN — HYDROXYZINE HYDROCHLORIDE 10 MG: 10 TABLET, FILM COATED ORAL at 18:24

## 2019-01-01 RX ADMIN — ETHAMBUTOL HYDROCHLORIDE 800 MG: 400 TABLET, FILM COATED ORAL at 00:14

## 2019-01-01 RX ADMIN — GUAIFENESIN 1200 MG: 600 TABLET, EXTENDED RELEASE ORAL at 17:23

## 2019-01-01 RX ADMIN — ALBUTEROL SULFATE 2.5 MG: 2.5 SOLUTION RESPIRATORY (INHALATION) at 19:30

## 2019-01-01 RX ADMIN — SODIUM CHLORIDE, PRESERVATIVE FREE 10 ML: 5 INJECTION INTRAVENOUS at 23:11

## 2019-01-01 RX ADMIN — GUAIFENESIN 1200 MG: 600 TABLET, EXTENDED RELEASE ORAL at 08:57

## 2019-01-01 RX ADMIN — BUDESONIDE 500 MCG: 0.5 INHALANT RESPIRATORY (INHALATION) at 20:37

## 2019-01-01 RX ADMIN — CEFEPIME 2 G: 2 INJECTION, POWDER, FOR SOLUTION INTRAVENOUS at 00:32

## 2019-01-01 RX ADMIN — SODIUM CHLORIDE, PRESERVATIVE FREE 10 ML: 5 INJECTION INTRAVENOUS at 22:34

## 2019-01-01 RX ADMIN — SODIUM CHLORIDE, PRESERVATIVE FREE 10 ML: 5 INJECTION INTRAVENOUS at 10:07

## 2019-01-01 RX ADMIN — TAMSULOSIN HYDROCHLORIDE 0.4 MG: 0.4 CAPSULE ORAL at 09:58

## 2019-01-01 RX ADMIN — AZITHROMYCIN 500 MG: 500 INJECTION, POWDER, LYOPHILIZED, FOR SOLUTION INTRAVENOUS at 20:21

## 2019-01-01 RX ADMIN — METHYLPREDNISOLONE SODIUM SUCCINATE 40 MG: 40 INJECTION, POWDER, FOR SOLUTION INTRAMUSCULAR; INTRAVENOUS at 21:00

## 2019-01-01 RX ADMIN — ROFLUMILAST 500 MCG: 500 TABLET ORAL at 09:47

## 2019-01-01 RX ADMIN — HYDROCODONE BITARTRATE AND ACETAMINOPHEN 2 TABLET: 5; 325 TABLET ORAL at 21:34

## 2019-01-01 RX ADMIN — Medication 1 CAPSULE: at 08:51

## 2019-01-01 RX ADMIN — SODIUM CHLORIDE, PRESERVATIVE FREE 1 G: 5 INJECTION INTRAVENOUS at 22:38

## 2019-01-01 RX ADMIN — PREDNISONE 20 MG: 10 TABLET ORAL at 09:58

## 2019-01-01 RX ADMIN — PREDNISONE 20 MG: 10 TABLET ORAL at 21:21

## 2019-01-01 RX ADMIN — ETHAMBUTOL HYDROCHLORIDE 800 MG: 400 TABLET, FILM COATED ORAL at 09:59

## 2019-01-01 RX ADMIN — IPRATROPIUM BROMIDE AND ALBUTEROL SULFATE 1 AMPULE: .5; 3 SOLUTION RESPIRATORY (INHALATION) at 15:29

## 2019-01-01 RX ADMIN — ETHAMBUTOL HYDROCHLORIDE 800 MG: 400 TABLET, FILM COATED ORAL at 07:48

## 2019-01-01 RX ADMIN — TAMSULOSIN HYDROCHLORIDE 0.4 MG: 0.4 CAPSULE ORAL at 09:48

## 2019-01-01 RX ADMIN — HYDROCODONE BITARTRATE AND ACETAMINOPHEN 1 TABLET: 5; 325 TABLET ORAL at 14:14

## 2019-01-01 RX ADMIN — HYDROCODONE BITARTRATE AND ACETAMINOPHEN 1 TABLET: 5; 325 TABLET ORAL at 21:21

## 2019-01-01 RX ADMIN — CEFEPIME 2 G: 2 INJECTION, POWDER, FOR SOLUTION INTRAVENOUS at 13:03

## 2019-01-01 RX ADMIN — IPRATROPIUM BROMIDE AND ALBUTEROL SULFATE 1 AMPULE: .5; 3 SOLUTION RESPIRATORY (INHALATION) at 14:43

## 2019-01-01 RX ADMIN — HYDROCODONE BITARTRATE AND ACETAMINOPHEN 2 TABLET: 5; 325 TABLET ORAL at 21:46

## 2019-01-01 RX ADMIN — HYDROXYZINE HYDROCHLORIDE 10 MG: 10 TABLET, FILM COATED ORAL at 16:03

## 2019-01-01 RX ADMIN — LINEZOLID 600 MG: 600 TABLET, FILM COATED ORAL at 20:10

## 2019-01-01 RX ADMIN — AZITHROMYCIN 500 MG: 500 INJECTION, POWDER, LYOPHILIZED, FOR SOLUTION INTRAVENOUS at 21:23

## 2019-01-01 RX ADMIN — MEROPENEM 1 G: 1 INJECTION, POWDER, FOR SOLUTION INTRAVENOUS at 20:13

## 2019-01-01 RX ADMIN — SODIUM CHLORIDE, PRESERVATIVE FREE 10 ML: 5 INJECTION INTRAVENOUS at 21:39

## 2019-01-01 RX ADMIN — BUDESONIDE 500 MCG: 0.5 INHALANT RESPIRATORY (INHALATION) at 07:05

## 2019-01-01 RX ADMIN — HYDROCODONE BITARTRATE AND ACETAMINOPHEN 2 TABLET: 5; 325 TABLET ORAL at 09:03

## 2019-01-01 RX ADMIN — RIFAMPIN 600 MG: 300 CAPSULE ORAL at 08:51

## 2019-01-01 RX ADMIN — HYDROCODONE BITARTRATE AND ACETAMINOPHEN 2 TABLET: 5; 325 TABLET ORAL at 07:46

## 2019-01-01 RX ADMIN — AZITHROMYCIN 500 MG: 500 INJECTION, POWDER, LYOPHILIZED, FOR SOLUTION INTRAVENOUS at 21:35

## 2019-01-01 RX ADMIN — LINEZOLID 600 MG: 600 TABLET, FILM COATED ORAL at 08:51

## 2019-01-01 RX ADMIN — METHYLPREDNISOLONE SODIUM SUCCINATE 40 MG: 40 INJECTION, POWDER, FOR SOLUTION INTRAMUSCULAR; INTRAVENOUS at 08:39

## 2019-01-01 RX ADMIN — RIFAMPIN 600 MG: 300 CAPSULE ORAL at 09:09

## 2019-01-01 RX ADMIN — METHYLPREDNISOLONE SODIUM SUCCINATE 40 MG: 40 INJECTION, POWDER, FOR SOLUTION INTRAMUSCULAR; INTRAVENOUS at 22:04

## 2019-01-01 RX ADMIN — GUAIFENESIN 1200 MG: 600 TABLET, EXTENDED RELEASE ORAL at 09:59

## 2019-01-01 RX ADMIN — ENOXAPARIN SODIUM 40 MG: 40 INJECTION SUBCUTANEOUS at 09:46

## 2019-01-01 RX ADMIN — PREDNISONE 20 MG: 10 TABLET ORAL at 08:51

## 2019-01-01 RX ADMIN — HYDROCODONE BITARTRATE AND ACETAMINOPHEN 2 TABLET: 5; 325 TABLET ORAL at 21:58

## 2019-01-01 RX ADMIN — ONDANSETRON 4 MG: 2 INJECTION INTRAMUSCULAR; INTRAVENOUS at 09:31

## 2019-01-01 RX ADMIN — HYDROXYZINE HYDROCHLORIDE 10 MG: 10 TABLET, FILM COATED ORAL at 09:04

## 2019-01-01 RX ADMIN — ETHAMBUTOL HYDROCHLORIDE 800 MG: 400 TABLET, FILM COATED ORAL at 08:51

## 2019-01-01 RX ADMIN — METHYLPREDNISOLONE SODIUM SUCCINATE 40 MG: 40 INJECTION, POWDER, FOR SOLUTION INTRAMUSCULAR; INTRAVENOUS at 07:48

## 2019-01-01 RX ADMIN — HYDROCODONE BITARTRATE AND ACETAMINOPHEN 2 TABLET: 5; 325 TABLET ORAL at 23:48

## 2019-01-01 RX ADMIN — AZITHROMYCIN 500 MG: 500 INJECTION, POWDER, LYOPHILIZED, FOR SOLUTION INTRAVENOUS at 22:34

## 2019-01-01 RX ADMIN — GUAIFENESIN 1200 MG: 600 TABLET, EXTENDED RELEASE ORAL at 08:10

## 2019-01-01 RX ADMIN — METHYLPREDNISOLONE SODIUM SUCCINATE 40 MG: 40 INJECTION, POWDER, FOR SOLUTION INTRAMUSCULAR; INTRAVENOUS at 21:19

## 2019-01-01 RX ADMIN — HYDROCODONE BITARTRATE AND ACETAMINOPHEN 2 TABLET: 5; 325 TABLET ORAL at 10:00

## 2019-01-01 RX ADMIN — HYDROXYZINE HYDROCHLORIDE 10 MG: 10 TABLET, FILM COATED ORAL at 17:52

## 2019-01-01 RX ADMIN — RIFAMPIN 600 MG: 300 CAPSULE ORAL at 08:58

## 2019-01-01 RX ADMIN — HYDROCODONE BITARTRATE AND ACETAMINOPHEN 2 TABLET: 5; 325 TABLET ORAL at 21:39

## 2019-01-01 RX ADMIN — METHYLPREDNISOLONE SODIUM SUCCINATE 40 MG: 40 INJECTION, POWDER, FOR SOLUTION INTRAMUSCULAR; INTRAVENOUS at 20:21

## 2019-01-01 RX ADMIN — CEFEPIME 2 G: 2 INJECTION, POWDER, FOR SOLUTION INTRAVENOUS at 00:09

## 2019-01-01 RX ADMIN — PREDNISONE 20 MG: 10 TABLET ORAL at 09:59

## 2019-01-01 RX ADMIN — ROFLUMILAST 500 MCG: 500 TABLET ORAL at 08:57

## 2019-01-01 RX ADMIN — HYDROXYZINE HYDROCHLORIDE 10 MG: 10 TABLET, FILM COATED ORAL at 17:01

## 2019-01-01 RX ADMIN — KETOROLAC TROMETHAMINE 15 MG: 30 INJECTION, SOLUTION INTRAMUSCULAR; INTRAVENOUS at 12:13

## 2019-01-01 RX ADMIN — TAMSULOSIN HYDROCHLORIDE 0.4 MG: 0.4 CAPSULE ORAL at 08:50

## 2019-01-01 RX ADMIN — SODIUM CHLORIDE, PRESERVATIVE FREE 10 ML: 5 INJECTION INTRAVENOUS at 20:00

## 2019-01-01 RX ADMIN — IPRATROPIUM BROMIDE AND ALBUTEROL SULFATE 1 AMPULE: .5; 3 SOLUTION RESPIRATORY (INHALATION) at 10:54

## 2019-01-01 RX ADMIN — IPRATROPIUM BROMIDE AND ALBUTEROL SULFATE 1 AMPULE: .5; 3 SOLUTION RESPIRATORY (INHALATION) at 06:29

## 2019-01-01 RX ADMIN — PREDNISONE 20 MG: 10 TABLET ORAL at 20:13

## 2019-01-01 RX ADMIN — LINEZOLID 600 MG: 600 TABLET, FILM COATED ORAL at 21:40

## 2019-01-01 RX ADMIN — MEROPENEM 1 G: 1 INJECTION, POWDER, FOR SOLUTION INTRAVENOUS at 11:58

## 2019-01-01 RX ADMIN — IPRATROPIUM BROMIDE AND ALBUTEROL SULFATE 1 AMPULE: .5; 3 SOLUTION RESPIRATORY (INHALATION) at 19:40

## 2019-01-01 RX ADMIN — GUAIFENESIN 1200 MG: 600 TABLET, EXTENDED RELEASE ORAL at 17:01

## 2019-01-01 RX ADMIN — IPRATROPIUM BROMIDE AND ALBUTEROL SULFATE 1 AMPULE: .5; 3 SOLUTION RESPIRATORY (INHALATION) at 20:37

## 2019-01-01 RX ADMIN — METHYLPREDNISOLONE SODIUM SUCCINATE 40 MG: 40 INJECTION, POWDER, FOR SOLUTION INTRAMUSCULAR; INTRAVENOUS at 09:16

## 2019-01-01 RX ADMIN — MEROPENEM 1 G: 1 INJECTION, POWDER, FOR SOLUTION INTRAVENOUS at 05:04

## 2019-01-01 RX ADMIN — ETHAMBUTOL HYDROCHLORIDE 800 MG: 400 TABLET, FILM COATED ORAL at 09:54

## 2019-01-01 RX ADMIN — HYDROCODONE BITARTRATE AND ACETAMINOPHEN 2 TABLET: 5; 325 TABLET ORAL at 18:25

## 2019-01-01 RX ADMIN — GUAIFENESIN 1200 MG: 600 TABLET, EXTENDED RELEASE ORAL at 09:15

## 2019-01-01 RX ADMIN — SODIUM CHLORIDE, PRESERVATIVE FREE 10 ML: 5 INJECTION INTRAVENOUS at 21:21

## 2019-01-01 RX ADMIN — IPRATROPIUM BROMIDE AND ALBUTEROL SULFATE 1 AMPULE: .5; 3 SOLUTION RESPIRATORY (INHALATION) at 19:03

## 2019-01-01 RX ADMIN — ONDANSETRON 4 MG: 4 TABLET, ORALLY DISINTEGRATING ORAL at 09:15

## 2019-01-01 RX ADMIN — GUAIFENESIN 1200 MG: 600 TABLET, EXTENDED RELEASE ORAL at 18:44

## 2019-01-01 RX ADMIN — RIFAMPIN 600 MG: 300 CAPSULE ORAL at 09:46

## 2019-01-01 RX ADMIN — MEROPENEM 1 G: 1 INJECTION, POWDER, FOR SOLUTION INTRAVENOUS at 20:10

## 2019-01-01 RX ADMIN — GUAIFENESIN 1200 MG: 600 TABLET, EXTENDED RELEASE ORAL at 08:58

## 2019-01-01 RX ADMIN — SODIUM CHLORIDE, PRESERVATIVE FREE 10 ML: 5 INJECTION INTRAVENOUS at 08:58

## 2019-01-01 RX ADMIN — HYDROCODONE BITARTRATE AND ACETAMINOPHEN 2 TABLET: 5; 325 TABLET ORAL at 12:19

## 2019-01-01 RX ADMIN — IPRATROPIUM BROMIDE AND ALBUTEROL SULFATE 1 AMPULE: .5; 3 SOLUTION RESPIRATORY (INHALATION) at 15:14

## 2019-01-01 RX ADMIN — METHYLPREDNISOLONE SODIUM SUCCINATE 40 MG: 40 INJECTION, POWDER, FOR SOLUTION INTRAMUSCULAR; INTRAVENOUS at 09:02

## 2019-01-01 RX ADMIN — ENOXAPARIN SODIUM 40 MG: 40 INJECTION SUBCUTANEOUS at 09:19

## 2019-01-01 RX ADMIN — ROFLUMILAST 500 MCG: 500 TABLET ORAL at 09:53

## 2019-01-01 RX ADMIN — ENOXAPARIN SODIUM 40 MG: 40 INJECTION SUBCUTANEOUS at 08:51

## 2019-01-01 RX ADMIN — IPRATROPIUM BROMIDE AND ALBUTEROL SULFATE 1 AMPULE: .5; 3 SOLUTION RESPIRATORY (INHALATION) at 14:41

## 2019-01-01 RX ADMIN — SODIUM CHLORIDE, PRESERVATIVE FREE 10 ML: 5 INJECTION INTRAVENOUS at 08:40

## 2019-01-01 RX ADMIN — RIFAMPIN 600 MG: 300 CAPSULE ORAL at 11:10

## 2019-01-01 RX ADMIN — BUDESONIDE 500 MCG: 0.5 INHALANT RESPIRATORY (INHALATION) at 07:44

## 2019-01-01 RX ADMIN — ROFLUMILAST 500 MCG: 500 TABLET ORAL at 09:15

## 2019-01-01 RX ADMIN — ONDANSETRON 4 MG: 2 INJECTION INTRAMUSCULAR; INTRAVENOUS at 17:06

## 2019-01-01 RX ADMIN — ONDANSETRON 4 MG: 4 TABLET, ORALLY DISINTEGRATING ORAL at 23:09

## 2019-01-01 RX ADMIN — LABETALOL 20 MG/4 ML (5 MG/ML) INTRAVENOUS SYRINGE 10 MG: at 14:53

## 2019-01-01 RX ADMIN — HYDROXYZINE HYDROCHLORIDE 10 MG: 10 TABLET, FILM COATED ORAL at 17:31

## 2019-01-01 RX ADMIN — IPRATROPIUM BROMIDE AND ALBUTEROL SULFATE 1 AMPULE: .5; 3 SOLUTION RESPIRATORY (INHALATION) at 14:22

## 2019-01-01 RX ADMIN — ROFLUMILAST 500 MCG: 500 TABLET ORAL at 08:39

## 2019-01-01 RX ADMIN — ROFLUMILAST 500 MCG: 500 TABLET ORAL at 09:09

## 2019-01-01 RX ADMIN — HYDROCODONE BITARTRATE AND ACETAMINOPHEN 2 TABLET: 5; 325 TABLET ORAL at 01:31

## 2019-01-01 RX ADMIN — SODIUM CHLORIDE, PRESERVATIVE FREE 10 ML: 5 INJECTION INTRAVENOUS at 08:57

## 2019-01-01 RX ADMIN — RIFAMPIN 600 MG: 300 CAPSULE ORAL at 09:15

## 2019-01-01 RX ADMIN — GUAIFENESIN 1200 MG: 600 TABLET, EXTENDED RELEASE ORAL at 18:49

## 2019-01-01 RX ADMIN — SODIUM CHLORIDE, PRESERVATIVE FREE 10 ML: 5 INJECTION INTRAVENOUS at 08:51

## 2019-01-01 RX ADMIN — IPRATROPIUM BROMIDE AND ALBUTEROL SULFATE 1 AMPULE: .5; 3 SOLUTION RESPIRATORY (INHALATION) at 11:07

## 2019-01-01 RX ADMIN — IPRATROPIUM BROMIDE AND ALBUTEROL SULFATE 1 AMPULE: .5; 3 SOLUTION RESPIRATORY (INHALATION) at 19:04

## 2019-01-01 RX ADMIN — KETOROLAC TROMETHAMINE 15 MG: 30 INJECTION, SOLUTION INTRAMUSCULAR at 09:32

## 2019-01-01 RX ADMIN — HYDROXYZINE HYDROCHLORIDE 10 MG: 10 TABLET, FILM COATED ORAL at 08:10

## 2019-01-01 RX ADMIN — AZITHROMYCIN 500 MG: 500 INJECTION, POWDER, LYOPHILIZED, FOR SOLUTION INTRAVENOUS at 22:27

## 2019-01-01 RX ADMIN — HYDROCODONE BITARTRATE AND ACETAMINOPHEN 1 TABLET: 5; 325 TABLET ORAL at 05:11

## 2019-01-01 RX ADMIN — HYDROXYZINE HYDROCHLORIDE 10 MG: 10 TABLET, FILM COATED ORAL at 07:46

## 2019-01-01 RX ADMIN — CEFEPIME 2 G: 2 INJECTION, POWDER, FOR SOLUTION INTRAVENOUS at 12:12

## 2019-01-01 RX ADMIN — MEROPENEM 1 G: 1 INJECTION, POWDER, FOR SOLUTION INTRAVENOUS at 12:45

## 2019-01-01 RX ADMIN — IPRATROPIUM BROMIDE AND ALBUTEROL SULFATE 1 AMPULE: .5; 3 SOLUTION RESPIRATORY (INHALATION) at 19:30

## 2019-01-01 RX ADMIN — ONDANSETRON 4 MG: 2 INJECTION INTRAMUSCULAR; INTRAVENOUS at 09:54

## 2019-01-01 RX ADMIN — HYDROCODONE BITARTRATE AND ACETAMINOPHEN 1 TABLET: 5; 325 TABLET ORAL at 22:27

## 2019-01-01 RX ADMIN — GUAIFENESIN 1200 MG: 600 TABLET, EXTENDED RELEASE ORAL at 08:44

## 2019-01-01 RX ADMIN — IPRATROPIUM BROMIDE AND ALBUTEROL SULFATE 1 AMPULE: .5; 3 SOLUTION RESPIRATORY (INHALATION) at 20:16

## 2019-01-01 RX ADMIN — IPRATROPIUM BROMIDE AND ALBUTEROL SULFATE 1 AMPULE: .5; 3 SOLUTION RESPIRATORY (INHALATION) at 10:56

## 2019-01-01 RX ADMIN — HYDROCODONE BITARTRATE AND ACETAMINOPHEN 2 TABLET: 5; 325 TABLET ORAL at 07:48

## 2019-01-01 RX ADMIN — SODIUM CHLORIDE, PRESERVATIVE FREE 1 G: 5 INJECTION INTRAVENOUS at 22:48

## 2019-01-01 RX ADMIN — BUDESONIDE 500 MCG: 0.5 INHALANT RESPIRATORY (INHALATION) at 19:03

## 2019-01-01 RX ADMIN — PREDNISONE 20 MG: 10 TABLET ORAL at 09:47

## 2019-01-01 RX ADMIN — LINEZOLID 600 MG: 600 TABLET, FILM COATED ORAL at 20:13

## 2019-01-01 RX ADMIN — SODIUM CHLORIDE, PRESERVATIVE FREE 10 ML: 5 INJECTION INTRAVENOUS at 07:59

## 2019-01-01 RX ADMIN — HYDROCODONE BITARTRATE AND ACETAMINOPHEN 2 TABLET: 5; 325 TABLET ORAL at 14:30

## 2019-01-01 RX ADMIN — IPRATROPIUM BROMIDE AND ALBUTEROL SULFATE 1 AMPULE: .5; 3 SOLUTION RESPIRATORY (INHALATION) at 19:25

## 2019-01-01 RX ADMIN — MOXIFLOXACIN HYDROCHLORIDE 400 MG: 400 TABLET, FILM COATED ORAL at 09:57

## 2019-01-01 RX ADMIN — IPRATROPIUM BROMIDE AND ALBUTEROL SULFATE 1 AMPULE: .5; 3 SOLUTION RESPIRATORY (INHALATION) at 11:49

## 2019-01-01 RX ADMIN — PREDNISONE 20 MG: 10 TABLET ORAL at 19:59

## 2019-01-01 ASSESSMENT — PAIN DESCRIPTION - ORIENTATION
ORIENTATION: LOWER
ORIENTATION: MID
ORIENTATION: LOWER
ORIENTATION: MID
ORIENTATION: LOWER
ORIENTATION: MID

## 2019-01-01 ASSESSMENT — PAIN DESCRIPTION - PROGRESSION
CLINICAL_PROGRESSION: NOT CHANGED

## 2019-01-01 ASSESSMENT — PAIN DESCRIPTION - LOCATION
LOCATION: BACK
LOCATION: BACK;ARM
LOCATION: BACK
LOCATION: BACK;ARM
LOCATION: BACK
LOCATION: RIB CAGE
LOCATION: BACK

## 2019-01-01 ASSESSMENT — ENCOUNTER SYMPTOMS
BACK PAIN: 1
SHORTNESS OF BREATH: 1
ABDOMINAL PAIN: 0
SHORTNESS OF BREATH: 1
BACK PAIN: 1
SHORTNESS OF BREATH: 1
GASTROINTESTINAL NEGATIVE: 1
COUGH: 1
CHEST TIGHTNESS: 1
GASTROINTESTINAL NEGATIVE: 1
BACK PAIN: 1
EYES NEGATIVE: 1
BACK PAIN: 1
GASTROINTESTINAL NEGATIVE: 1
COUGH: 1
EYES NEGATIVE: 1
CHEST TIGHTNESS: 1
BACK PAIN: 1
BACK PAIN: 1
SHORTNESS OF BREATH: 1
CONSTIPATION: 0
GASTROINTESTINAL NEGATIVE: 1
EYES NEGATIVE: 1
COUGH: 1
DIARRHEA: 0
COUGH: 1
VOICE CHANGE: 0
DIARRHEA: 0
COUGH: 1
RECTAL PAIN: 0
GASTROINTESTINAL NEGATIVE: 1
SHORTNESS OF BREATH: 1
COUGH: 1
BLOOD IN STOOL: 0
SHORTNESS OF BREATH: 1
COUGH: 1
EYES NEGATIVE: 1
SHORTNESS OF BREATH: 1
VOMITING: 0
CHEST TIGHTNESS: 1
ABDOMINAL DISTENTION: 0
NAUSEA: 0
GASTROINTESTINAL NEGATIVE: 1
SHORTNESS OF BREATH: 1
ABDOMINAL PAIN: 0
EYES NEGATIVE: 1
CHEST TIGHTNESS: 1
SHORTNESS OF BREATH: 1
SORE THROAT: 0
BACK PAIN: 0
CHEST TIGHTNESS: 0
SHORTNESS OF BREATH: 1
NAUSEA: 0
GASTROINTESTINAL NEGATIVE: 1
CONSTIPATION: 0
EYES NEGATIVE: 1
COUGH: 1
VOMITING: 0
COUGH: 1
EYES NEGATIVE: 1
BACK PAIN: 1

## 2019-01-01 ASSESSMENT — PAIN DESCRIPTION - DESCRIPTORS
DESCRIPTORS: JABBING
DESCRIPTORS: OTHER (COMMENT)
DESCRIPTORS: CONSTANT;STABBING
DESCRIPTORS: CONSTANT
DESCRIPTORS: ACHING;CONSTANT
DESCRIPTORS: CONSTANT
DESCRIPTORS: ACHING;CONSTANT
DESCRIPTORS: CONSTANT

## 2019-01-01 ASSESSMENT — PAIN SCALES - GENERAL
PAINLEVEL_OUTOF10: 4
PAINLEVEL_OUTOF10: 8
PAINLEVEL_OUTOF10: 3
PAINLEVEL_OUTOF10: 6
PAINLEVEL_OUTOF10: 7
PAINLEVEL_OUTOF10: 8
PAINLEVEL_OUTOF10: 9
PAINLEVEL_OUTOF10: 4
PAINLEVEL_OUTOF10: 3
PAINLEVEL_OUTOF10: 4
PAINLEVEL_OUTOF10: 8
PAINLEVEL_OUTOF10: 10
PAINLEVEL_OUTOF10: 7
PAINLEVEL_OUTOF10: 0
PAINLEVEL_OUTOF10: 3
PAINLEVEL_OUTOF10: 8
PAINLEVEL_OUTOF10: 7
PAINLEVEL_OUTOF10: 4
PAINLEVEL_OUTOF10: 7
PAINLEVEL_OUTOF10: 4
PAINLEVEL_OUTOF10: 4
PAINLEVEL_OUTOF10: 5
PAINLEVEL_OUTOF10: 3
PAINLEVEL_OUTOF10: 8
PAINLEVEL_OUTOF10: 3
PAINLEVEL_OUTOF10: 2
PAINLEVEL_OUTOF10: 8
PAINLEVEL_OUTOF10: 7
PAINLEVEL_OUTOF10: 10
PAINLEVEL_OUTOF10: 9
PAINLEVEL_OUTOF10: 7
PAINLEVEL_OUTOF10: 3
PAINLEVEL_OUTOF10: 7
PAINLEVEL_OUTOF10: 3
PAINLEVEL_OUTOF10: 7
PAINLEVEL_OUTOF10: 7
PAINLEVEL_OUTOF10: 2
PAINLEVEL_OUTOF10: 8
PAINLEVEL_OUTOF10: 7
PAINLEVEL_OUTOF10: 0
PAINLEVEL_OUTOF10: 6
PAINLEVEL_OUTOF10: 3
PAINLEVEL_OUTOF10: 7
PAINLEVEL_OUTOF10: 8
PAINLEVEL_OUTOF10: 6
PAINLEVEL_OUTOF10: 3
PAINLEVEL_OUTOF10: 4
PAINLEVEL_OUTOF10: 7
PAINLEVEL_OUTOF10: 0
PAINLEVEL_OUTOF10: 3
PAINLEVEL_OUTOF10: 4
PAINLEVEL_OUTOF10: 0
PAINLEVEL_OUTOF10: 7
PAINLEVEL_OUTOF10: 4
PAINLEVEL_OUTOF10: 2
PAINLEVEL_OUTOF10: 5
PAINLEVEL_OUTOF10: 8
PAINLEVEL_OUTOF10: 8
PAINLEVEL_OUTOF10: 3
PAINLEVEL_OUTOF10: 5
PAINLEVEL_OUTOF10: 0
PAINLEVEL_OUTOF10: 2
PAINLEVEL_OUTOF10: 2
PAINLEVEL_OUTOF10: 3
PAINLEVEL_OUTOF10: 8
PAINLEVEL_OUTOF10: 6
PAINLEVEL_OUTOF10: 9
PAINLEVEL_OUTOF10: 0
PAINLEVEL_OUTOF10: 6
PAINLEVEL_OUTOF10: 3
PAINLEVEL_OUTOF10: 2
PAINLEVEL_OUTOF10: 1
PAINLEVEL_OUTOF10: 6
PAINLEVEL_OUTOF10: 6
PAINLEVEL_OUTOF10: 0

## 2019-01-01 ASSESSMENT — PAIN DESCRIPTION - FREQUENCY
FREQUENCY: CONTINUOUS

## 2019-01-01 ASSESSMENT — PAIN DESCRIPTION - PAIN TYPE
TYPE: CHRONIC PAIN
TYPE: ACUTE PAIN
TYPE: CHRONIC PAIN

## 2019-01-01 ASSESSMENT — PAIN DESCRIPTION - ONSET
ONSET: ON-GOING

## 2019-01-01 ASSESSMENT — PULMONARY FUNCTION TESTS: FEV1/FVC: 38.97

## 2019-01-01 ASSESSMENT — PAIN - FUNCTIONAL ASSESSMENT
PAIN_FUNCTIONAL_ASSESSMENT: ACTIVITIES ARE NOT PREVENTED
PAIN_FUNCTIONAL_ASSESSMENT: PREVENTS OR INTERFERES SOME ACTIVE ACTIVITIES AND ADLS
PAIN_FUNCTIONAL_ASSESSMENT: ACTIVITIES ARE NOT PREVENTED
PAIN_FUNCTIONAL_ASSESSMENT: PREVENTS OR INTERFERES SOME ACTIVE ACTIVITIES AND ADLS

## 2019-01-01 ASSESSMENT — PAIN DESCRIPTION - DIRECTION: RADIATING_TOWARDS: NO

## 2019-01-02 ENCOUNTER — CARE COORDINATION (OUTPATIENT)
Dept: CASE MANAGEMENT | Age: 59
End: 2019-01-02

## 2019-01-10 RX ORDER — LEVOFLOXACIN 500 MG/1
500 TABLET, FILM COATED ORAL DAILY
COMMUNITY
End: 2019-01-23

## 2019-01-10 RX ORDER — ROFLUMILAST 500 UG/1
TABLET ORAL DAILY
COMMUNITY
End: 2019-01-23 | Stop reason: SDUPTHER

## 2019-01-10 RX ORDER — PREDNISONE 20 MG/1
20 TABLET ORAL DAILY
COMMUNITY
End: 2019-01-23

## 2019-01-10 RX ORDER — MIRTAZAPINE 30 MG/1
30 TABLET, FILM COATED ORAL NIGHTLY
COMMUNITY
End: 2019-01-01 | Stop reason: ALTCHOICE

## 2019-01-10 RX ORDER — VARENICLINE TARTRATE 1 MG/1
1 TABLET, FILM COATED ORAL 2 TIMES DAILY
COMMUNITY
End: 2019-01-23

## 2019-01-22 NOTE — PROGRESS NOTES
ZAKIYA Castellanos  [facility]   Respiratory Disease Clinic  [address] [place] [address]  Phone: [phone]  Fax: [phone]     Eulalio Gutierrez is a 58 y.o. male.   CC:   Chief Complaint   Patient presents with   • Shortness of Breath        HPI: He had frequent hospitalizations in 2018 due to COPD exacerbations secondary to heavy smoking.  He was last hospitalized at [facility] in December with severely elevated carboxyhemoglobin levels due to smoking.  After much counseling by multiple providers he finally quit smoking after that admission and is doing much better.  He is using smokeless tobacco but has not smoked any cigarettes since the beginning of December.  He comes in today for follow-up and to have spirometry checked and he has already had an improvement in FEV1 from 24% predicted to 32% predicted.  He continues to use oxygen for sleep and most hours of the day but is on it less than before he quit smoking.  He is on a daily regimen of Spiriva, Advair or due to long, albuterol nebs and Daliresp.  He is needing some samples of Daliresp and does have been provided to him today.  I have recommend that he consider a pulmonary rehabilitation program this spring and he said he will think about it.  He follows with Dr. Browning for routine medical care and he is up-to-date on flu and pneumonia vaccines.    The following portions of the patient's history were reviewed and updated as appropriate: allergies, current medications, past family history, past medical history, past social history, past surgical history and problem list.    Past Medical History:   Diagnosis Date   • Abdominal cramping    • Abnormal CT of the abdomen    • Abnormal weight loss    • Acute exacerbation of chronic obstructive airways disease (CMS/HCC)    • Benign colon polyp    • Chronic respiratory failure with hypoxia (CMS/HCC)    • Colon cancer (CMS/HCC)    • COPD (chronic obstructive pulmonary disease)  (CMS/[facility])    • COPD exacerbation (CMS/HCC)    • Diverticulosis of colon    • Family history of colonic polyps    • History of colon polyps    • Hyperlipidemia    • Hypertension    • Loose stools    • Nicotine dependence    • Palliative care patient    • Panlobular emphysema (CMS/HCC)    • Pneumonia    • Pneumonia of right lower lobe due to Pseudomonas species (CMS/HCC)    • S/P colonoscopic polypectomy    • Severe malnutrition (CMS/HCC)        History reviewed. No pertinent family history.    Social History     Socioeconomic History   • Marital status:      Spouse name: Not on file   • Number of children: Not on file   • Years of education: Not on file   • Highest education level: Not on file   Social Needs   • Financial resource strain: Not on file   • Food insecurity - worry: Not on file   • Food insecurity - inability: Not on file   • Transportation needs - medical: Not on file   • Transportation needs - non-medical: Not on file   Occupational History   • Not on file   Tobacco Use   • Smoking status: Former Smoker     Packs/day: 1.00     Years: 45.00     Pack years: 45.00     Types: Cigarettes     Last attempt to quit: 2018     Years since quittin.0   • Smokeless tobacco: Current User     Types: Snuff, Chew   Substance and Sexual Activity   • Alcohol use: No     Frequency: Never   • Drug use: Defer   • Sexual activity: Defer   Other Topics Concern   • Not on file   Social History Narrative   • Not on file       Review of Systems   Constitutional: Negative for appetite change, chills, fatigue and fever.   HENT: Negative for trouble swallowing and voice change.    Eyes: Negative for visual disturbance.   Respiratory: Positive for shortness of breath. Negative for cough and wheezing.    Cardiovascular: Negative for chest pain and leg swelling.   Gastrointestinal: Negative for abdominal distention, abdominal pain, nausea and vomiting.   Genitourinary: Negative.    Musculoskeletal: Negative for gait  "problem and myalgias.   Skin: Negative.    Neurological: Negative for weakness.   Hematological: Negative.    Psychiatric/Behavioral: The patient is not nervous/anxious.        /68   Pulse 95   Ht 175.3 cm (69\")   Wt 55.8 kg (123 lb)   SpO2 95% Comment: RA  BMI 18.16 kg/m²     Physical Exam   Constitutional: He is oriented to person, place, and time. He appears well-developed and well-nourished. No distress.   Thin   HENT:   Head: Normocephalic and atraumatic.   Eyes: Pupils are equal, round, and reactive to light. No scleral icterus.   Neck: Normal range of motion. Neck supple.   Cardiovascular: Normal rate, regular rhythm, S1 normal and S2 normal.   Pulmonary/Chest: Effort normal. No tachypnea. He has decreased breath sounds (At bases only). He has no wheezes. He has no rhonchi. He has no rales.   Abdominal: Soft. Bowel sounds are normal. He exhibits no distension.   Musculoskeletal: Normal range of motion. He exhibits no edema.   Lymphadenopathy:     He has no cervical adenopathy.   Neurological: He is alert and oriented to person, place, and time.   Skin: Skin is warm and dry. No rash noted. Nails show clubbing (Bilaterally).   Psychiatric: He has a normal mood and affect. His behavior is normal.   Vitals reviewed.      My interpretation of  Pulmonary Functions Testing: Spirometry shows a severe obstructive defect with a FEV1 of 32% predicted/1.09 L.  This is an improvement from spirometry from last year when the patient's FEV1 was at 24% predicted.  Actual FEV1/FVC is 38.46.  Mid flows are significantly decreased at 15% predicted but also improved from last year.  He is encouraged to continue with smoking cessation as he is already seeing an improvement in his lung function.    FEV1   Date Value Ref Range Status   01/23/2019 32% liters Final     FVC   Date Value Ref Range Status   01/23/2019 67% liters Final     FEV1/FVC   Date Value Ref Range Status   01/23/2019 38.46% % Final       Eulalio was seen " today for shortness of breath.    Diagnoses and all orders for this visit:    Stage 3 severe COPD by GOLD classification (CMS/[facility])  -     Pulmonary Function Test  -     roflumilast (DALIRESP) 500 MCG tablet tablet; Take 0.5 tablets by mouth Daily. Take 2 tabs daily    Bronchiectasis without complication (CMS/HCC)    Chronic respiratory failure with hypoxia (CMS/[facility])    Personal history of tobacco use, presenting hazards to health    Lung nodules    Centrilobular emphysema (CMS/[facility])      Patient's Body mass index is 18.16 kg/m². BMI is below normal parameters. Recommendations include: treating the underlying disease process.      Follow-up/Plan: Eulalio has finally quit smoking and has been applauded for those efforts!  Continue daily regimen of Spiriva, Advair/or dulera, albuterol nebs, Daliresp, pro-air and oxygen.  I recommend that he consider a pulmonary rehabilitation program this spring.  Follow-up in clinic in 4 months.    Parish Ellis, ZAKIYA  1/23/2019  3:06 PM

## 2019-01-23 ENCOUNTER — OFFICE VISIT (OUTPATIENT)
Dept: PULMONOLOGY | Facility: CLINIC | Age: 59
End: 2019-01-23

## 2019-01-23 VITALS
SYSTOLIC BLOOD PRESSURE: 122 MMHG | OXYGEN SATURATION: 95 % | BODY MASS INDEX: 18.22 KG/M2 | HEIGHT: 69 IN | DIASTOLIC BLOOD PRESSURE: 68 MMHG | WEIGHT: 123 LBS | HEART RATE: 95 BPM

## 2019-01-23 DIAGNOSIS — J47.9 BRONCHIECTASIS WITHOUT COMPLICATION (HCC): ICD-10-CM

## 2019-01-23 DIAGNOSIS — Z87.891 PERSONAL HISTORY OF TOBACCO USE, PRESENTING HAZARDS TO HEALTH: ICD-10-CM

## 2019-01-23 DIAGNOSIS — J44.9 STAGE 3 SEVERE COPD BY GOLD CLASSIFICATION (HCC): Primary | ICD-10-CM

## 2019-01-23 DIAGNOSIS — R91.8 LUNG NODULES: ICD-10-CM

## 2019-01-23 DIAGNOSIS — J96.11 CHRONIC RESPIRATORY FAILURE WITH HYPOXIA (HCC): ICD-10-CM

## 2019-01-23 DIAGNOSIS — J43.2 CENTRILOBULAR EMPHYSEMA (HCC): ICD-10-CM

## 2019-01-23 LAB
FEV1/FVC: NORMAL %
FEV1: NORMAL LITERS
FVC VOL RESPIRATORY: NORMAL LITERS

## 2019-01-23 PROCEDURE — 94010 BREATHING CAPACITY TEST: CPT | Performed by: NURSE PRACTITIONER

## 2019-01-23 PROCEDURE — 99214 OFFICE O/P EST MOD 30 MIN: CPT | Performed by: NURSE PRACTITIONER

## 2019-01-23 RX ORDER — ALBUTEROL SULFATE 90 UG/1
AEROSOL, METERED RESPIRATORY (INHALATION)
COMMUNITY
Start: 2018-12-05 | End: 2019-01-01 | Stop reason: ALTCHOICE

## 2019-01-23 RX ORDER — ROFLUMILAST 500 UG/1
250 TABLET ORAL DAILY
Qty: 30 TABLET | Refills: 0 | COMMUNITY
Start: 2019-01-23 | End: 2019-01-01 | Stop reason: SDUPTHER

## 2019-01-25 ENCOUNTER — CARE COORDINATION (OUTPATIENT)
Dept: CASE MANAGEMENT | Age: 59
End: 2019-01-25

## 2019-03-11 NOTE — TELEPHONE ENCOUNTER
I have been unable to reach this patient by phone. Patient called wanting to have 3 scripts for his Spiriva 2.5, Advair 230/21, and Proair inhaler ready to be picked up and take to [facility].     Please approve these and they will print and need to be sign.   I will call patient to pick them up when they are ready.

## 2019-03-25 NOTE — TELEPHONE ENCOUNTER
[facility] called and was needing the ventolin and the one we gave him was for Proair.

## 2019-03-26 NOTE — TELEPHONE ENCOUNTER
PATIENT CALLED AND WAS WONDERING IF YOU HAVE FILLED OUT THE PAPERS FOR HIS SPIRIVA RESPIMAT INHALER. THANK YOU.

## 2019-05-23 PROBLEM — J43.2 CENTRILOBULAR EMPHYSEMA (HCC): Status: ACTIVE | Noted: 2019-01-01

## 2019-05-23 PROBLEM — R91.8 LUNG NODULES: Status: ACTIVE | Noted: 2019-01-01

## 2019-05-23 PROBLEM — J47.9 BRONCHIECTASIS WITHOUT COMPLICATION (HCC): Status: ACTIVE | Noted: 2019-01-01

## 2019-05-23 PROBLEM — J96.11 CHRONIC RESPIRATORY FAILURE WITH HYPOXIA (HCC): Status: ACTIVE | Noted: 2019-01-01

## 2019-05-23 PROBLEM — J44.9 STAGE 3 SEVERE COPD BY GOLD CLASSIFICATION (HCC): Status: ACTIVE | Noted: 2019-01-01

## 2019-05-23 PROBLEM — Z87.891 PERSONAL HISTORY OF TOBACCO USE, PRESENTING HAZARDS TO HEALTH: Status: ACTIVE | Noted: 2019-01-01

## 2019-05-23 NOTE — PROGRESS NOTES
ZAKIYA Castellanos  [facility]   Respiratory Disease Clinic  [address] [place] [address]  Phone: [phone]  Fax: [phone]     Eulalio Gutierrez is a 58 y.o. male.   CC:   Chief Complaint   Patient presents with   • Shortness of Breath   • Cough        HPI: He has been coughing up some colored sputum over the last few weeks, this is worse in the morning.  He stopped smoking in December 2018 but continues to use smokeless tobacco.  He has a history of severe COPD, bronchiectasis, chronic hypoxemic respiratory failure, emphysema and lung nodules.  He is on a daily regimen of Spiriva, Advair, albuterol nebs that he does 2-3 times a day and chronic oxygen therapy.  He sees Dr. Browning for routine medical care and is up-to-date on flu and pneumonia vaccines.  He has done much better since he quit smoking in December and has not had any hospitalizations.    The following portions of the patient's history were reviewed and updated as appropriate: allergies, current medications, past family history, past medical history, past social history, past surgical history and problem list.    Past Medical History:   Diagnosis Date   • Abdominal cramping    • Abnormal CT of the abdomen    • Abnormal weight loss    • Acute exacerbation of chronic obstructive airways disease (CMS/HCC)    • Benign colon polyp    • Chronic respiratory failure with hypoxia (CMS/HCC)    • Colon cancer (CMS/HCC)    • COPD (chronic obstructive pulmonary disease) (CMS/HCC)    • COPD exacerbation (CMS/HCC)    • Diverticulosis of colon    • Family history of colonic polyps    • History of colon polyps    • Hyperlipidemia    • Hypertension    • Loose stools    • Nicotine dependence    • Palliative care patient    • Panlobular emphysema (CMS/HCC)    • Pneumonia    • Pneumonia of right lower lobe due to Pseudomonas species (CMS/HCC)    • S/P colonoscopic polypectomy    • Severe malnutrition (CMS/HCC)        History reviewed.  "No pertinent family history.    Social History     Socioeconomic History   • Marital status:      Spouse name: Not on file   • Number of children: Not on file   • Years of education: Not on file   • Highest education level: Not on file   Tobacco Use   • Smoking status: Former Smoker     Packs/day: 1.00     Years: 45.00     Pack years: 45.00     Types: Cigarettes     Last attempt to quit: 2018     Years since quittin.3   • Smokeless tobacco: Current User     Types: Snuff, Chew   Substance and Sexual Activity   • Alcohol use: No     Frequency: Never   • Drug use: Defer   • Sexual activity: Defer       Review of Systems   Constitutional: Positive for appetite change (Poor appetite, daily). Negative for chills, fatigue and fever.   HENT: Negative for trouble swallowing and voice change.    Eyes: Negative for visual disturbance.   Respiratory: Positive for cough (Productive) and shortness of breath. Negative for wheezing.    Cardiovascular: Negative for chest pain and leg swelling.   Gastrointestinal: Negative for abdominal distention, abdominal pain, nausea and vomiting.   Genitourinary: Negative.    Musculoskeletal: Negative for gait problem and myalgias.   Skin: Negative.    Neurological: Negative for weakness.   Hematological: Negative.    Psychiatric/Behavioral: The patient is not nervous/anxious.        /60   Pulse 112   Ht 175.3 cm (69\")   Wt 53.1 kg (117 lb)   SpO2 93% Comment: 2l  BMI 17.28 kg/m²     Physical Exam  Constitutional: He is oriented to person, place, and time. He appears well-developed and well-nourished. No distress.   Thin   HENT: He has portable oxygen with him, but is not wearing it during the exam.  Head: Normocephalic and atraumatic.   Eyes: Pupils are equal, round, and reactive to light. No scleral icterus.   Neck: Normal range of motion. Neck supple.   Cardiovascular: Normal rate, regular rhythm, S1 normal and S2 normal.   Pulmonary/Chest: Effort normal. No tachypnea. " He has decreased breath sounds at the bases.  Some scattered rhonchi, greater on the left.  He has no wheezes. He has no rales.   Abdominal: Soft. Bowel sounds are normal. He exhibits no distension.   Musculoskeletal: Normal range of motion. He exhibits no edema.   Lymphadenopathy:     He has no cervical adenopathy.   Neurological: He is alert and oriented to person, place, and time.   Skin: Skin is warm and dry. No rash noted. Nails show mild clubbing (Bilaterally).   Psychiatric: He has a normal mood and affect. His behavior is normal.   Vitals reviewed.      Eulalio was seen today for shortness of breath and cough.    Diagnoses and all orders for this visit:    Stage 3 severe COPD by GOLD classification (CMS/[facility])  -     XR Chest 2 View; Future  -     methylPREDNISolone acetate (DEPO-medrol) injection 80 mg    Bronchiectasis without complication (CMS/[facility])  -     XR Chest 2 View; Future  -     methylPREDNISolone acetate (DEPO-medrol) injection 80 mg  -     levoFLOXacin (LEVAQUIN) 500 MG tablet; Take 1 tablet by mouth Daily for 7 days.    Chronic respiratory failure with hypoxia (CMS/[facility])    Personal history of tobacco use, presenting hazards to health  -     XR Chest 2 View; Future    Lung nodules  -     XR Chest 2 View; Future    Centrilobular emphysema (CMS/[facility])  -     XR Chest 2 View; Future      Patient's Body mass index is 17.28 kg/m². BMI is within normal parameters. No follow-up required..      Follow-up/Plan: He has received a Depo-Medrol shot in the office today.  7 days of Levaquin has been prescribed to his pharmacy.  Continue on oxygen, Spiriva, Advair and albuterol nebs.  He gets most of his medication through patient assistance and all of the needed paperwork was recently signed.  Return to clinic in 6 months with a chest x-ray to be done at [facility] just prior.    Parish Ellis, APRN  5/24/2019  11:18 AM

## 2019-05-24 NOTE — PATIENT INSTRUCTIONS
Methylprednisolone Suspension for Injection  What is this medicine?  METHYLPREDNISOLONE (meth ill pred NISS oh lone) is a corticosteroid. It is commonly used to treat inflammation of the skin, joints, lungs, and other organs. Common conditions treated include asthma, allergies, and arthritis. It is also used for other conditions, such as blood disorders and diseases of the adrenal glands.  This medicine may be used for other purposes; ask your health care provider or pharmacist if you have questions.  COMMON BRAND NAME(S): Depmedalone-40, Depmedalone-80, Depo-Medrol  What should I tell my health care provider before I take this medicine?  They need to know if you have any of these conditions:  -Cushing's syndrome  -eye disease, vision problems  -diabetes  -glaucoma  -heart disease  -high blood pressure  -infection (especially a virus infection such as chickenpox, cold sores, or herpes)  -liver disease  -mental illness  -myasthenia gravis  -osteoporosis  -recently received or scheduled to receive a vaccine  -seizures  -stomach or intestine problems  -thyroid disease  -an unusual or allergic reaction to lactose, methylprednisolone, other medicines, foods, dyes, or preservatives  -pregnant or trying to get pregnant  -breast-feeding  How should I use this medicine?  This medicine is for injection into a muscle, joint, or other tissue. It is given by a health care professional in a hospital or clinic setting.  Talk to your pediatrician regarding the use of this medicine in children. While this drug may be prescribed for selected conditions, precautions do apply.  Overdosage: If you think you have taken too much of this medicine contact a poison control center or emergency room at once.  NOTE: This medicine is only for you. Do not share this medicine with others.  What if I miss a dose?  This does not apply.  What may interact with this medicine?  Do not take this medicine with any of the following  medications:  -alefacept  -echinacea  -iopamidol  -live virus vaccines  -metyrapone  -mifepristone  This medicine may also interact with the following medications:  -amphotericin B  -aspirin and aspirin-like medicines  -certain antibiotics like erythromycin, clarithromycin, troleandomycin  -certain medicines for diabetes  -certain medicines for fungal infections like ketoconazole  -certain medicines for seizures like carbamazepine, phenobarbital, phenytoin  -certain medicines that treat or prevent blood clots like warfarin  -cyclosporine  -digoxin  -diuretics  -female hormones, like estrogens and birth control pills  -isoniazid  -NSAIDs, medicines for pain inflammation, like ibuprofen or naproxen  -other medicines for myasthenia gravis  -rifampin  -vaccines  This list may not describe all possible interactions. Give your health care provider a list of all the medicines, herbs, non-prescription drugs, or dietary supplements you use. Also tell them if you smoke, drink alcohol, or use illegal drugs. Some items may interact with your medicine.  What should I watch for while using this medicine?  Tell your doctor or healthcare professional if your symptoms do not start to get better or if they get worse. Do not stop taking except on your doctor's advice. You may develop a severe reaction. Your doctor will tell you how much medicine to take.  Your condition will be monitored carefully while you are receiving this medicine.  This medicine may increase your risk of getting an infection. Tell your doctor or health care professional if you are around anyone with measles or chickenpox, or if you develop sores or blisters that do not heal properly.  This medicine may affect blood sugar levels. If you have diabetes, check with your doctor or health care professional before you change your diet or the dose of your diabetic medicine.  Tell your doctor or health care professional right away if you have any change in your  eyesight.  Using this medicine for a long time may increase your risk of low bone mass. Talk to your doctor about bone health.  What side effects may I notice from receiving this medicine?  Side effects that you should report to your doctor or health care professional as soon as possible:  -allergic reactions like skin rash, itching or hives, swelling of the face, lips, or tongue  -bloody or tarry stools  -changes in vision  -hallucination, loss of contact with reality  -muscle cramps  -muscle pain  -palpitations  -signs and symptoms of high blood sugar such as dizziness; dry mouth; dry skin; fruity breath; nausea; stomach pain; increased hunger or thirst; increased urination  -signs and symptoms of infection like fever or chills; cough; sore throat; pain or trouble passing urine  -trouble passing urine or change in the amount of urine  Side effects that usually do not require medical attention (report to your doctor or health care professional if they continue or are bothersome):  -changes in emotions or mood  -constipation  -diarrhea  -excessive hair growth on the face or body  -headache  -nausea, vomiting  -pain, redness, or irritation at site where injected  -trouble sleeping  -weight gain  This list may not describe all possible side effects. Call your doctor for medical advice about side effects. You may report side effects to FDA at 3-666-FDA-5259.  Where should I keep my medicine?  This drug is given in a hospital or clinic and will not be stored at home.  NOTE: This sheet is a summary. It may not cover all possible information. If you have questions about this medicine, talk to your doctor, pharmacist, or health care provider.  © 2019 [employer] (2017-02-23 16:17:02)

## 2019-08-21 NOTE — TELEPHONE ENCOUNTER
[facility] forwarded the assessment on this patient to the anesthesiologist. They want the patient seen before they will do his colonoscopy. They are cancelling the one Friday and will reschedule it after you see him. Do you want him to have any testing for that appointment?

## 2019-08-22 NOTE — TELEPHONE ENCOUNTER
Appointment give to patient for 8/30/19 at 11:00 with Parish. Patient will contact [facility] Hylton to let them know when his appt will be.

## 2019-08-29 NOTE — PROGRESS NOTES
" ZAKIYA Castellanos  [facility]   Respiratory Disease Clinic  [address] [place] [address]  Phone: [phone]  Fax: [phone]     Eulalio Gutierrez is a 59 y.o. male.   CC:   Chief Complaint   Patient presents with   • Pre-op Evaluation        HPI: Eulalio comes in today to have updated PFTs done per the request of his gastroenterologist for consideration of colonoscopy.  He tells me that he continues to have unexplained weight loss and is now having a lot of generalized weakness, particularly to his legs.  He is adamant that he has not smoked since he quit this past December (8 months ago) but wants to know if \"swallowing tobacco juice would cause weight loss\"?  He has been encouraged to stop all forms of tobacco use.  He reports that his COPD is currently exacerbated and he is having increased shortness of breath and a productive cough over the last several weeks.  He has a history of severe to very severe COPD, bronchiectasis, emphysema, lung nodules and chronic hypoxemic respiratory failure.  He is on maximum treatment for his COPD.  Updated spirometry has been done today that shows a decrease in the patient's FEV1 to 27% predicted or 0.92 L this is down slightly from his lung function earlier this year in January.  The patient, in my opinion would be a poor candidate for any type of procedure or testing that requires sedation at this point.  I recommend that Eulalio ask his gastroenterologist about doing the Cologuard test in lieu of a colonoscopy.  He is followed by Dr. Browning for routine medical care and he stays up-to-date on flu and pneumonia vaccines.    The following portions of the patient's history were reviewed and updated as appropriate: allergies, current medications, past family history, past medical history, past social history, past surgical history and problem list.    Past Medical History:   Diagnosis Date   • Abdominal cramping    • Abnormal CT of the abdomen "    • Abnormal weight loss    • Acute exacerbation of chronic obstructive airways disease (CMS/HCC)    • Benign colon polyp    • Chronic respiratory failure with hypoxia (CMS/HCC)    • Colon cancer (CMS/HCC)    • COPD (chronic obstructive pulmonary disease) (CMS/HCC)    • COPD exacerbation (CMS/HCC)    • Diverticulosis of colon    • Family history of colonic polyps    • History of colon polyps    • Hyperlipidemia    • Hypertension    • Loose stools    • Nicotine dependence    • Palliative care patient    • Panlobular emphysema (CMS/HCC)    • Pneumonia    • Pneumonia of right lower lobe due to Pseudomonas species (CMS/HCC)    • S/P colonoscopic polypectomy    • Severe malnutrition (CMS/HCC)        History reviewed. No pertinent family history.    Social History     Socioeconomic History   • Marital status:      Spouse name: Not on file   • Number of children: Not on file   • Years of education: Not on file   • Highest education level: Not on file   Tobacco Use   • Smoking status: Former Smoker     Packs/day: 1.00     Years: 45.00     Pack years: 45.00     Types: Cigarettes     Last attempt to quit: 2018     Years since quittin.6   • Smokeless tobacco: Current User     Types: Snuff, Chew   Substance and Sexual Activity   • Alcohol use: No     Frequency: Never   • Drug use: Defer   • Sexual activity: Defer       Review of Systems   Constitutional: Positive for unexpected weight loss. Negative for appetite change, chills, fatigue and fever.   HENT: Negative for swollen glands, trouble swallowing and voice change.    Eyes: Negative for visual disturbance.   Respiratory: Positive for cough and shortness of breath. Negative for wheezing.    Cardiovascular: Negative for chest pain and leg swelling.   Gastrointestinal: Negative for abdominal distention, abdominal pain, nausea and vomiting.   Genitourinary: Negative.    Musculoskeletal: Negative for gait problem and myalgias.   Skin: Negative.    Neurological:  "Positive for weakness.   Hematological: Negative.    Psychiatric/Behavioral: The patient is not nervous/anxious.        /70   Pulse 99   Ht 175.3 cm (69\")   Wt 47.6 kg (105 lb)   SpO2 94% Comment: 2L  BMI 15.51 kg/m²     Physical Exam  Constitutional: He is oriented to person, place, and time. He appears well-developed and well-nourished. No distress.   Thin, chronically ill-appearing.  HENT: Wearing portable oxygen  Head: Normocephalic and atraumatic.   Eyes: Pupils are equal, round, and reactive to light. No scleral icterus.   Neck: Normal range of motion. Neck supple.   Cardiovascular: Normal rate, regular rhythm, S1 normal and S2 normal.   Pulmonary/Chest: Effort normal. No tachypnea. He has decreased breath sounds at the bases. He has no wheezes. He has no rales.   Abdominal: Soft. Bowel sounds are normal. He exhibits no distension.   Musculoskeletal: Normal range of motion. He exhibits no edema.   Lymphadenopathy:     He has no cervical adenopathy.   Neurological: He is alert and oriented to person, place, and time.   Skin: Skin is warm and dry. No rash noted. Nails show mild clubbing (Bilaterally).   Psychiatric: He has a normal mood and affect. His behavior is normal.   Vitals reviewed.    My interpretation of  Pulmonary Functions Testing: Spirometry shows a very severe obstructive defect with FEV1 of 27% predicted or 0.92 L.  Actual FEV1/FVC is 38.97.  The patient has very severe, stage IV COPD.    FEV1   Date Value Ref Range Status   08/30/2019 27% liters Final     FVC   Date Value Ref Range Status   08/30/2019 56% liters Final     FEV1/FVC   Date Value Ref Range Status   08/30/2019 38.97 % Final       Eulalio was seen today for pre-op evaluation.    Diagnoses and all orders for this visit:    Stage 3 severe COPD by GOLD classification (CMS/[facility])  -     Pulmonary Function Test    Bronchiectasis without complication (CMS/[facility])    Chronic respiratory failure with hypoxia (CMS/[facility])    Lung " nodules    Centrilobular emphysema (CMS/HCC)    Former smoker    COPD with acute exacerbation (CMS/HCC)  -     methylPREDNISolone acetate (DEPO-medrol) injection 80 mg  -     doxycycline (VIBRAMYCIN) 100 MG capsule; Take 1 capsule by mouth 2 (Two) Times a Day.    Underweight      Patient's Body mass index is 15.51 kg/m². BMI is below normal parameters. Recommendations include: treating the underlying disease process.      Follow-up/Plan: Continue on current pulmonary regimen of Spiriva, Advair, albuterol nebs and Daliresp.  The patient is on chronic oxygen therapy.  He has been counseled on the cessation of all forms of tobacco including smokeless.  He received a Depo-Medrol injection in the office today and 10 days of doxycycline has been prescribed for the patient.  The patient is inquiring about chronic low dose prednisone therapy and I told him that it is not ideal but we could consider trying him on a daily dose to see if it helps his daily symptoms of shortness of breath and possibly even stimulate his appetite.    From a pulmonary standpoint, this patient would be considered a high risk for postoperative pulmonary complications based on the severity, (and current worsening) of his COPD as well as the patient's chronic hypoxemic respiratory failure and bronchiectasis.  The patient does not feel that he is even functional enough to undergo the necessary prep for a colonoscopy.  If doing Cologuard testing is not contraindicated this might be an option in lieu of colonoscopy.    Parish Ellis, ZAKIYA  8/30/2019  2:29 PM

## 2019-08-30 NOTE — PATIENT INSTRUCTIONS
I will recommend the Cologuard test for you instead of undergoing colonoscopy due to your poor lung function.

## 2019-08-30 NOTE — TELEPHONE ENCOUNTER
Please send my note from today to [facility] to the attention of Dr. Osei.  You can look under letters for this patient because I sent him a letter at the beginning of the month and this is a follow-up regarding that letter.  Thank you.

## 2019-09-03 NOTE — TELEPHONE ENCOUNTER
Patient called and was told to follow up Tuesday after receiving depo shot and stated that you wanted him on prednisone but wasn't sure how much and with how many refills. Are you wanting him on the prednisone daily?

## 2019-09-03 NOTE — TELEPHONE ENCOUNTER
Actually the patient had inquired about being on daily prednisone.  Tell him I him calling him in 20 mg tablets to his pharmacy and he should take them as prescribed until I see him back this fall.  Thank you.

## 2019-09-09 NOTE — TELEPHONE ENCOUNTER
I have called to let the patient know that Sae Riojas wants him to have a BE and an UGI with SBFT. I let him know that it is set for Friday, Sept 13th at 9:30 am at the hospital.  He has to have all liquids starting at 5 pm the night before and NPO after midnight.   JU

## 2019-09-16 NOTE — TELEPHONE ENCOUNTER
I have reviewed this and CT scan of chest ordered as recommended. msg sent to Kalyan Whitlock and I talked to her about what to tell this patient.

## 2019-09-20 NOTE — TELEPHONE ENCOUNTER
Patient called and said that his colon doctor called and said nodules have gotten bigger from the Ct Scan that was done. Patient stated that the office said they have already spoken with you about this and stated that you didn't have any concern with them. He stated they would send the results of the test to us. Patient wanted to know a little more information about the larger nodules.

## 2019-09-20 NOTE — TELEPHONE ENCOUNTER
Marian, please call Eulalio back and let him know that we will review the scans sometime over the weekend and get back to him Monday or Tuesday regarding this.  In the immediate, there is nothing that he needs to do.  Thank you.

## 2019-09-27 NOTE — TELEPHONE ENCOUNTER
Patient called and was wondering about results. Also, patient mentioned that he is coughing up dark grey.

## 2019-09-29 NOTE — TELEPHONE ENCOUNTER
Call him and tell him there are no results back so far. RX for Levaquin has been sent into pharmacy.

## 2019-10-03 NOTE — TELEPHONE ENCOUNTER
Yvonne from [facility] lab called and culture did come back positive and was detected for Aspergillus. She is faxing over a report. Going to notify patient what would you like for me to tell him and how to treat patient will be asking more information on it.

## 2019-10-07 NOTE — TELEPHONE ENCOUNTER
Patients breathing hasn't gotten any better even after the levaquin. Patient is wondering if he can have something for the fungus. Patient isn't able to go out in public or go to the doctor because he is so short of breath. How would you like to proceed?

## 2019-10-07 NOTE — TELEPHONE ENCOUNTER
Waiting on them to review and for a call back to [facility]. Patient Notified.

## 2019-10-07 NOTE — TELEPHONE ENCOUNTER
He is being referred to Infectious Disease to discuss treatment of the fungus. If his breathing is so bad that he cannot leave his house, he needs to go to the ER or call Dr. Guzmán about getting admitted to the hospital. If he feels he cannot drive, he needs to call 911.     Please check with Shayy and make sure the Infect. Disease referral has been made. Thanks.

## 2019-10-31 NOTE — TELEPHONE ENCOUNTER
Patient saw Dr. Giang today. They have stopped his treatment for possible aspergillos because he had a positive AFB. He was started on Rifampin, Ethambutol.  Dr. Giang is asking if you could see him next week? His follow up appt is at the end of November.

## 2019-10-31 NOTE — TELEPHONE ENCOUNTER
He will have to come in on Tuesday.  My schedule is booked on Wednesday and then I am out for the rest of the week.    Also, will you please contact Dr. Giang's office and have them send me his last office note with Eulalio. Thank you.

## 2019-11-03 NOTE — PROGRESS NOTES
ZAKIYA Castellanos  [facility]   Respiratory Disease Clinic  [address] [place] [address]  Phone: [phone]  Fax: [phone]     Eulalio Gutierrez is a 59 y.o. male.   CC:   Chief Complaint   Patient presents with   • COPD        HPI: Some recent labs indicated a possible aspergillus infection so he was referred to infectious disease.  He has been seen by Dr. Giang and he was started on voriconazole but did not feel any better.  At this point, Dr. Henriquez feels that Aspergillus is less likely given that fungus cultures are without growth and his most recent sputum is without growth.  He has a high suspicion towards non-tuberculosis mycobacterium.  He is currently on rifampin, azithromycin and ethambutol.  He has end-stage COPD, bronchiectasis, emphysema, lung nodules, chronic respiratory failure and is a former longtime smoker who quit in 2018.  He has had issues with ongoing weight loss but that is starting to improve. He reports that his appetite is finally improving. He has declined a referral to a dietician.   He requires patient assistance for his medications because he does not have prescription coverage and is on daily Spiriva, Advair, albuterol nebs, Daliresp and chronic oxygen therapy.  He sees Dr. Browning for routine medical care.  He will receive his updated flu shot in the office today.  He is up-to-date on pneumonia vaccines.    The following portions of the patient's history were reviewed and updated as appropriate: allergies, current medications, past family history, past medical history, past social history, past surgical history and problem list.    Past Medical History:   Diagnosis Date   • Abdominal cramping    • Abnormal CT of the abdomen    • Abnormal weight loss    • Acute exacerbation of chronic obstructive airways disease (CMS/HCC)    • Benign colon polyp    • Chronic respiratory failure with hypoxia (CMS/HCC)    • Colon cancer (CMS/HCC)    • COPD (chronic  obstructive pulmonary disease) (CMS/HCC)    • COPD exacerbation (CMS/HCC)    • Diverticulosis of colon    • Family history of colonic polyps    • History of colon polyps    • Hyperlipidemia    • Hypertension    • Loose stools    • Nicotine dependence    • Palliative care patient    • Panlobular emphysema (CMS/HCC)    • Pneumonia    • Pneumonia of right lower lobe due to Pseudomonas species (CMS/HCC)    • S/P colonoscopic polypectomy    • Severe malnutrition (CMS/HCC)        History reviewed. No pertinent family history.    Social History     Socioeconomic History   • Marital status:      Spouse name: Not on file   • Number of children: Not on file   • Years of education: Not on file   • Highest education level: Not on file   Tobacco Use   • Smoking status: Former Smoker     Packs/day: 1.00     Years: 45.00     Pack years: 45.00     Types: Cigarettes     Last attempt to quit: 2018     Years since quittin.8   • Smokeless tobacco: Current User     Types: Snuff, Chew   Substance and Sexual Activity   • Alcohol use: No     Frequency: Never   • Drug use: Defer   • Sexual activity: Defer       Review of Systems   Constitutional: Positive for fatigue and unexpected weight loss (Starting to improve). Negative for appetite change, chills and fever.   HENT: Positive for postnasal drip and sore throat. Negative for swollen glands, trouble swallowing and voice change.    Eyes: Negative for visual disturbance.   Respiratory: Positive for shortness of breath. Negative for cough and wheezing.    Cardiovascular: Negative for chest pain and leg swelling.   Gastrointestinal: Negative for abdominal distention, abdominal pain, nausea and vomiting.   Genitourinary: Negative.    Musculoskeletal: Negative for gait problem and myalgias.   Skin: Negative.    Neurological: Negative for weakness.   Hematological: Negative.    Psychiatric/Behavioral: Positive for depressed mood. The patient is nervous/anxious.        /90    "Pulse (!) 121   Ht 175.3 cm (69\")   Wt 50.4 kg (111 lb 3.2 oz)   SpO2 94% Comment: 2 liters  BMI 16.42 kg/m²     Physical Exam   Constitutional: He is oriented to person, place, and time. He appears well-developed and well-nourished. He appears cachectic. He appears ill (chronically). No distress. Nasal cannula in place.   HENT:   Head: Normocephalic and atraumatic.   Mouth/Throat: Posterior oropharyngeal erythema present.   Eyes: Pupils are equal, round, and reactive to light. No scleral icterus.   Neck: Normal range of motion. Neck supple.   Cardiovascular: Normal rate, regular rhythm, S1 normal and S2 normal.   Pulmonary/Chest: Effort normal. No tachypnea. He has decreased breath sounds. He has no wheezes. He has no rhonchi. He has no rales.   Short of breath at rest   Abdominal: Soft. Bowel sounds are normal. He exhibits no distension.   Musculoskeletal: Normal range of motion. He exhibits no edema.   Lymphadenopathy:     He has cervical adenopathy (mild, anterioir cervical).   Neurological: He is alert and oriented to person, place, and time.   Skin: Skin is warm and dry. No rash noted. No cyanosis. Nails show clubbing (mild).   Psychiatric: His behavior is normal. His mood appears anxious. He exhibits a depressed mood.   Flat affect   Vitals reviewed.      No notes on file    Eulalio was seen today for copd.    Diagnoses and all orders for this visit:    Stage 3 severe COPD by GOLD classification (CMS/HCC)    Bronchiectasis without complication (CMS/HCC)    Centrilobular emphysema (CMS/HCC)    Cavitary lung disease    Chronic respiratory failure with hypoxia (CMS/HCC)    Lung nodules    Former smoker    Smokeless tobacco use    Underweight    Other orders  -     Flucelvax Quad=>4Years (PFS)      Patient's Body mass index is 16.42 kg/m². BMI is below normal parameters. Recommendations include: referral to dietitian, referral to primary care and treating the underlying disease process.      Follow-up/Plan: " Continue current COPD regimen with Spiriva, Advair, albuterol nebs, Daliresp and oxygen.  Continue on triple therapy per Dr. Giang with rifampin, ethambutol and azithromycin.  Return to clinic in 3 months.  We will get a follow-up chest x-ray in early spring and the patient would like to start having his scans done at [facility].    His blood pressure was elevated today which he attributes to feeling anxious.  It was rechecked before he left and was starting to get closer to his baseline.    Parish Ellis, APRN  11/5/2019  11:00 AM

## 2019-11-05 NOTE — PATIENT INSTRUCTIONS
Mycobacterium Avium Complex  Mycobacterium avium complex (MAC) is an infection caused by two similar and very common types of bacteria. MAC causes two types of infection:  · Local infection. This is limited to one area. If you have a normal disease-fighting system (immune system), you are more likely to get this type of infection.  · Disseminated infection. This is an infection that affects all parts of the body. It is more serious than a local infection. You are more likely to get this infection if you have a weak immune system.  MAC infections most often affect the lungs. MAC is not contagious. This means that it does not spread from person to person.  What are the causes?  This condition is caused by two kinds of bacteria, Mycobacterium avium and Mycobacterium intracellulare. These bacteria are often found in drinking water, hot tubs, swimming pools, house dust, and animals. You may become infected from:  · Breathing in water particles or dust particles that contain the bacteria (contaminated).  · Eating or drinking something that is contaminated.  · Drinking milk. MAC bacteria can grow in pasteurized milk.  What increases the risk?  The following factors may make you more likely to develop this condition:  · Having HIV or AIDS.  · Being a child.  · Being an older woman.  · Smoking cigarettes.  · Having long-term (chronic) lung diseases, such as:  ? Tuberculosis.  ? Chronic obstructive pulmonary disease (COPD).  ? Lung cancer.  ? Cystic fibrosis.  What are the signs or symptoms?  Symptoms vary depending on the type of MAC infection you have.  Symptoms of a local infection  · Lymph nodes that are bigger than usual (enlarged). Enlarged lymph nodes may be:  ? On one side of the neck.  ? Under the jaw.  ? Around the ear.  · Fatigue.  · Shortness of breath.  · A long-term cough that produces lots of mucus.  · Abnormal sounds when breathing. A health care provider may hear this when listening to your lungs.  Symptoms  of a disseminated infection  · Fever.  · Night sweats.  · Weight loss.  · Loss of appetite.  · Fatigue.  How is this diagnosed?  This condition may be diagnosed based on:  · Your symptoms.  · A physical exam.  · Your medical history.  You may also have tests, including:  · Chest X-ray or CT scan.  · Blood tests.  · Test of mucus from your lungs (sputum) or other body fluids to see whether bacteria will grow (culture).  · Removal of a piece of body tissue to be checked under a microscope (biopsy).  How is this treated?  Treatment for this condition depends on the type of infection. Treatment may include:  · Taking antibiotic medicines. You may have to take antibiotics until MAC bacteria have stopped growing in cultures.  · Surgery to remove infected lymph nodes. After the lymph nodes are removed, antibiotics are usually not needed.  In some people with a disseminated infection, treatment with antibiotic medicines may continue for several years.  Follow these instructions at home:  Medicines    · Take over-the-counter and prescription medicines only as told by your health care provider.  · Take your antibiotic medicine as told by your health care provider. Do not stop taking the antibiotic even if you start to feel better.  Eating and drinking    · Eat a healthy, well-balanced diet. Talk with your health care provider or a diet and nutrition specialist (dietitian) about what food choices are best for you.  · Drink enough fluid to keep your urine pale yellow.  General instructions  · Seek medical care for any underlying health conditions you have. Follow your health care provider's instructions about how to manage those conditions.  · Do not use any products that contain nicotine or tobacco, such as cigarettes and e-cigarettes. If you need help quitting, ask your health care provider.  · Keep all follow-up visits as told by your health care provider. This is important.  How is this prevented?    · Stay up-to-date on  your vaccines.  · Take all of your medicines as told by your health care provider, especially if you have problems with your immune system.  · Avoid drinking water that may not be clean. Lakes, rivers, and other open water sources can contain germs that cause infections.  · Wash your hands often with soap and water. If soap and water are not available, use hand .  · Practice safe food preparation, especially if your immune system is weak because of an underlying illness.  ? Store foods at safe temperatures.  ? Avoid eating raw or undercooked meats, poultry, fish, shellfish, and eggs.  ? Wash and peel fruits and vegetables before eating or cooking them.  ? Use separate food preparation surfaces and storage spaces for raw meat and for fruits and vegetables.  ? Wash your hands, food preparation surfaces, and utensils thoroughly before and after you handle raw foods.  Contact a health care provider if you:  · Have chills or a fever.  · Have a cough that does not go away.  · Have loss of appetite or weight loss.  · Feel very tired.  Get help right away if you:  · Have a fever for more than 2-3 days.  · Cough up blood.  · Have trouble breathing.  · Have chest pain.  Summary  · Mycobacterium avium complex (MAC) is an infection that is caused by two similar and very common types of bacteria.  · If you have a normal disease-fighting system (immune system), you are more likely to get an infection that is limited to one area of your body (local infection).  · If you have a weak immune system, you are likely to get infections in all parts of your body (disseminated infection).  · Treatment for this condition is difficult because MAC does not respond to many common antibiotic medicines. Treatment depends on the type of infection, and may involve surgery or antibiotics.  This information is not intended to replace advice given to you by your health care provider. Make sure you discuss any questions you have with your  health care provider.  Document Released: 12/23/2014 Document Revised: 01/25/2019 Document Reviewed: 01/25/2019  [employer] Interactive Patient Education © 2019 [employer]

## 2019-12-08 PROBLEM — J18.9 COMMUNITY ACQUIRED PNEUMONIA OF LEFT LOWER LOBE OF LUNG: Status: ACTIVE | Noted: 2019-01-01

## 2019-12-09 PROBLEM — F32.5 MAJOR DEPRESSION IN REMISSION (HCC): Chronic | Status: ACTIVE | Noted: 2019-01-01

## 2019-12-09 PROBLEM — Z87.891 FORMER HEAVY TOBACCO SMOKER: Status: ACTIVE | Noted: 2019-01-01

## 2019-12-09 PROBLEM — F10.21 CHRONIC ALCOHOLISM IN REMISSION (HCC): Chronic | Status: ACTIVE | Noted: 2019-01-01

## 2019-12-09 PROBLEM — F41.9 ANXIETY: Status: ACTIVE | Noted: 2019-01-01

## 2019-12-09 PROBLEM — F32.5 MAJOR DEPRESSION IN REMISSION (HCC): Status: ACTIVE | Noted: 2019-01-01

## 2019-12-09 PROBLEM — Z99.81 DEPENDENCE ON SUPPLEMENTAL OXYGEN: Chronic | Status: ACTIVE | Noted: 2019-01-01

## 2019-12-09 PROBLEM — F41.9 ANXIETY: Chronic | Status: ACTIVE | Noted: 2019-01-01

## 2019-12-09 PROBLEM — F10.21 CHRONIC ALCOHOLISM IN REMISSION (HCC): Status: ACTIVE | Noted: 2019-01-01

## 2019-12-09 PROBLEM — Z87.891 PERSONAL HISTORY OF TOBACCO USE, PRESENTING HAZARDS TO HEALTH: Status: ACTIVE | Noted: 2019-01-01

## 2019-12-09 PROBLEM — Z87.891 FORMER HEAVY TOBACCO SMOKER: Chronic | Status: ACTIVE | Noted: 2019-01-01

## 2019-12-09 PROBLEM — Z99.81 DEPENDENCE ON SUPPLEMENTAL OXYGEN: Status: ACTIVE | Noted: 2019-01-01

## 2020-01-01 ENCOUNTER — OUTSIDE FACILITY SERVICE (OUTPATIENT)
Dept: PULMONOLOGY | Facility: CLINIC | Age: 60
End: 2020-01-01

## 2020-01-01 ENCOUNTER — APPOINTMENT (OUTPATIENT)
Dept: GENERAL RADIOLOGY | Age: 60
DRG: 177 | End: 2020-01-01
Payer: MEDICARE

## 2020-01-01 ENCOUNTER — CARE COORDINATION (OUTPATIENT)
Dept: CASE MANAGEMENT | Age: 60
End: 2020-01-01

## 2020-01-01 ENCOUNTER — HOSPITAL ENCOUNTER (INPATIENT)
Age: 60
LOS: 9 days | Discharge: HOSPICE | DRG: 177 | End: 2020-01-24
Attending: EMERGENCY MEDICINE | Admitting: FAMILY MEDICINE
Payer: MEDICARE

## 2020-01-01 VITALS
HEART RATE: 110 BPM | HEIGHT: 69 IN | DIASTOLIC BLOOD PRESSURE: 61 MMHG | TEMPERATURE: 98.3 F | SYSTOLIC BLOOD PRESSURE: 91 MMHG | BODY MASS INDEX: 14.51 KG/M2 | RESPIRATION RATE: 20 BRPM | OXYGEN SATURATION: 90 % | WEIGHT: 98 LBS

## 2020-01-01 LAB
AFB CULTURE (MYCOBACTERIA): ABNORMAL
AFB SMEAR: ABNORMAL
ALBUMIN SERPL-MCNC: 2.6 G/DL (ref 3.5–5.2)
ALP BLD-CCNC: 96 U/L (ref 40–130)
ALT SERPL-CCNC: 27 U/L (ref 5–41)
ANION GAP SERPL CALCULATED.3IONS-SCNC: 10 MMOL/L (ref 7–19)
ANION GAP SERPL CALCULATED.3IONS-SCNC: 11 MMOL/L (ref 7–19)
ANION GAP SERPL CALCULATED.3IONS-SCNC: 12 MMOL/L (ref 7–19)
ANION GAP SERPL CALCULATED.3IONS-SCNC: 12 MMOL/L (ref 7–19)
ANION GAP SERPL CALCULATED.3IONS-SCNC: 13 MMOL/L (ref 7–19)
ANION GAP SERPL CALCULATED.3IONS-SCNC: 15 MMOL/L (ref 7–19)
APTT: 37.5 SEC (ref 26–36.2)
AST SERPL-CCNC: 25 U/L (ref 5–40)
BASE EXCESS ARTERIAL: 3.8 MMOL/L (ref -2–2)
BASOPHILS ABSOLUTE: 0.1 K/UL (ref 0–0.2)
BASOPHILS RELATIVE PERCENT: 0.3 % (ref 0–1)
BASOPHILS RELATIVE PERCENT: 0.4 % (ref 0–1)
BASOPHILS RELATIVE PERCENT: 0.8 % (ref 0–1)
BILIRUB SERPL-MCNC: <0.2 MG/DL (ref 0.2–1.2)
BLOOD CULTURE, ROUTINE: NORMAL
BUN BLDV-MCNC: 7 MG/DL (ref 8–23)
BUN BLDV-MCNC: 8 MG/DL (ref 8–23)
BUN BLDV-MCNC: 8 MG/DL (ref 8–23)
BUN BLDV-MCNC: 9 MG/DL (ref 8–23)
CALCIUM SERPL-MCNC: 8 MG/DL (ref 8.8–10.2)
CALCIUM SERPL-MCNC: 8.1 MG/DL (ref 8.8–10.2)
CALCIUM SERPL-MCNC: 8.2 MG/DL (ref 8.8–10.2)
CALCIUM SERPL-MCNC: 8.5 MG/DL (ref 8.8–10.2)
CALCIUM SERPL-MCNC: 8.7 MG/DL (ref 8.8–10.2)
CALCIUM SERPL-MCNC: 8.8 MG/DL (ref 8.8–10.2)
CARBOXYHEMOGLOBIN ARTERIAL: 2.3 % (ref 0–5)
CHLORIDE BLD-SCNC: 89 MMOL/L (ref 98–111)
CHLORIDE BLD-SCNC: 90 MMOL/L (ref 98–111)
CHLORIDE BLD-SCNC: 95 MMOL/L (ref 98–111)
CHLORIDE BLD-SCNC: 95 MMOL/L (ref 98–111)
CHLORIDE BLD-SCNC: 96 MMOL/L (ref 98–111)
CHLORIDE BLD-SCNC: 96 MMOL/L (ref 98–111)
CO2: 24 MMOL/L (ref 22–29)
CO2: 25 MMOL/L (ref 22–29)
CO2: 28 MMOL/L (ref 22–29)
CREAT SERPL-MCNC: 0.5 MG/DL (ref 0.5–1.2)
CREAT SERPL-MCNC: <0.5 MG/DL (ref 0.5–1.2)
CULTURE, BLOOD 2: NORMAL
CULTURE, RESPIRATORY: ABNORMAL
CULTURE, RESPIRATORY: ABNORMAL
D DIMER: 3.52 UG/ML FEU (ref 0–0.48)
EKG P AXIS: 126 DEGREES
EKG P-R INTERVAL: 118 MS
EKG Q-T INTERVAL: 322 MS
EKG QRS DURATION: 80 MS
EKG QTC CALCULATION (BAZETT): 411 MS
EKG T AXIS: 95 DEGREES
EOSINOPHILS ABSOLUTE: 0.1 K/UL (ref 0–0.6)
EOSINOPHILS ABSOLUTE: 0.1 K/UL (ref 0–0.6)
EOSINOPHILS ABSOLUTE: 0.2 K/UL (ref 0–0.6)
EOSINOPHILS RELATIVE PERCENT: 0.4 % (ref 0–5)
EOSINOPHILS RELATIVE PERCENT: 1 % (ref 0–5)
EOSINOPHILS RELATIVE PERCENT: 2 % (ref 0–5)
FUNGUS (MYCOLOGY) CULTURE: ABNORMAL
GFR NON-AFRICAN AMERICAN: >60
GLUCOSE BLD-MCNC: 100 MG/DL (ref 74–109)
GLUCOSE BLD-MCNC: 104 MG/DL (ref 74–109)
GLUCOSE BLD-MCNC: 109 MG/DL (ref 74–109)
GLUCOSE BLD-MCNC: 116 MG/DL (ref 74–109)
GLUCOSE BLD-MCNC: 88 MG/DL (ref 74–109)
GLUCOSE BLD-MCNC: 91 MG/DL (ref 74–109)
GLUCOSE BLD-MCNC: 98 MG/DL (ref 70–99)
GRAM STAIN RESULT: ABNORMAL
HCO3 ARTERIAL: 27.7 MMOL/L (ref 22–26)
HCT VFR BLD CALC: 29.6 % (ref 42–52)
HCT VFR BLD CALC: 30.7 % (ref 42–52)
HCT VFR BLD CALC: 32.5 % (ref 42–52)
HCT VFR BLD CALC: 33.8 % (ref 42–52)
HCT VFR BLD CALC: 34.4 % (ref 42–52)
HCT VFR BLD CALC: 38.8 % (ref 42–52)
HEMOGLOBIN, ART, EXTENDED: 10.5 G/DL (ref 14–18)
HEMOGLOBIN: 10.2 G/DL (ref 14–18)
HEMOGLOBIN: 10.8 G/DL (ref 14–18)
HEMOGLOBIN: 11.8 G/DL (ref 14–18)
HEMOGLOBIN: 9.3 G/DL (ref 14–18)
HEMOGLOBIN: 9.4 G/DL (ref 14–18)
HEMOGLOBIN: 9.9 G/DL (ref 14–18)
IMMATURE GRANULOCYTES #: 0.1 K/UL
INR BLD: 1.15 (ref 0.88–1.18)
KOH PREP: ABNORMAL
LACTIC ACID: 2.9 MMOL/L (ref 0.5–1.9)
LYMPHOCYTES ABSOLUTE: 0.4 K/UL (ref 1.1–4.5)
LYMPHOCYTES ABSOLUTE: 0.7 K/UL (ref 1.1–4.5)
LYMPHOCYTES ABSOLUTE: 0.7 K/UL (ref 1.1–4.5)
LYMPHOCYTES RELATIVE PERCENT: 2.5 % (ref 20–40)
LYMPHOCYTES RELATIVE PERCENT: 5.7 % (ref 20–40)
LYMPHOCYTES RELATIVE PERCENT: 7.3 % (ref 20–40)
MCH RBC QN AUTO: 26.3 PG (ref 27–31)
MCH RBC QN AUTO: 26.4 PG (ref 27–31)
MCH RBC QN AUTO: 26.4 PG (ref 27–31)
MCH RBC QN AUTO: 26.5 PG (ref 27–31)
MCH RBC QN AUTO: 26.6 PG (ref 27–31)
MCH RBC QN AUTO: 27 PG (ref 27–31)
MCHC RBC AUTO-ENTMCNC: 30.2 G/DL (ref 33–37)
MCHC RBC AUTO-ENTMCNC: 30.4 G/DL (ref 33–37)
MCHC RBC AUTO-ENTMCNC: 30.5 G/DL (ref 33–37)
MCHC RBC AUTO-ENTMCNC: 30.6 G/DL (ref 33–37)
MCHC RBC AUTO-ENTMCNC: 31.4 G/DL (ref 33–37)
MCHC RBC AUTO-ENTMCNC: 31.4 G/DL (ref 33–37)
MCV RBC AUTO: 84.5 FL (ref 80–94)
MCV RBC AUTO: 85.8 FL (ref 80–94)
MCV RBC AUTO: 86 FL (ref 80–94)
MCV RBC AUTO: 86.7 FL (ref 80–94)
MCV RBC AUTO: 87.6 FL (ref 80–94)
MCV RBC AUTO: 87.6 FL (ref 80–94)
METHEMOGLOBIN ARTERIAL: 1 %
MISCELLANEOUS LAB TEST RESULT: NORMAL
MISCELLANEOUS LAB TEST RESULT: NORMAL
MONOCYTES ABSOLUTE: 1 K/UL (ref 0–0.9)
MONOCYTES ABSOLUTE: 1.2 K/UL (ref 0–0.9)
MONOCYTES ABSOLUTE: 1.7 K/UL (ref 0–0.9)
MONOCYTES RELATIVE PERCENT: 12.8 % (ref 0–10)
MONOCYTES RELATIVE PERCENT: 13.7 % (ref 0–10)
MONOCYTES RELATIVE PERCENT: 6.7 % (ref 0–10)
NEUTROPHILS ABSOLUTE: 13 K/UL (ref 1.5–7.5)
NEUTROPHILS ABSOLUTE: 7 K/UL (ref 1.5–7.5)
NEUTROPHILS ABSOLUTE: 9.7 K/UL (ref 1.5–7.5)
NEUTROPHILS RELATIVE PERCENT: 76.4 % (ref 50–65)
NEUTROPHILS RELATIVE PERCENT: 78.6 % (ref 50–65)
NEUTROPHILS RELATIVE PERCENT: 89.5 % (ref 50–65)
O2 CONTENT ARTERIAL: 13.9 ML/DL
O2 SAT, ARTERIAL: 93.5 %
O2 THERAPY: ABNORMAL
ORGANISM: ABNORMAL
ORGANISM: ABNORMAL
PCO2 ARTERIAL: 38 MMHG (ref 35–45)
PDW BLD-RTO: 13.5 % (ref 11.5–14.5)
PDW BLD-RTO: 13.6 % (ref 11.5–14.5)
PDW BLD-RTO: 13.9 % (ref 11.5–14.5)
PDW BLD-RTO: 14 % (ref 11.5–14.5)
PDW BLD-RTO: 14.2 % (ref 11.5–14.5)
PDW BLD-RTO: 14.2 % (ref 11.5–14.5)
PERFORMED ON: NORMAL
PH ARTERIAL: 7.47 (ref 7.35–7.45)
PLATELET # BLD: 362 K/UL (ref 130–400)
PLATELET # BLD: 369 K/UL (ref 130–400)
PLATELET # BLD: 396 K/UL (ref 130–400)
PLATELET # BLD: 404 K/UL (ref 130–400)
PLATELET # BLD: 429 K/UL (ref 130–400)
PLATELET # BLD: 453 K/UL (ref 130–400)
PMV BLD AUTO: 10 FL (ref 9.4–12.4)
PMV BLD AUTO: 10 FL (ref 9.4–12.4)
PMV BLD AUTO: 10.3 FL (ref 9.4–12.4)
PMV BLD AUTO: 11.2 FL (ref 9.4–12.4)
PMV BLD AUTO: 9.5 FL (ref 9.4–12.4)
PMV BLD AUTO: 9.6 FL (ref 9.4–12.4)
PO2 ARTERIAL: 65 MMHG (ref 80–100)
POTASSIUM REFLEX MAGNESIUM: 4 MMOL/L (ref 3.5–5)
POTASSIUM SERPL-SCNC: 3.7 MMOL/L (ref 3.5–5)
POTASSIUM SERPL-SCNC: 4 MMOL/L (ref 3.5–5)
POTASSIUM SERPL-SCNC: 4.1 MMOL/L (ref 3.5–5)
POTASSIUM SERPL-SCNC: 4.2 MMOL/L (ref 3.5–5)
POTASSIUM SERPL-SCNC: 5.1 MMOL/L (ref 3.5–5)
POTASSIUM, WHOLE BLOOD: 3.9
PROCALCITONIN: 0.17 NG/ML
PROTHROMBIN TIME: 14.1 SEC (ref 12–14.6)
RBC # BLD: 3.45 M/UL (ref 4.7–6.1)
RBC # BLD: 3.57 M/UL (ref 4.7–6.1)
RBC # BLD: 3.75 M/UL (ref 4.7–6.1)
RBC # BLD: 3.86 M/UL (ref 4.7–6.1)
RBC # BLD: 4.07 M/UL (ref 4.7–6.1)
RBC # BLD: 4.43 M/UL (ref 4.7–6.1)
SODIUM BLD-SCNC: 129 MMOL/L (ref 136–145)
SODIUM BLD-SCNC: 130 MMOL/L (ref 136–145)
SODIUM BLD-SCNC: 130 MMOL/L (ref 136–145)
SODIUM BLD-SCNC: 132 MMOL/L (ref 136–145)
SODIUM BLD-SCNC: 132 MMOL/L (ref 136–145)
SODIUM BLD-SCNC: 133 MMOL/L (ref 136–145)
TOTAL PROTEIN: 6.4 G/DL (ref 6.6–8.7)
TROPONIN: 0.02 NG/ML (ref 0–0.03)
WBC # BLD: 11.2 K/UL (ref 4.8–10.8)
WBC # BLD: 11.6 K/UL (ref 4.8–10.8)
WBC # BLD: 12.2 K/UL (ref 4.8–10.8)
WBC # BLD: 12.4 K/UL (ref 4.8–10.8)
WBC # BLD: 14.5 K/UL (ref 4.8–10.8)
WBC # BLD: 9.1 K/UL (ref 4.8–10.8)

## 2020-01-01 PROCEDURE — 6370000000 HC RX 637 (ALT 250 FOR IP): Performed by: FAMILY MEDICINE

## 2020-01-01 PROCEDURE — 6370000000 HC RX 637 (ALT 250 FOR IP): Performed by: NURSE PRACTITIONER

## 2020-01-01 PROCEDURE — 36415 COLL VENOUS BLD VENIPUNCTURE: CPT

## 2020-01-01 PROCEDURE — 87070 CULTURE OTHR SPECIMN AEROBIC: CPT

## 2020-01-01 PROCEDURE — 6360000002 HC RX W HCPCS: Performed by: INTERNAL MEDICINE

## 2020-01-01 PROCEDURE — 99232 SBSQ HOSP IP/OBS MODERATE 35: CPT | Performed by: INTERNAL MEDICINE

## 2020-01-01 PROCEDURE — 85027 COMPLETE CBC AUTOMATED: CPT

## 2020-01-01 PROCEDURE — 85025 COMPLETE CBC W/AUTO DIFF WBC: CPT

## 2020-01-01 PROCEDURE — 83605 ASSAY OF LACTIC ACID: CPT

## 2020-01-01 PROCEDURE — 6360000002 HC RX W HCPCS: Performed by: FAMILY MEDICINE

## 2020-01-01 PROCEDURE — 80048 BASIC METABOLIC PNL TOTAL CA: CPT

## 2020-01-01 PROCEDURE — 96365 THER/PROPH/DIAG IV INF INIT: CPT

## 2020-01-01 PROCEDURE — 2700000000 HC OXYGEN THERAPY PER DAY

## 2020-01-01 PROCEDURE — 2580000003 HC RX 258: Performed by: INTERNAL MEDICINE

## 2020-01-01 PROCEDURE — 2580000003 HC RX 258: Performed by: FAMILY MEDICINE

## 2020-01-01 PROCEDURE — 87107 FUNGI IDENTIFICATION MOLD: CPT

## 2020-01-01 PROCEDURE — 2580000003 HC RX 258: Performed by: EMERGENCY MEDICINE

## 2020-01-01 PROCEDURE — 84132 ASSAY OF SERUM POTASSIUM: CPT

## 2020-01-01 PROCEDURE — 1210000000 HC MED SURG R&B

## 2020-01-01 PROCEDURE — 94640 AIRWAY INHALATION TREATMENT: CPT

## 2020-01-01 PROCEDURE — 6370000000 HC RX 637 (ALT 250 FOR IP): Performed by: INTERNAL MEDICINE

## 2020-01-01 PROCEDURE — 51702 INSERT TEMP BLADDER CATH: CPT

## 2020-01-01 PROCEDURE — 87106 FUNGI IDENTIFICATION YEAST: CPT

## 2020-01-01 PROCEDURE — 99222 1ST HOSP IP/OBS MODERATE 55: CPT | Performed by: INTERNAL MEDICINE

## 2020-01-01 PROCEDURE — 82948 REAGENT STRIP/BLOOD GLUCOSE: CPT

## 2020-01-01 PROCEDURE — 85610 PROTHROMBIN TIME: CPT

## 2020-01-01 PROCEDURE — 71045 X-RAY EXAM CHEST 1 VIEW: CPT

## 2020-01-01 PROCEDURE — 87205 SMEAR GRAM STAIN: CPT

## 2020-01-01 PROCEDURE — 84145 PROCALCITONIN (PCT): CPT

## 2020-01-01 PROCEDURE — 87040 BLOOD CULTURE FOR BACTERIA: CPT

## 2020-01-01 PROCEDURE — 71046 X-RAY EXAM CHEST 2 VIEWS: CPT

## 2020-01-01 PROCEDURE — 84484 ASSAY OF TROPONIN QUANT: CPT

## 2020-01-01 PROCEDURE — 96375 TX/PRO/DX INJ NEW DRUG ADDON: CPT

## 2020-01-01 PROCEDURE — 82803 BLOOD GASES ANY COMBINATION: CPT

## 2020-01-01 PROCEDURE — 93005 ELECTROCARDIOGRAM TRACING: CPT | Performed by: EMERGENCY MEDICINE

## 2020-01-01 PROCEDURE — 85730 THROMBOPLASTIN TIME PARTIAL: CPT

## 2020-01-01 PROCEDURE — 6360000002 HC RX W HCPCS: Performed by: EMERGENCY MEDICINE

## 2020-01-01 PROCEDURE — 87102 FUNGUS ISOLATION CULTURE: CPT

## 2020-01-01 PROCEDURE — 85379 FIBRIN DEGRADATION QUANT: CPT

## 2020-01-01 PROCEDURE — 99285 EMERGENCY DEPT VISIT HI MDM: CPT

## 2020-01-01 RX ORDER — LORAZEPAM 0.5 MG/1
0.5 TABLET ORAL EVERY 4 HOURS PRN
Status: DISCONTINUED | OUTPATIENT
Start: 2020-01-01 | End: 2020-01-01 | Stop reason: HOSPADM

## 2020-01-01 RX ORDER — ACETAMINOPHEN 325 MG/1
650 TABLET ORAL EVERY 4 HOURS PRN
Status: CANCELLED | OUTPATIENT
Start: 2020-01-01

## 2020-01-01 RX ORDER — MOXIFLOXACIN HYDROCHLORIDE 400 MG/1
400 TABLET ORAL DAILY
Status: CANCELLED | OUTPATIENT
Start: 2020-01-01

## 2020-01-01 RX ORDER — HYDROCODONE BITARTRATE AND ACETAMINOPHEN 7.5; 325 MG/1; MG/1
1 TABLET ORAL EVERY 6 HOURS PRN
Status: DISCONTINUED | OUTPATIENT
Start: 2020-01-01 | End: 2020-01-01 | Stop reason: HOSPADM

## 2020-01-01 RX ORDER — POSACONAZOLE 100 MG/1
300 TABLET, DELAYED RELEASE ORAL 2 TIMES DAILY WITH MEALS
Status: COMPLETED | OUTPATIENT
Start: 2020-01-01 | End: 2020-01-01

## 2020-01-01 RX ORDER — ALBUTEROL SULFATE 90 UG/1
2 AEROSOL, METERED RESPIRATORY (INHALATION) EVERY 6 HOURS PRN
Status: CANCELLED | OUTPATIENT
Start: 2020-01-01

## 2020-01-01 RX ORDER — FAMOTIDINE 20 MG/1
20 TABLET, FILM COATED ORAL 2 TIMES DAILY
Status: DISCONTINUED | OUTPATIENT
Start: 2020-01-01 | End: 2020-01-01 | Stop reason: HOSPADM

## 2020-01-01 RX ORDER — PREDNISONE 10 MG/1
10 TABLET ORAL DAILY
Status: CANCELLED | OUTPATIENT
Start: 2020-01-01

## 2020-01-01 RX ORDER — MEGESTROL ACETATE 125 MG/ML
625 SUSPENSION ORAL DAILY
Status: DISCONTINUED | OUTPATIENT
Start: 2020-01-01 | End: 2020-01-01 | Stop reason: HOSPADM

## 2020-01-01 RX ORDER — MORPHINE SULFATE 4 MG/ML
1 INJECTION, SOLUTION INTRAMUSCULAR; INTRAVENOUS
Status: CANCELLED | OUTPATIENT
Start: 2020-01-01

## 2020-01-01 RX ORDER — FAMOTIDINE 20 MG/1
20 TABLET, FILM COATED ORAL 2 TIMES DAILY
Status: CANCELLED | OUTPATIENT
Start: 2020-01-01

## 2020-01-01 RX ORDER — SODIUM CHLORIDE, SODIUM LACTATE, POTASSIUM CHLORIDE, CALCIUM CHLORIDE 600; 310; 30; 20 MG/100ML; MG/100ML; MG/100ML; MG/100ML
1000 INJECTION, SOLUTION INTRAVENOUS ONCE
Status: DISCONTINUED | OUTPATIENT
Start: 2020-01-01 | End: 2020-01-01

## 2020-01-01 RX ORDER — GUAIFENESIN 600 MG/1
1200 TABLET, EXTENDED RELEASE ORAL 2 TIMES DAILY
Status: DISCONTINUED | OUTPATIENT
Start: 2020-01-01 | End: 2020-01-01 | Stop reason: HOSPADM

## 2020-01-01 RX ORDER — ALBUTEROL SULFATE 2.5 MG/3ML
7.5 SOLUTION RESPIRATORY (INHALATION) ONCE
Status: COMPLETED | OUTPATIENT
Start: 2020-01-01 | End: 2020-01-01

## 2020-01-01 RX ORDER — ACETAMINOPHEN 325 MG/1
650 TABLET ORAL EVERY 4 HOURS PRN
Status: DISCONTINUED | OUTPATIENT
Start: 2020-01-01 | End: 2020-01-01 | Stop reason: HOSPADM

## 2020-01-01 RX ORDER — MORPHINE SULFATE 4 MG/ML
1 INJECTION, SOLUTION INTRAMUSCULAR; INTRAVENOUS
Status: DISCONTINUED | OUTPATIENT
Start: 2020-01-01 | End: 2020-01-01 | Stop reason: HOSPADM

## 2020-01-01 RX ORDER — ALBUTEROL SULFATE 90 UG/1
2 AEROSOL, METERED RESPIRATORY (INHALATION) EVERY 6 HOURS PRN
Status: DISCONTINUED | OUTPATIENT
Start: 2020-01-01 | End: 2020-01-01 | Stop reason: HOSPADM

## 2020-01-01 RX ORDER — LACTOBACILLUS RHAMNOSUS GG 10B CELL
1 CAPSULE ORAL DAILY
Status: DISCONTINUED | OUTPATIENT
Start: 2020-01-01 | End: 2020-01-01 | Stop reason: HOSPADM

## 2020-01-01 RX ORDER — FUROSEMIDE 20 MG/1
20 TABLET ORAL DAILY
Status: DISCONTINUED | OUTPATIENT
Start: 2020-01-01 | End: 2020-01-01 | Stop reason: HOSPADM

## 2020-01-01 RX ORDER — HYDROCODONE BITARTRATE AND ACETAMINOPHEN 5; 325 MG/1; MG/1
1 TABLET ORAL EVERY 6 HOURS PRN
Status: DISCONTINUED | OUTPATIENT
Start: 2020-01-01 | End: 2020-01-01 | Stop reason: HOSPADM

## 2020-01-01 RX ORDER — IPRATROPIUM BROMIDE AND ALBUTEROL SULFATE 2.5; .5 MG/3ML; MG/3ML
1 SOLUTION RESPIRATORY (INHALATION)
Status: CANCELLED | OUTPATIENT
Start: 2020-01-01

## 2020-01-01 RX ORDER — ETHAMBUTOL HYDROCHLORIDE 400 MG/1
800 TABLET, FILM COATED ORAL DAILY
Status: CANCELLED | OUTPATIENT
Start: 2020-01-01

## 2020-01-01 RX ORDER — SODIUM CHLORIDE 0.9 % (FLUSH) 0.9 %
10 SYRINGE (ML) INJECTION PRN
Status: CANCELLED | OUTPATIENT
Start: 2020-01-01

## 2020-01-01 RX ORDER — POSACONAZOLE 100 MG/1
300 TABLET, DELAYED RELEASE ORAL
Status: DISCONTINUED | OUTPATIENT
Start: 2020-01-01 | End: 2020-01-01 | Stop reason: HOSPADM

## 2020-01-01 RX ORDER — LEVOFLOXACIN 750 MG/1
750 TABLET ORAL DAILY
Status: DISCONTINUED | OUTPATIENT
Start: 2020-01-01 | End: 2020-01-01 | Stop reason: ALTCHOICE

## 2020-01-01 RX ORDER — ETHAMBUTOL HYDROCHLORIDE 400 MG/1
800 TABLET, FILM COATED ORAL DAILY
Status: DISCONTINUED | OUTPATIENT
Start: 2020-01-01 | End: 2020-01-01 | Stop reason: HOSPADM

## 2020-01-01 RX ORDER — AZITHROMYCIN 250 MG/1
500 TABLET, FILM COATED ORAL DAILY
Status: DISCONTINUED | OUTPATIENT
Start: 2020-01-01 | End: 2020-01-01 | Stop reason: HOSPADM

## 2020-01-01 RX ORDER — SODIUM CHLORIDE 0.9 % (FLUSH) 0.9 %
10 SYRINGE (ML) INJECTION EVERY 12 HOURS SCHEDULED
Status: DISCONTINUED | OUTPATIENT
Start: 2020-01-01 | End: 2020-01-01 | Stop reason: HOSPADM

## 2020-01-01 RX ORDER — LORAZEPAM 0.5 MG/1
0.5 TABLET ORAL EVERY 4 HOURS PRN
Status: CANCELLED | OUTPATIENT
Start: 2020-01-01

## 2020-01-01 RX ORDER — SODIUM CHLORIDE, SODIUM LACTATE, POTASSIUM CHLORIDE, CALCIUM CHLORIDE 600; 310; 30; 20 MG/100ML; MG/100ML; MG/100ML; MG/100ML
1000 INJECTION, SOLUTION INTRAVENOUS ONCE
Status: COMPLETED | OUTPATIENT
Start: 2020-01-01 | End: 2020-01-01

## 2020-01-01 RX ORDER — POSACONAZOLE 100 MG/1
300 TABLET, DELAYED RELEASE ORAL
Status: CANCELLED | OUTPATIENT
Start: 2020-01-01

## 2020-01-01 RX ORDER — SODIUM CHLORIDE 0.9 % (FLUSH) 0.9 %
10 SYRINGE (ML) INJECTION PRN
Status: DISCONTINUED | OUTPATIENT
Start: 2020-01-01 | End: 2020-01-01 | Stop reason: HOSPADM

## 2020-01-01 RX ORDER — HYDROXYZINE HYDROCHLORIDE 10 MG/1
10 TABLET, FILM COATED ORAL 3 TIMES DAILY PRN
Status: DISCONTINUED | OUTPATIENT
Start: 2020-01-01 | End: 2020-01-01

## 2020-01-01 RX ORDER — AZITHROMYCIN 250 MG/1
500 TABLET, FILM COATED ORAL DAILY
Status: CANCELLED | OUTPATIENT
Start: 2020-01-01

## 2020-01-01 RX ORDER — MEGESTROL ACETATE 125 MG/ML
625 SUSPENSION ORAL DAILY
Status: CANCELLED | OUTPATIENT
Start: 2020-01-01

## 2020-01-01 RX ORDER — IPRATROPIUM BROMIDE AND ALBUTEROL SULFATE 2.5; .5 MG/3ML; MG/3ML
1 SOLUTION RESPIRATORY (INHALATION)
Status: DISCONTINUED | OUTPATIENT
Start: 2020-01-01 | End: 2020-01-01 | Stop reason: HOSPADM

## 2020-01-01 RX ORDER — LACTOBACILLUS RHAMNOSUS GG 10B CELL
1 CAPSULE ORAL DAILY
Status: CANCELLED | OUTPATIENT
Start: 2020-01-01

## 2020-01-01 RX ORDER — 0.9 % SODIUM CHLORIDE 0.9 %
500 INTRAVENOUS SOLUTION INTRAVENOUS ONCE
Status: COMPLETED | OUTPATIENT
Start: 2020-01-01 | End: 2020-01-01

## 2020-01-01 RX ORDER — FUROSEMIDE 20 MG/1
20 TABLET ORAL DAILY
Status: CANCELLED | OUTPATIENT
Start: 2020-01-01

## 2020-01-01 RX ORDER — PREDNISONE 10 MG/1
10 TABLET ORAL DAILY
Status: DISCONTINUED | OUTPATIENT
Start: 2020-01-01 | End: 2020-01-01 | Stop reason: HOSPADM

## 2020-01-01 RX ORDER — METHYLPREDNISOLONE SODIUM SUCCINATE 125 MG/2ML
125 INJECTION, POWDER, LYOPHILIZED, FOR SOLUTION INTRAMUSCULAR; INTRAVENOUS ONCE
Status: COMPLETED | OUTPATIENT
Start: 2020-01-01 | End: 2020-01-01

## 2020-01-01 RX ORDER — SODIUM CHLORIDE 0.9 % (FLUSH) 0.9 %
10 SYRINGE (ML) INJECTION EVERY 12 HOURS SCHEDULED
Status: CANCELLED | OUTPATIENT
Start: 2020-01-01

## 2020-01-01 RX ORDER — MOXIFLOXACIN HYDROCHLORIDE 400 MG/1
400 TABLET ORAL DAILY
Status: DISCONTINUED | OUTPATIENT
Start: 2020-01-01 | End: 2020-01-01 | Stop reason: HOSPADM

## 2020-01-01 RX ADMIN — FUROSEMIDE 20 MG: 20 TABLET ORAL at 09:22

## 2020-01-01 RX ADMIN — MEROPENEM 1 G: 1 INJECTION, POWDER, FOR SOLUTION INTRAVENOUS at 20:04

## 2020-01-01 RX ADMIN — FAMOTIDINE 20 MG: 20 TABLET, FILM COATED ORAL at 20:43

## 2020-01-01 RX ADMIN — MOXIFLOXACIN HYDROCHLORIDE 400 MG: 400 TABLET, FILM COATED ORAL at 08:34

## 2020-01-01 RX ADMIN — IPRATROPIUM BROMIDE AND ALBUTEROL SULFATE 1 AMPULE: .5; 3 SOLUTION RESPIRATORY (INHALATION) at 10:40

## 2020-01-01 RX ADMIN — GUAIFENESIN 1200 MG: 600 TABLET, EXTENDED RELEASE ORAL at 18:17

## 2020-01-01 RX ADMIN — SODIUM CHLORIDE 500 ML: 9 INJECTION, SOLUTION INTRAVENOUS at 15:03

## 2020-01-01 RX ADMIN — GUAIFENESIN 1200 MG: 600 TABLET, EXTENDED RELEASE ORAL at 10:44

## 2020-01-01 RX ADMIN — Medication 10 ML: at 08:25

## 2020-01-01 RX ADMIN — GUAIFENESIN 1200 MG: 600 TABLET, EXTENDED RELEASE ORAL at 08:43

## 2020-01-01 RX ADMIN — Medication 10 ML: at 20:34

## 2020-01-01 RX ADMIN — HYDROCODONE BITARTRATE AND ACETAMINOPHEN 1 TABLET: 5; 325 TABLET ORAL at 18:20

## 2020-01-01 RX ADMIN — AZITHROMYCIN MONOHYDRATE 500 MG: 250 TABLET ORAL at 09:21

## 2020-01-01 RX ADMIN — MEROPENEM 1 G: 1 INJECTION, POWDER, FOR SOLUTION INTRAVENOUS at 09:26

## 2020-01-01 RX ADMIN — GUAIFENESIN 1200 MG: 600 TABLET, EXTENDED RELEASE ORAL at 18:26

## 2020-01-01 RX ADMIN — FAMOTIDINE 20 MG: 20 TABLET, FILM COATED ORAL at 08:24

## 2020-01-01 RX ADMIN — ETHAMBUTOL HYDROCHLORIDE 800 MG: 400 TABLET, FILM COATED ORAL at 08:43

## 2020-01-01 RX ADMIN — GUAIFENESIN 1200 MG: 600 TABLET, EXTENDED RELEASE ORAL at 08:34

## 2020-01-01 RX ADMIN — Medication 10 ML: at 08:44

## 2020-01-01 RX ADMIN — HYDROCODONE BITARTRATE AND ACETAMINOPHEN 1 TABLET: 5; 325 TABLET ORAL at 11:08

## 2020-01-01 RX ADMIN — FAMOTIDINE 20 MG: 20 TABLET, FILM COATED ORAL at 08:34

## 2020-01-01 RX ADMIN — Medication 10 ML: at 09:09

## 2020-01-01 RX ADMIN — IPRATROPIUM BROMIDE AND ALBUTEROL SULFATE 1 AMPULE: .5; 3 SOLUTION RESPIRATORY (INHALATION) at 19:43

## 2020-01-01 RX ADMIN — FUROSEMIDE 20 MG: 20 TABLET ORAL at 10:44

## 2020-01-01 RX ADMIN — GUAIFENESIN 1200 MG: 600 TABLET, EXTENDED RELEASE ORAL at 07:57

## 2020-01-01 RX ADMIN — FAMOTIDINE 20 MG: 20 TABLET, FILM COATED ORAL at 07:56

## 2020-01-01 RX ADMIN — RIFAMPIN 600 MG: 300 CAPSULE ORAL at 08:34

## 2020-01-01 RX ADMIN — MEROPENEM 1 G: 1 INJECTION, POWDER, FOR SOLUTION INTRAVENOUS at 11:04

## 2020-01-01 RX ADMIN — HYDROXYZINE HYDROCHLORIDE 10 MG: 10 TABLET, FILM COATED ORAL at 00:54

## 2020-01-01 RX ADMIN — POSACONAZOLE 300 MG: 100 TABLET, DELAYED RELEASE ORAL at 09:46

## 2020-01-01 RX ADMIN — Medication 625 MG: at 08:25

## 2020-01-01 RX ADMIN — FAMOTIDINE 20 MG: 20 TABLET, FILM COATED ORAL at 19:41

## 2020-01-01 RX ADMIN — AZITHROMYCIN MONOHYDRATE 500 MG: 250 TABLET ORAL at 08:34

## 2020-01-01 RX ADMIN — Medication 625 MG: at 10:46

## 2020-01-01 RX ADMIN — Medication 10 ML: at 19:41

## 2020-01-01 RX ADMIN — MOXIFLOXACIN HYDROCHLORIDE 400 MG: 400 TABLET, FILM COATED ORAL at 09:22

## 2020-01-01 RX ADMIN — FAMOTIDINE 20 MG: 20 TABLET, FILM COATED ORAL at 19:40

## 2020-01-01 RX ADMIN — GUAIFENESIN 1200 MG: 600 TABLET, EXTENDED RELEASE ORAL at 17:30

## 2020-01-01 RX ADMIN — Medication 10 ML: at 08:05

## 2020-01-01 RX ADMIN — AZITHROMYCIN MONOHYDRATE 500 MG: 250 TABLET ORAL at 07:56

## 2020-01-01 RX ADMIN — MEROPENEM 1 G: 1 INJECTION, POWDER, FOR SOLUTION INTRAVENOUS at 01:38

## 2020-01-01 RX ADMIN — IPRATROPIUM BROMIDE AND ALBUTEROL SULFATE 1 AMPULE: .5; 3 SOLUTION RESPIRATORY (INHALATION) at 11:14

## 2020-01-01 RX ADMIN — ETHAMBUTOL HYDROCHLORIDE 800 MG: 400 TABLET, FILM COATED ORAL at 07:56

## 2020-01-01 RX ADMIN — ROFLUMILAST 500 MCG: 500 TABLET ORAL at 08:43

## 2020-01-01 RX ADMIN — IPRATROPIUM BROMIDE AND ALBUTEROL SULFATE 1 AMPULE: .5; 3 SOLUTION RESPIRATORY (INHALATION) at 06:28

## 2020-01-01 RX ADMIN — HYDROXYZINE HYDROCHLORIDE 10 MG: 10 TABLET, FILM COATED ORAL at 20:34

## 2020-01-01 RX ADMIN — POSACONAZOLE 300 MG: 100 TABLET, DELAYED RELEASE ORAL at 17:39

## 2020-01-01 RX ADMIN — FAMOTIDINE 20 MG: 20 TABLET, FILM COATED ORAL at 10:45

## 2020-01-01 RX ADMIN — ALBUTEROL SULFATE 7.5 MG: 2.5 SOLUTION RESPIRATORY (INHALATION) at 15:11

## 2020-01-01 RX ADMIN — HYDROCODONE BITARTRATE AND ACETAMINOPHEN 1 TABLET: 5; 325 TABLET ORAL at 02:17

## 2020-01-01 RX ADMIN — ETHAMBUTOL HYDROCHLORIDE 800 MG: 400 TABLET, FILM COATED ORAL at 09:21

## 2020-01-01 RX ADMIN — FAMOTIDINE 20 MG: 20 TABLET, FILM COATED ORAL at 20:34

## 2020-01-01 RX ADMIN — HYDROXYZINE HYDROCHLORIDE 10 MG: 10 TABLET, FILM COATED ORAL at 20:53

## 2020-01-01 RX ADMIN — IPRATROPIUM BROMIDE AND ALBUTEROL SULFATE 1 AMPULE: .5; 3 SOLUTION RESPIRATORY (INHALATION) at 18:45

## 2020-01-01 RX ADMIN — MEROPENEM 1 G: 1 INJECTION, POWDER, FOR SOLUTION INTRAVENOUS at 05:51

## 2020-01-01 RX ADMIN — IPRATROPIUM BROMIDE AND ALBUTEROL SULFATE 1 AMPULE: .5; 3 SOLUTION RESPIRATORY (INHALATION) at 19:51

## 2020-01-01 RX ADMIN — FUROSEMIDE 20 MG: 20 TABLET ORAL at 08:25

## 2020-01-01 RX ADMIN — Medication 1 CAPSULE: at 13:50

## 2020-01-01 RX ADMIN — Medication 10 ML: at 19:40

## 2020-01-01 RX ADMIN — RIFAMPIN 600 MG: 300 CAPSULE ORAL at 07:57

## 2020-01-01 RX ADMIN — HYDROCODONE BITARTRATE AND ACETAMINOPHEN 1 TABLET: 5; 325 TABLET ORAL at 00:53

## 2020-01-01 RX ADMIN — Medication 1 CAPSULE: at 08:43

## 2020-01-01 RX ADMIN — GUAIFENESIN 1200 MG: 600 TABLET, EXTENDED RELEASE ORAL at 20:45

## 2020-01-01 RX ADMIN — ACETAMINOPHEN 650 MG: 325 TABLET ORAL at 11:03

## 2020-01-01 RX ADMIN — Medication 1 CAPSULE: at 08:24

## 2020-01-01 RX ADMIN — IPRATROPIUM BROMIDE AND ALBUTEROL SULFATE 1 AMPULE: .5; 3 SOLUTION RESPIRATORY (INHALATION) at 06:45

## 2020-01-01 RX ADMIN — GUAIFENESIN 1200 MG: 600 TABLET, EXTENDED RELEASE ORAL at 09:47

## 2020-01-01 RX ADMIN — ACETAMINOPHEN 650 MG: 325 TABLET ORAL at 05:51

## 2020-01-01 RX ADMIN — FUROSEMIDE 20 MG: 20 TABLET ORAL at 07:57

## 2020-01-01 RX ADMIN — RIFAMPIN 600 MG: 300 CAPSULE ORAL at 08:24

## 2020-01-01 RX ADMIN — ETHAMBUTOL HYDROCHLORIDE 800 MG: 400 TABLET, FILM COATED ORAL at 09:09

## 2020-01-01 RX ADMIN — FAMOTIDINE 20 MG: 20 TABLET, FILM COATED ORAL at 09:22

## 2020-01-01 RX ADMIN — GUAIFENESIN 1200 MG: 600 TABLET, EXTENDED RELEASE ORAL at 08:24

## 2020-01-01 RX ADMIN — SODIUM CHLORIDE, SODIUM LACTATE, POTASSIUM CHLORIDE, AND CALCIUM CHLORIDE 1000 ML: 600; 310; 30; 20 INJECTION, SOLUTION INTRAVENOUS at 22:00

## 2020-01-01 RX ADMIN — HYDROCODONE BITARTRATE AND ACETAMINOPHEN 1 TABLET: 5; 325 TABLET ORAL at 20:54

## 2020-01-01 RX ADMIN — FAMOTIDINE 20 MG: 20 TABLET, FILM COATED ORAL at 08:58

## 2020-01-01 RX ADMIN — IPRATROPIUM BROMIDE AND ALBUTEROL SULFATE 1 AMPULE: .5; 3 SOLUTION RESPIRATORY (INHALATION) at 06:47

## 2020-01-01 RX ADMIN — MEROPENEM 1 G: 1 INJECTION, POWDER, FOR SOLUTION INTRAVENOUS at 08:43

## 2020-01-01 RX ADMIN — AZITHROMYCIN MONOHYDRATE 500 MG: 250 TABLET ORAL at 09:09

## 2020-01-01 RX ADMIN — ETHAMBUTOL HYDROCHLORIDE 800 MG: 400 TABLET, FILM COATED ORAL at 08:24

## 2020-01-01 RX ADMIN — RIFAMPIN 600 MG: 300 CAPSULE ORAL at 09:20

## 2020-01-01 RX ADMIN — CEFTRIAXONE 1 G: 1 INJECTION, POWDER, FOR SOLUTION INTRAMUSCULAR; INTRAVENOUS at 17:03

## 2020-01-01 RX ADMIN — IPRATROPIUM BROMIDE AND ALBUTEROL SULFATE 1 AMPULE: .5; 3 SOLUTION RESPIRATORY (INHALATION) at 14:20

## 2020-01-01 RX ADMIN — FAMOTIDINE 20 MG: 20 TABLET, FILM COATED ORAL at 20:51

## 2020-01-01 RX ADMIN — IPRATROPIUM BROMIDE AND ALBUTEROL SULFATE 1 AMPULE: .5; 3 SOLUTION RESPIRATORY (INHALATION) at 15:03

## 2020-01-01 RX ADMIN — RIFAMPIN 600 MG: 300 CAPSULE ORAL at 10:46

## 2020-01-01 RX ADMIN — RIFAMPIN 600 MG: 300 CAPSULE ORAL at 08:43

## 2020-01-01 RX ADMIN — MORPHINE SULFATE 1 MG: 4 INJECTION, SOLUTION INTRAMUSCULAR; INTRAVENOUS at 09:23

## 2020-01-01 RX ADMIN — HYDROXYZINE HYDROCHLORIDE 10 MG: 10 TABLET, FILM COATED ORAL at 07:57

## 2020-01-01 RX ADMIN — FUROSEMIDE 20 MG: 20 TABLET ORAL at 08:24

## 2020-01-01 RX ADMIN — METHYLPREDNISOLONE SODIUM SUCCINATE 125 MG: 125 INJECTION, POWDER, FOR SOLUTION INTRAMUSCULAR; INTRAVENOUS at 15:03

## 2020-01-01 RX ADMIN — FAMOTIDINE 20 MG: 20 TABLET, FILM COATED ORAL at 21:14

## 2020-01-01 RX ADMIN — Medication 1 CAPSULE: at 10:46

## 2020-01-01 RX ADMIN — AZITHROMYCIN MONOHYDRATE 500 MG: 250 TABLET ORAL at 08:24

## 2020-01-01 RX ADMIN — HYDROXYZINE HYDROCHLORIDE 10 MG: 10 TABLET, FILM COATED ORAL at 11:04

## 2020-01-01 RX ADMIN — HYDROCODONE BITARTRATE AND ACETAMINOPHEN 1 TABLET: 5; 325 TABLET ORAL at 10:43

## 2020-01-01 RX ADMIN — IPRATROPIUM BROMIDE AND ALBUTEROL SULFATE 1 AMPULE: .5; 3 SOLUTION RESPIRATORY (INHALATION) at 07:27

## 2020-01-01 RX ADMIN — IPRATROPIUM BROMIDE AND ALBUTEROL SULFATE 1 AMPULE: .5; 3 SOLUTION RESPIRATORY (INHALATION) at 10:58

## 2020-01-01 RX ADMIN — HYDROXYZINE HYDROCHLORIDE 10 MG: 10 TABLET, FILM COATED ORAL at 13:52

## 2020-01-01 RX ADMIN — IPRATROPIUM BROMIDE AND ALBUTEROL SULFATE 1 AMPULE: .5; 3 SOLUTION RESPIRATORY (INHALATION) at 10:57

## 2020-01-01 RX ADMIN — MOXIFLOXACIN HYDROCHLORIDE 400 MG: 400 TABLET, FILM COATED ORAL at 08:24

## 2020-01-01 RX ADMIN — FAMOTIDINE 20 MG: 20 TABLET, FILM COATED ORAL at 19:55

## 2020-01-01 RX ADMIN — Medication 1 CAPSULE: at 07:56

## 2020-01-01 RX ADMIN — ETHAMBUTOL HYDROCHLORIDE 800 MG: 400 TABLET, FILM COATED ORAL at 08:04

## 2020-01-01 RX ADMIN — MEROPENEM 1 G: 1 INJECTION, POWDER, FOR SOLUTION INTRAVENOUS at 18:18

## 2020-01-01 RX ADMIN — Medication 10 ML: at 14:06

## 2020-01-01 RX ADMIN — HYDROCODONE BITARTRATE AND ACETAMINOPHEN 1 TABLET: 5; 325 TABLET ORAL at 07:57

## 2020-01-01 RX ADMIN — MEROPENEM 1 G: 1 INJECTION, POWDER, FOR SOLUTION INTRAVENOUS at 01:49

## 2020-01-01 RX ADMIN — GUAIFENESIN 1200 MG: 600 TABLET, EXTENDED RELEASE ORAL at 17:24

## 2020-01-01 RX ADMIN — Medication 625 MG: at 09:23

## 2020-01-01 RX ADMIN — Medication 10 ML: at 20:43

## 2020-01-01 RX ADMIN — Medication 625 MG: at 08:04

## 2020-01-01 RX ADMIN — IPRATROPIUM BROMIDE AND ALBUTEROL SULFATE 1 AMPULE: .5; 3 SOLUTION RESPIRATORY (INHALATION) at 08:06

## 2020-01-01 RX ADMIN — Medication 1 CAPSULE: at 08:08

## 2020-01-01 RX ADMIN — IPRATROPIUM BROMIDE AND ALBUTEROL SULFATE 1 AMPULE: .5; 3 SOLUTION RESPIRATORY (INHALATION) at 11:18

## 2020-01-01 RX ADMIN — AZITHROMYCIN MONOHYDRATE 500 MG: 250 TABLET ORAL at 08:04

## 2020-01-01 RX ADMIN — MEROPENEM 1 G: 1 INJECTION, POWDER, FOR SOLUTION INTRAVENOUS at 08:25

## 2020-01-01 RX ADMIN — MEROPENEM 1 G: 1 INJECTION, POWDER, FOR SOLUTION INTRAVENOUS at 17:30

## 2020-01-01 RX ADMIN — LEVOFLOXACIN 750 MG: 750 TABLET, FILM COATED ORAL at 08:43

## 2020-01-01 RX ADMIN — ENOXAPARIN SODIUM 40 MG: 40 INJECTION SUBCUTANEOUS at 18:17

## 2020-01-01 RX ADMIN — HYDROXYZINE HYDROCHLORIDE 10 MG: 10 TABLET, FILM COATED ORAL at 18:21

## 2020-01-01 RX ADMIN — AMIKACIN SULFATE 500 MG: 250 INJECTION, SOLUTION INTRAMUSCULAR; INTRAVENOUS at 19:55

## 2020-01-01 RX ADMIN — MEROPENEM 1 G: 1 INJECTION, POWDER, FOR SOLUTION INTRAVENOUS at 08:23

## 2020-01-01 RX ADMIN — HYDROCODONE BITARTRATE AND ACETAMINOPHEN 1 TABLET: 7.5; 325 TABLET ORAL at 17:24

## 2020-01-01 RX ADMIN — MEROPENEM 1 G: 1 INJECTION, POWDER, FOR SOLUTION INTRAVENOUS at 16:56

## 2020-01-01 RX ADMIN — IPRATROPIUM BROMIDE AND ALBUTEROL SULFATE 1 AMPULE: .5; 3 SOLUTION RESPIRATORY (INHALATION) at 07:12

## 2020-01-01 RX ADMIN — Medication 1 CAPSULE: at 09:45

## 2020-01-01 RX ADMIN — AZITHROMYCIN MONOHYDRATE 500 MG: 250 TABLET ORAL at 18:26

## 2020-01-01 RX ADMIN — IPRATROPIUM BROMIDE AND ALBUTEROL SULFATE 1 AMPULE: .5; 3 SOLUTION RESPIRATORY (INHALATION) at 05:57

## 2020-01-01 RX ADMIN — FAMOTIDINE 20 MG: 20 TABLET, FILM COATED ORAL at 08:05

## 2020-01-01 RX ADMIN — GUAIFENESIN 1200 MG: 600 TABLET, EXTENDED RELEASE ORAL at 17:12

## 2020-01-01 RX ADMIN — IPRATROPIUM BROMIDE AND ALBUTEROL SULFATE 1 AMPULE: .5; 3 SOLUTION RESPIRATORY (INHALATION) at 17:46

## 2020-01-01 RX ADMIN — MEROPENEM 1 G: 1 INJECTION, POWDER, FOR SOLUTION INTRAVENOUS at 21:14

## 2020-01-01 RX ADMIN — Medication 1 CAPSULE: at 09:09

## 2020-01-01 RX ADMIN — MEROPENEM 1 G: 1 INJECTION, POWDER, FOR SOLUTION INTRAVENOUS at 01:22

## 2020-01-01 RX ADMIN — IPRATROPIUM BROMIDE AND ALBUTEROL SULFATE 1 AMPULE: .5; 3 SOLUTION RESPIRATORY (INHALATION) at 14:54

## 2020-01-01 RX ADMIN — HYDROCODONE BITARTRATE AND ACETAMINOPHEN 1 TABLET: 5; 325 TABLET ORAL at 13:49

## 2020-01-01 RX ADMIN — RIFAMPIN 600 MG: 300 CAPSULE ORAL at 08:04

## 2020-01-01 RX ADMIN — IPRATROPIUM BROMIDE AND ALBUTEROL SULFATE 1 AMPULE: .5; 3 SOLUTION RESPIRATORY (INHALATION) at 14:55

## 2020-01-01 RX ADMIN — FAMOTIDINE 20 MG: 20 TABLET, FILM COATED ORAL at 20:04

## 2020-01-01 RX ADMIN — AZITHROMYCIN MONOHYDRATE 500 MG: 250 TABLET ORAL at 10:44

## 2020-01-01 RX ADMIN — FAMOTIDINE 20 MG: 20 TABLET, FILM COATED ORAL at 09:09

## 2020-01-01 RX ADMIN — ENOXAPARIN SODIUM 40 MG: 40 INJECTION SUBCUTANEOUS at 18:26

## 2020-01-01 RX ADMIN — IPRATROPIUM BROMIDE AND ALBUTEROL SULFATE 1 AMPULE: .5; 3 SOLUTION RESPIRATORY (INHALATION) at 09:53

## 2020-01-01 RX ADMIN — IPRATROPIUM BROMIDE AND ALBUTEROL SULFATE 1 AMPULE: .5; 3 SOLUTION RESPIRATORY (INHALATION) at 19:52

## 2020-01-01 RX ADMIN — GUAIFENESIN 1200 MG: 600 TABLET, EXTENDED RELEASE ORAL at 21:13

## 2020-01-01 RX ADMIN — IPRATROPIUM BROMIDE AND ALBUTEROL SULFATE 1 AMPULE: .5; 3 SOLUTION RESPIRATORY (INHALATION) at 15:07

## 2020-01-01 RX ADMIN — IPRATROPIUM BROMIDE AND ALBUTEROL SULFATE 1 AMPULE: .5; 3 SOLUTION RESPIRATORY (INHALATION) at 10:30

## 2020-01-01 RX ADMIN — IPRATROPIUM BROMIDE AND ALBUTEROL SULFATE 1 AMPULE: .5; 3 SOLUTION RESPIRATORY (INHALATION) at 10:42

## 2020-01-01 RX ADMIN — IPRATROPIUM BROMIDE AND ALBUTEROL SULFATE 1 AMPULE: .5; 3 SOLUTION RESPIRATORY (INHALATION) at 18:48

## 2020-01-01 RX ADMIN — FUROSEMIDE 20 MG: 20 TABLET ORAL at 09:09

## 2020-01-01 RX ADMIN — HYDROXYZINE HYDROCHLORIDE 10 MG: 10 TABLET, FILM COATED ORAL at 03:44

## 2020-01-01 RX ADMIN — POSACONAZOLE 300 MG: 100 TABLET, DELAYED RELEASE ORAL at 10:45

## 2020-01-01 RX ADMIN — MOXIFLOXACIN HYDROCHLORIDE 400 MG: 400 TABLET, FILM COATED ORAL at 09:09

## 2020-01-01 RX ADMIN — GUAIFENESIN 1200 MG: 600 TABLET, EXTENDED RELEASE ORAL at 08:05

## 2020-01-01 RX ADMIN — RIFAMPIN 600 MG: 300 CAPSULE ORAL at 09:09

## 2020-01-01 RX ADMIN — Medication 10 ML: at 20:05

## 2020-01-01 RX ADMIN — Medication 625 MG: at 08:23

## 2020-01-01 RX ADMIN — MOXIFLOXACIN HYDROCHLORIDE 400 MG: 400 TABLET, FILM COATED ORAL at 07:57

## 2020-01-01 RX ADMIN — IPRATROPIUM BROMIDE AND ALBUTEROL SULFATE 1 AMPULE: .5; 3 SOLUTION RESPIRATORY (INHALATION) at 14:23

## 2020-01-01 RX ADMIN — IPRATROPIUM BROMIDE AND ALBUTEROL SULFATE 1 AMPULE: .5; 3 SOLUTION RESPIRATORY (INHALATION) at 19:38

## 2020-01-01 RX ADMIN — GUAIFENESIN 1200 MG: 600 TABLET, EXTENDED RELEASE ORAL at 16:56

## 2020-01-01 RX ADMIN — ETHAMBUTOL HYDROCHLORIDE 800 MG: 400 TABLET, FILM COATED ORAL at 08:34

## 2020-01-01 RX ADMIN — Medication 625 MG: at 07:57

## 2020-01-01 RX ADMIN — IPRATROPIUM BROMIDE AND ALBUTEROL SULFATE 1 AMPULE: .5; 3 SOLUTION RESPIRATORY (INHALATION) at 18:57

## 2020-01-01 RX ADMIN — Medication 625 MG: at 09:09

## 2020-01-01 RX ADMIN — Medication 10 ML: at 07:57

## 2020-01-01 RX ADMIN — Medication 625 MG: at 14:02

## 2020-01-01 RX ADMIN — MEROPENEM 1 G: 1 INJECTION, POWDER, FOR SOLUTION INTRAVENOUS at 17:39

## 2020-01-01 RX ADMIN — IPRATROPIUM BROMIDE AND ALBUTEROL SULFATE 1 AMPULE: .5; 3 SOLUTION RESPIRATORY (INHALATION) at 07:11

## 2020-01-01 RX ADMIN — LORAZEPAM 0.5 MG: 0.5 TABLET ORAL at 09:45

## 2020-01-01 RX ADMIN — POSACONAZOLE 300 MG: 100 TABLET, DELAYED RELEASE ORAL at 12:01

## 2020-01-01 RX ADMIN — GUAIFENESIN 1200 MG: 600 TABLET, EXTENDED RELEASE ORAL at 17:39

## 2020-01-01 RX ADMIN — GUAIFENESIN 1200 MG: 600 TABLET, EXTENDED RELEASE ORAL at 09:09

## 2020-01-01 RX ADMIN — IPRATROPIUM BROMIDE AND ALBUTEROL SULFATE 1 AMPULE: .5; 3 SOLUTION RESPIRATORY (INHALATION) at 07:21

## 2020-01-01 RX ADMIN — IPRATROPIUM BROMIDE AND ALBUTEROL SULFATE 1 AMPULE: .5; 3 SOLUTION RESPIRATORY (INHALATION) at 14:49

## 2020-01-01 RX ADMIN — PREDNISONE 10 MG: 10 TABLET ORAL at 09:45

## 2020-01-01 RX ADMIN — IPRATROPIUM BROMIDE AND ALBUTEROL SULFATE 1 AMPULE: .5; 3 SOLUTION RESPIRATORY (INHALATION) at 17:57

## 2020-01-01 RX ADMIN — Medication 10 ML: at 19:55

## 2020-01-01 RX ADMIN — MEROPENEM 1 G: 1 INJECTION, POWDER, FOR SOLUTION INTRAVENOUS at 17:23

## 2020-01-01 RX ADMIN — MEROPENEM 1 G: 1 INJECTION, POWDER, FOR SOLUTION INTRAVENOUS at 17:13

## 2020-01-01 RX ADMIN — Medication 10 ML: at 10:47

## 2020-01-01 RX ADMIN — IPRATROPIUM BROMIDE AND ALBUTEROL SULFATE 1 AMPULE: .5; 3 SOLUTION RESPIRATORY (INHALATION) at 14:50

## 2020-01-01 RX ADMIN — FUROSEMIDE 20 MG: 20 TABLET ORAL at 14:02

## 2020-01-01 RX ADMIN — MEROPENEM 1 G: 1 INJECTION, POWDER, FOR SOLUTION INTRAVENOUS at 09:09

## 2020-01-01 RX ADMIN — MEROPENEM 1 G: 1 INJECTION, POWDER, FOR SOLUTION INTRAVENOUS at 01:30

## 2020-01-01 RX ADMIN — MOXIFLOXACIN HYDROCHLORIDE 400 MG: 400 TABLET, FILM COATED ORAL at 10:44

## 2020-01-01 RX ADMIN — GUAIFENESIN 1200 MG: 600 TABLET, EXTENDED RELEASE ORAL at 17:23

## 2020-01-01 RX ADMIN — Medication 10 ML: at 21:14

## 2020-01-01 RX ADMIN — HYDROXYZINE HYDROCHLORIDE 10 MG: 10 TABLET, FILM COATED ORAL at 18:20

## 2020-01-01 RX ADMIN — IPRATROPIUM BROMIDE AND ALBUTEROL SULFATE 1 AMPULE: .5; 3 SOLUTION RESPIRATORY (INHALATION) at 14:31

## 2020-01-01 RX ADMIN — HYDROCODONE BITARTRATE AND ACETAMINOPHEN 1 TABLET: 5; 325 TABLET ORAL at 19:40

## 2020-01-01 RX ADMIN — HYDROCODONE BITARTRATE AND ACETAMINOPHEN 1 TABLET: 5; 325 TABLET ORAL at 18:21

## 2020-01-01 RX ADMIN — MOXIFLOXACIN HYDROCHLORIDE 400 MG: 400 TABLET, FILM COATED ORAL at 08:04

## 2020-01-01 RX ADMIN — ETHAMBUTOL HYDROCHLORIDE 800 MG: 400 TABLET, FILM COATED ORAL at 10:45

## 2020-01-01 ASSESSMENT — PAIN DESCRIPTION - DESCRIPTORS
DESCRIPTORS: CONSTANT
DESCRIPTORS: CONSTANT;ACHING
DESCRIPTORS: CONSTANT

## 2020-01-01 ASSESSMENT — ENCOUNTER SYMPTOMS
BACK PAIN: 0
DIARRHEA: 0
VOMITING: 0
SHORTNESS OF BREATH: 1
NAUSEA: 0
RHINORRHEA: 0
COUGH: 1
VOMITING: 0
VOMITING: 0
CONSTIPATION: 0
BLOOD IN STOOL: 0
COUGH: 1
NAUSEA: 0
DIARRHEA: 0
SHORTNESS OF BREATH: 1
COUGH: 1
CONSTIPATION: 0
DIARRHEA: 0
NAUSEA: 0
EYE PAIN: 0
DIARRHEA: 0
VOMITING: 0
ABDOMINAL DISTENTION: 0
BACK PAIN: 0
CHEST TIGHTNESS: 0
ABDOMINAL DISTENTION: 0
SHORTNESS OF BREATH: 1
SHORTNESS OF BREATH: 1
DIARRHEA: 0
VOMITING: 0
BLOOD IN STOOL: 0
NAUSEA: 0
CHEST TIGHTNESS: 1
EYE ITCHING: 0
SHORTNESS OF BREATH: 1
COUGH: 1
SORE THROAT: 0
NAUSEA: 0
NAUSEA: 0
SHORTNESS OF BREATH: 1
SHORTNESS OF BREATH: 1
COUGH: 1
COLOR CHANGE: 0
VOMITING: 0
VOMITING: 0
WHEEZING: 1
COUGH: 1
EYE DISCHARGE: 0
DIARRHEA: 0
VOMITING: 0
DIARRHEA: 0
COUGH: 1
NAUSEA: 0
RHINORRHEA: 0
COUGH: 0
TROUBLE SWALLOWING: 0
SHORTNESS OF BREATH: 1
ABDOMINAL PAIN: 0
NAUSEA: 0
SINUS PAIN: 0
DIARRHEA: 0

## 2020-01-01 ASSESSMENT — PAIN - FUNCTIONAL ASSESSMENT
PAIN_FUNCTIONAL_ASSESSMENT: PREVENTS OR INTERFERES SOME ACTIVE ACTIVITIES AND ADLS
PAIN_FUNCTIONAL_ASSESSMENT: ACTIVITIES ARE NOT PREVENTED

## 2020-01-01 ASSESSMENT — PAIN SCALES - GENERAL
PAINLEVEL_OUTOF10: 9
PAINLEVEL_OUTOF10: 2
PAINLEVEL_OUTOF10: 6
PAINLEVEL_OUTOF10: 0
PAINLEVEL_OUTOF10: 10
PAINLEVEL_OUTOF10: 6
PAINLEVEL_OUTOF10: 6
PAINLEVEL_OUTOF10: 0
PAINLEVEL_OUTOF10: 0
PAINLEVEL_OUTOF10: 3
PAINLEVEL_OUTOF10: 0
PAINLEVEL_OUTOF10: 4
PAINLEVEL_OUTOF10: 4
PAINLEVEL_OUTOF10: 2
PAINLEVEL_OUTOF10: 6
PAINLEVEL_OUTOF10: 7
PAINLEVEL_OUTOF10: 5
PAINLEVEL_OUTOF10: 3
PAINLEVEL_OUTOF10: 6
PAINLEVEL_OUTOF10: 4
PAINLEVEL_OUTOF10: 3
PAINLEVEL_OUTOF10: 4
PAINLEVEL_OUTOF10: 8
PAINLEVEL_OUTOF10: 0
PAINLEVEL_OUTOF10: 0
PAINLEVEL_OUTOF10: 8
PAINLEVEL_OUTOF10: 0
PAINLEVEL_OUTOF10: 8
PAINLEVEL_OUTOF10: 7
PAINLEVEL_OUTOF10: 10
PAINLEVEL_OUTOF10: 6

## 2020-01-01 ASSESSMENT — PAIN DESCRIPTION - PROGRESSION
CLINICAL_PROGRESSION: NOT CHANGED

## 2020-01-01 ASSESSMENT — PAIN DESCRIPTION - LOCATION
LOCATION: RIB CAGE
LOCATION: RIB CAGE;SACRUM
LOCATION: RIB CAGE;SACRUM

## 2020-01-01 ASSESSMENT — PAIN DESCRIPTION - ONSET
ONSET: ON-GOING
ONSET: ON-GOING

## 2020-01-01 ASSESSMENT — PAIN DESCRIPTION - ORIENTATION
ORIENTATION: LEFT
ORIENTATION: LEFT

## 2020-01-01 ASSESSMENT — PAIN DESCRIPTION - PAIN TYPE
TYPE: CHRONIC PAIN

## 2020-01-01 ASSESSMENT — PAIN DESCRIPTION - FREQUENCY
FREQUENCY: CONTINUOUS
FREQUENCY: CONTINUOUS

## 2020-01-08 NOTE — CARE COORDINATION
[facility] Transitions Follow Up Call    2020    Patient: Judson Holt  Patient : 1960   MRN: <[id number]>  Reason for Admission: Pneumonia  Discharge Date: 19 RARS: Readmission Risk Score: 6         Spoke with: Patient    Patient states he is weak and tired. Appetite poor - encouraged to eat small meals frequently. Has productive cough - gray mucous. Continues taking antibiotic at this time. Patient states is taking medications as directed and has no questions concerning medications at this time. Patient states knows when to contact physician with changes or concerns or when to go to ER if necessary. Will Follow up at later time. Care Transitions Subsequent and Final Call    Subsequent and Final Calls  Do you have any ongoing symptoms?:  Yes  Patient-reported symptoms:  Weakness  Have your medications changed?:  No  Do you have any questions related to your medications?:  No  Do you currently have any active services?:  Yes  Are you currently active with any services?:  Home Health  Do you have any needs or concerns that I can assist you with?:  No  Care Transitions Interventions                          Other Interventions: Follow Up  No future appointments.     Will Trevizo LPN

## 2020-01-15 NOTE — PROGRESS NOTES
Contains abnormal data Blood Gas, Arterial   Status:  Final result    Ref Range & Units 01/15/20 1535   pH, Arterial 7.350 - 7.450 7.470High     pCO2, Arterial 35.0 - 45.0 mmHg 38.0    pO2, Arterial 80.0 - 100.0 mmHg 65. 0Low     HCO3, Arterial 22.0 - 26.0 mmol/L 27.7High     Base Excess, Arterial -2.0 - 2.0 mmol/L 3.8High     Hemoglobin, Art, Extended 14.0 - 18.0 g/dL 10.5Low     O2 Sat, Arterial >92 % 93.5    Carboxyhgb, Arterial 0.0 - 5.0 % 2.3    Comment:      0.0-1.5   (Smokers 1.5-5.0)    Methemoglobin, Arterial <1.5 % 1.0    O2 Content, Arterial Not Established mL/dL 13.9    O2 Therapy  Unknown    Resulting Agency  [facility]        Specimen Collected: 01/15/20 1535 Last Resulted: 01/15/20 1536 View Other Order Details        Pt on 3 lpm RR 26 site RB

## 2020-01-15 NOTE — ED PROVIDER NOTES
[facility] [address]      Pt Name: Lan Sam  MRN: [id number]  [place] 1960  Date of evaluation: 1/15/2020  Provider: Ana Maria De León MD    CHIEF COMPLAINT       Chief Complaint   Patient presents with    Shortness of Breath       HISTORY OF PRESENT ILLNESS   (Location/Symptom, Timing/Onset, Context/Setting, Quality, Duration, Modifying Factors, Severity)  Note limiting factors. Lan Sam is a 62 y.o. male who presents to the emergency department with     Shortness of breath. Ongoing for approximately 1 week. Also with generalized weakness. Denies chest pain. States associated cough with dark phlegm coming up. No longer a smoker. On home oxygen. Has increase his home O2 from 2 L to 3 L. Also we because he feels like he cannot eat because of the antibiotics that is on for a chest infection that they thought was TB. Denies fevers. Nothing makes his difficulty breathing better. Nothing makes it worse. Nursing Notes were reviewed. REVIEW OF SYSTEMS    (2-9 systems for level 4,10 or more for level 5)     Review of Systems   Constitutional: Positive for appetite change. Negative for activity change, chills and fever. HENT: Negative for congestion, ear pain, nosebleeds, rhinorrhea, sore throat and trouble swallowing. Respiratory: Positive for shortness of breath. Negative for cough and chest tightness. Cardiovascular: Negative for chest pain and palpitations. Gastrointestinal: Negative for abdominal distention, abdominal pain, blood in stool, constipation, diarrhea, nausea and vomiting. Genitourinary: Negative for difficulty urinating, dysuria and hematuria. Musculoskeletal: Negative for arthralgias, back pain, joint swelling, myalgias and neck pain. Skin: Negative for rash. Neurological: Positive for weakness. Negative for dizziness, light-headedness and headaches.    Psychiatric/Behavioral: Negative for confusion, hallucinations, commands  [place] Coma Scale Score: 15      PHYSICAL EXAM    (up to 7 for level 4, 8 or more for level 5)     ED Triage Vitals   BP Temp Temp src Pulse Resp SpO2 Height Weight   01/15/20 1402 01/15/20 1402 -- 01/15/20 1358 01/15/20 1358 01/15/20 1358 01/15/20 1358 01/15/20 1358   117/65 99.9 °F (37.7 °C)  115 24 (!) 85 % 5' 9\" (1.753 m) 95 lb (43.1 kg)       Physical Exam  Vitals signs and nursing note reviewed. Constitutional:       General: He is not in acute distress. Appearance: He is well-developed. Comments: Thin, cachectic   HENT:      Head: Normocephalic and atraumatic. Right Ear: External ear normal.      Left Ear: External ear normal.      Nose: Nose normal.      Mouth/Throat:      Mouth: Mucous membranes are moist.      Pharynx: Oropharynx is clear. Eyes:      General: No scleral icterus. Pupils: Pupils are equal, round, and reactive to light. Neck:      Musculoskeletal: Normal range of motion and neck supple. Trachea: No tracheal deviation. Cardiovascular:      Rate and Rhythm: Normal rate and regular rhythm. Heart sounds: Normal heart sounds. No murmur. No friction rub. No gallop. Pulmonary:      Effort: Pulmonary effort is normal. No respiratory distress. Breath sounds: Examination of the right-upper field reveals wheezing. Examination of the left-upper field reveals wheezing. Examination of the right-middle field reveals wheezing. Examination of the left-middle field reveals wheezing. Examination of the right-lower field reveals wheezing. Examination of the left-lower field reveals wheezing. Wheezing present. No rales. Comments: Scant expiratory wheezes  Chest:      Chest wall: No tenderness. Abdominal:      General: There is no distension. Palpations: Abdomen is soft. There is no mass. Tenderness: There is no tenderness. There is no guarding or rebound. Musculoskeletal: Normal range of motion. General: No tenderness or deformity. Skin:     General: Skin is warm and dry. Findings: No rash. Neurological:      Mental Status: He is alert and oriented to person, place, and time. Cranial Nerves: No cranial nerve deficit. Motor: No abnormal muscle tone. Coordination: Coordination normal.   Psychiatric:         Behavior: Behavior normal.         Thought Content: Thought content normal.         Judgment: Judgment normal.         DIAGNOSTIC RESULTS     EKG:All EKG's are interpreted by the Emergency Department Physician who either signs or Co-signs this chart in the absence of a cardiologist.    Sinus tachycardia at a rate of 112 bpm diffuse artifact in the baseline of leads I, 2, 3, aVR, aVL, aVF. No obvious ST elevations or depressions within contiguous leads consistent with STEMI or ischemia normal axis and intervals otherwise. RADIOLOGY:   Non-plain film images such as CT, Ultrasound and MRI are read by theradiologist. Plain radiographic images are visualized and preliminarily interpreted by the emergency physician with the below findings:      XR CHEST STANDARD (2 VW)   Final Result   1. New infiltrate in the right middle and right lower lobes. Most   likely this is pneumonia superimposed on severe COPD   2. Small posterior pleural effusion is present. Pulmonary vascular   congestion may also be present.    Signed by Dr Ramakrishna Almeida on 1/15/2020 2:53 PM          LABS:  Labs Reviewed   CBC WITH AUTO DIFFERENTIAL - Abnormal; Notable for the following components:       Result Value    WBC 14.5 (*)     RBC 4.43 (*)     Hemoglobin 11.8 (*)     Hematocrit 38.8 (*)     MCH 26.6 (*)     MCHC 30.4 (*)     Platelets 522 (*)     Neutrophils % 89.5 (*)     Lymphocytes % 2.5 (*)     Neutrophils Absolute 13.0 (*)     Lymphocytes Absolute 0.4 (*)     Monocytes Absolute 1.00 (*)     All other components within normal limits   BASIC METABOLIC PANEL W/ REFLEX TO MG FOR LOW K - Abnormal; Notable for the following components:    Sodium 129 (*)     Chloride 89 (*)     Glucose 116 (*)     All other components within normal limits   APTT - Abnormal; Notable for the following components:    aPTT 37.5 (*)     All other components within normal limits   D-DIMER, QUANTITATIVE - Abnormal; Notable for the following components:    D-Dimer, Quant 3.52 (*)     All other components within normal limits   BLOOD GAS, ARTERIAL - Abnormal; Notable for the following components:    pH, Arterial 7.470 (*)     pO2, Arterial 65.0 (*)     HCO3, Arterial 27.7 (*)     Base Excess, Arterial 3.8 (*)     Hemoglobin, Art, Extended 10.5 (*)     All other components within normal limits   LACTIC ACID, PLASMA - Abnormal; Notable for the following components:    Lactic Acid 2.9 (*)     All other components within normal limits    Narrative:     CALL  [facility]  [facility] tel. ,  Chemistry results called to and read back by Katy Chahal RN in ED, 01/15/2020  17:39, by Tita Moy 89 #1   CULTURE BLOOD #2   TROPONIN   PROTIME-INR   POTASSIUM, WHOLE BLOOD   PROCALCITONIN     other labs were within normal range or not returned as of this dictation. EMERGENCY DEPARTMENT COURSE and DIFFERENTIAL DIAGNOSIS/MDM:   Vitals:    Vitals:    01/15/20 1600 01/15/20 1700 01/15/20 1800 01/15/20 1911   BP: 104/60 110/67 110/65 (!) 86/57   Pulse: 106 102 106 102   Resp: 22 22 22 16   Temp:   97.4 °F (36.3 °C) 97.6 °F (36.4 °C)   TempSrc:   Temporal Temporal   SpO2: 91% 91% 95% 98%   Weight:       Height:           MDM  Number of Diagnoses or Management Options  Diagnosis management comments: 55-year-old male history of COPD presenting with difficulty breathing ongoing for approximately a week. On antibiotics for possible TB-like infection in the chest.  Plan for IV, labs, chest x-ray, EKG, ABG, steroids, nebs, dimer and reevaluation. ED Course       Patient was evaluated for above stated complaints. Spoke with Dr. Fredrick Dominguez after the x-ray revealed new lung infiltrates.   Started on

## 2020-01-15 NOTE — ED NOTES
Suleman Parada paged @ [phone]. Suleman Parada returned call to [address] [place] [address] @ [phone].  Electronically signed by Yamileth Fuller on 1/15/2020 at 5:26 PM       Yamileth Fuller  01/15/20 5335

## 2020-01-16 PROBLEM — A31.9 MYCOBACTERIUM INFECTION: Chronic | Status: ACTIVE | Noted: 2020-01-01

## 2020-01-16 NOTE — CONSULTS
Pulmonary and Critical Care Consult Note  [address] [facility]    MR# [id number]    Acct# [de-identified]  1/16/2020   8:46 AM    Referring Rambo Jacobo MD  Chief Complaint: Shortness of breath, pneumonia    HPI: We have been consulted to see this 62y.o. year old male born on 1960. Patient complains of moderate shortness of breath in the mid chest for 2 days, aggravated by exertion and alleviated by rest, oxygen and medication(s) (albuterol and atrovent), and associated with chills, myalgias, productive cough and shortness of breath. We have been asked to see him and help manage his condition. He is known to our office. He is followed by Aleksandra Camacho and Dr. Juan F Bansal. His chronic respiratory failure, COPD and MAC. Followed by Dr. Lupe Hoyos for his MAC and is on a multi antibiotic regimen. Last admitted to this facility and discharged on December 20, 2019. He is on 2 L of oxygen at baseline. He is on Spiriva, Advair and Ventolin at baseline. He has a nebulizer he reports he typically uses 2-3 times a day. He reports he has had weight loss and poor appetite over the last few weeks. The last 2 days he has had chills, increased coughing, chest tightness and worsening shortness of breath. He reports he is \"coughing up the same old stuff I always cough up\". He reports having to increase his oxygen to 3 L when he mobilizes the last couple of days. He reports he has been taking these antibiotics for his MAC for several weeks. He reports \"I just do not understand why I am not better already\". Denies any other symptoms however he indicates every time he comes into the hospital he gets severe reflux and esophageal spasms. He would like something to be started to prevent that this time. He also indicates he feels he is getting too weak and unable to care for himself.   He anticipates going to a nursing home or rehab facility temporarily after discharge to improve his current state. No family at the bedside. Past Medical History      Past Medical History:   Diagnosis Date    Alcohol abuse     Anxiety     Colon polyp     COPD [facility]ch[facility] obstructive [facility] disease) ([facility] [address]     COPD exacerbation (HCC)     Hyperlipidemia     Hypertension     Mycobacterium avium complex colonization     Palliative care patient 12/03/2018     SurgicalHistory  Past Surgical History:   Procedure Laterality Date    CARPAL TUNNEL RELEASE      COLONOSCOPY  08/23/2012    : moderate diverticulosis and small polyp     Allergies  Allergies   Allergen Reactions    Pcn [Penicillins]      Medications    sodium chloride flush, 10 mL, Intravenous, 2 times per day    enoxaparin, 40 mg, Subcutaneous, Daily    ethambutol, 800 mg, Oral, Daily    guaiFENesin, 1,200 mg, Oral, BID    lactobacillus, 1 capsule, Oral, Daily    levofloxacin, 750 mg, Oral, Daily    rifampin, 600 mg, Oral, Daily    Roflumilast, 500 mcg, Oral, Daily    ipratropium-albuterol, 1 ampule, Inhalation, Q4H WA    meropenem, 1 g, Intravenous, Q12H   Social History   reports that he quit smoking about 13 months ago. His smoking use included cigarettes. He has a 15.00 pack-year smoking history. His smokeless tobacco use includes snuff. He reports that he does not drink alcohol or use drugs. Family History  family history includes Colon Cancer in his mother; Colon Polyps in his mother; Diabetes in his father; Emphysema in his father; Prostate Cancer in his paternal grandfather. Review of Systems:  Review of Systems   Constitutional: Positive for activity change, appetite change, chills, fatigue and unexpected weight change. Negative for diaphoresis and fever. HENT: Positive for congestion. Negative for nosebleeds, postnasal drip, rhinorrhea and sinus pain. Eyes: Negative for pain, discharge and itching. Respiratory: Positive for cough, chest tightness, shortness of breath and wheezing.     Cardiovascular: Positive for chest pain. Negative for palpitations and leg swelling. Gastrointestinal: Negative for abdominal distention, blood in stool, constipation, diarrhea, nausea and vomiting. GERD   Endocrine: Negative for polydipsia, polyphagia and polyuria. Genitourinary: Negative for difficulty urinating, frequency, hematuria and urgency. Musculoskeletal: Positive for gait problem. Negative for arthralgias, back pain and joint swelling. Skin: Negative for color change, pallor and rash. Allergic/Immunologic: Negative for environmental allergies, food allergies and immunocompromised state. Neurological: Positive for weakness. Negative for dizziness, speech difficulty and light-headedness. Hematological: Negative for adenopathy. Does not bruise/bleed easily. Psychiatric/Behavioral: Negative for agitation and hallucinations. The patient is nervous/anxious. The patient is not hyperactive. Physical Exam:   height is 5' 9\" (1.753 m) and weight is 95 lb (43.1 kg). His temporal temperature is 97.1 °F (36.2 °C). His blood pressure is 107/71 and his pulse is 110. His respiration is 19 and oxygen saturation is 93%. Intake/Output Summary (Last 24 hours) at 1/16/2020 0846  Last data filed at 1/16/2020 0304  Gross per 24 hour   Intake --   Output 300 ml   Net -300 ml     Physical Exam  Vitals signs and nursing note reviewed. Exam conducted with a chaperone present. Constitutional:       General: He is not in acute distress. Appearance: Normal appearance. He is cachectic. He is ill-appearing. He is not toxic-appearing. Interventions: Nasal cannula in place. HENT:      Head: Normocephalic and atraumatic. Right Ear: External ear normal.      Left Ear: External ear normal.      Nose: Nose normal. No congestion or rhinorrhea. Mouth/Throat:      Mouth: Mucous membranes are moist.   Eyes:      General:         Right eye: No discharge. Left eye: No discharge.       Extraocular Movements: polyp    S/P colonoscopic polypectomy    Diverticulosis of colon    Loose stools    Abdominal cramping    History of colon polyps    Abnormal CT of the abdomen    Panlobular emphysema (HCC)    Hyperlipidemia    Hypertension    Nicotine dependence, cigarettes, uncomplicated    Acute exacerbation of chronic obstructive airways disease (HCC)    COPD (chronic obstructive pulmonary disease) (HCC)    Pneumonia of right lower lobe due to Pseudomonas species (HCC)    Pneumonia    Abnormal weight loss    Severe malnutrition (HCC)    COPD exacerbation (HCC)    Palliative care patient    Chronic respiratory failure with hypoxia ([facility] [place] [address]    Community acquired pneumonia of left lower lobe of lung (HCC)    Anxiety    Chronic alcoholism in remission (HCC)    Dependence on supplemental oxygen    Former heavy tobacco smoker    Major depression in remission ([facility] [place] [address]    Personal history of tobacco use, presenting hazards to health     Pulmonary Assessment:  New problem (to me), with additional workup planned: Middle lobe and right lower lobe infiltrate    New problem (to me), no additional workup planned: Bacterium avium complex, infectious diseases been consulted    Other problems either stable, failing to improve or worsenin. Acute on chronic hypoxemic respiratory failure, on 3 L  2. Very severe COPD  3. Cachexia  4. GERD  5. Severe malnutrition    Recommend/plan:     · Continue bronchodilators  · Continue Mucinex  · Add incentive and flutter  · Supplemental oxygen for saturation between 90 and 95%  · Antibiotic recommendations per infectious disease  · Reflux therapy  · Recommend dietary consult for management of his severe nutrition issues  · May consider discontinuing Daliresp due to his malnutrition  · Agree that he would be appropriate for SNF placement upon discharge as he is continued to decline over the last year able to care for himself at home    Thank you for this consult.   We will follow  Electronically signed by Honey Valerio on 01/16/20 at 8:46 AM    Physician Substantive portion:  Patient is back in the hospital with additional pulmonary infiltrates. He has been placed on airborne isolation. He is thin with pulmonary cachexia and I would recommend stopping the Daliresp. Exam shows him to be in no distress. There is no overt wheezing with tidal breathing. He is able to speak in full sentences. He is able to converse. Recommend airway clearance devices. Antibiotics deferred to ID who has also been asked to see him. I suspect this is going to be a long protracted course with severe ongoing and chronic underlying pulmonary illness. I have seen and examined patient personally, performing a face-to-face diagnostic evaluation with plan of care reviewed and developed with APRN and nursing staff. I have addended and/or modified the above history of present illness, physical examination, and assessment and plan to reflect my findings and impressions. Essential elements of the care plan were discussed with APRN above. Agree with findings and assessment/plan as documented above.     Electronically signed by Adeel Castellanos on 1/16/2020, 1:04 PM

## 2020-01-16 NOTE — H&P
daily  albuterol sulfate HFA (VENTOLIN HFA) 108 (90 Base) MCG/ACT inhaler, Inhale 2 puffs into the lungs every 6 hours as needed for Wheezing  Roflumilast (DALIRESP) 500 MCG tablet, Take 500 mcg by mouth daily   guaiFENesin (MUCINEX) 600 MG SR tablet, Take 1 tablet by mouth 2 times daily (Patient taking differently: Take 1,200 mg by mouth 2 times daily )  Multiple Vitamin (MULTI-VITAMIN PO), Take 1 tablet by mouth daily   Tiotropium Bromide Monohydrate (SPIRIVA HANDIHALER IN), Inhale 2 puffs into the lungs daily   OXYGEN, Inhale 2 L into the lungs continuous (Patient taking differently: Inhale 3 L into the lungs continuous )    Allergies:    Pcn [penicillins]    Social History:    reports that he quit smoking about 13 months ago. His smoking use included cigarettes. He has a 15.00 pack-year smoking history. His smokeless tobacco use includes snuff. He reports that he does not drink alcohol or use drugs. Family History:   family history includes Colon Cancer in his mother; Colon Polyps in his mother; Diabetes in his father; Emphysema in his father; Prostate Cancer in his paternal grandfather. REVIEW OF SYSTEMS:  As above in the HPI, otherwise negative    PHYSICAL EXAM:    Vitals:  /71   Pulse 110   Temp 97.1 °F (36.2 °C) (Temporal)   Resp 19   Ht 5' 9\" (1.753 m)   Wt 95 lb (43.1 kg)   SpO2 93%   BMI 14.03 kg/m²     General Appearance:  alert, cooperative, appears older than stated age and appears frail  Skin:  negatives: mobility and turgor normal  Eyes:  No gross abnormalities. Neck:  neck- supple, no mass, non-tender  Lungs:  No chest wall tenderness., Breathing Pattern: use of accessory muscles, Breath sounds: diminished breath sounds- throughout and diffuse and rales- middle left  Heart:  Heart regular rate and rhythm  Abdomen: Auscultation: Normal bowel sounds. No bruits. Palpation: No masses, tenderness or organomegally.   Extremities: pulses present in all extremities and 2+ edema of bilateral lower extremities   Neurologic:  negative    DATA:  CBC with Differential:    Lab Results   Component Value Date    WBC 14.5 01/15/2020    RBC 4.43 01/15/2020    HGB 11.8 01/15/2020    HCT 38.8 01/15/2020    HCT 46.5 02/09/2012     01/15/2020    PLT 90 02/09/2012    MCV 87.6 01/15/2020    MCH 26.6 01/15/2020    MCHC 30.4 01/15/2020    RDW 13.6 01/15/2020    LYMPHOPCT 2.5 01/15/2020    MONOPCT 6.7 01/15/2020    EOSPCT 0.8 02/09/2012    BASOPCT 0.3 01/15/2020    MONOSABS 1.00 01/15/2020    LYMPHSABS 0.4 01/15/2020    EOSABS 0.10 01/15/2020    BASOSABS 0.10 01/15/2020     CMP:    Lab Results   Component Value Date     01/15/2020     02/09/2012    K 3.9 01/15/2020    K 4.0 01/15/2020    K 4.0 02/09/2012    CL 89 01/15/2020     02/09/2012    CO2 25 01/15/2020    BUN 8 01/15/2020    CREATININE <0.5 01/15/2020    CREATININE 0.6 02/09/2012    LABGLOM >60 01/15/2020    GLUCOSE 116 01/15/2020    PROT 6.4 01/14/2020    PROT 5.7 09/03/2012    LABALBU 2.6 01/14/2020    LABALBU 4.4 02/09/2012    CALCIUM 8.8 01/15/2020    BILITOT <0.2 01/14/2020    ALKPHOS 96 01/14/2020    ALKPHOS 85 02/09/2012    AST 25 01/14/2020    ALT 27 01/14/2020       ASSESSMENT:      Patient Active Problem List   Diagnosis    Family history of colon cancer    Family history of colonic polyps    Benign colon polyp    S/P colonoscopic polypectomy    Diverticulosis of colon    Loose stools    Abdominal cramping    History of colon polyps    Abnormal CT of the abdomen    Panlobular emphysema ([facility] [address]    Hyperlipidemia    Hypertension    Nicotine dependence, cigarettes, uncomplicated    Acute exacerbation of chronic obstructive airways disease ([facility] [address]    COPD (chronic obstructive pulmonary disease) (HCC)    Pneumonia of right lower lobe due to Pseudomonas species (HCC)    Pneumonia    Abnormal weight loss    Severe malnutrition (HCC)    COPD exacerbation (HCC)    Palliative care patient    Chronic respiratory

## 2020-01-16 NOTE — PROGRESS NOTES
Palliative Care/Spiritual Care: Met with pt to initiate Palliate care and speak to pt about goals and code status. Pt says he has pneumonia, and COPD. Pt is known to Palliative Care. Advance Directives: Spoke with pt in depth about an AD/LW and code status pt says maybe later but is not interested at this time. Also spoke with pt about code status.  specifically asked pt if he wanted CPR or a ventilator. Pt responded that he was okay with both while he is in the hospital.         Pain/other symptoms: Pt says he is not having pain but will ask for pain medication if it is needed. Social/Spiritual: Pt says he is [employer].         Pt/family discussion r/t goals: Pt says he has an ex-wife, a son, and grandchildren. Pt says he just spoke with the SW and the SW is setting it up for him to go to [facility] for rehab. Pt says he will go for 20 days with the goal of getting his strength back. Pt's eventual goal is to be able to return home. Pt says his ex-wife helps him and buys his groceries saying he can't walk that far anymore. Goal is to return to previous quality of life at home after stay at [facility]. Provided spiritual care with sustaining presence, nurtured hope, and prayer. Pt expressed gratitude for spiritual care.     Electronically signed by Holley Pereira on 1/16/2020 at 11:39 AM

## 2020-01-16 NOTE — CARE COORDINATION
[address] Transitions Interview     2020    Patient: [place] Dejesus Patient : 1960   MRN: [id number]    RARS: Readmission Risk Score: 8         Spoke with: [place] Dejesus        Readmission Risk  Patient Active Problem List   Diagnosis    Family history of colon cancer    Family history of colonic polyps    Benign colon polyp    S/P colonoscopic polypectomy    Diverticulosis of colon    Loose stools    Abdominal cramping    History of colon polyps    Abnormal CT of the abdomen    Panlobular emphysema ([address]    Hyperlipidemia    Hypertension    Nicotine dependence, cigarettes, uncomplicated    Acute exacerbation of chronic obstructive airways disease ([address]    COPD (chronic obstructive pulmonary disease) ([address]    Pneumonia of right lower lobe due to Pseudomonas species ([address]    Pneumonia    Abnormal weight loss    Severe malnutrition (HCC)    COPD exacerbation ([address]    Palliative care patient    Chronic respiratory failure with hypoxia ([address]    Community acquired pneumonia of left lower lobe of lung ([address]    Anxiety    Chronic alcoholism in remission ([address]    Dependence on supplemental oxygen    Former heavy tobacco smoker    Major depression in remission ([address]    Personal history of tobacco use, presenting hazards to health    Mycobacterium infection       Inpatient Assessment  Care Transitions Summary    Care Transitions Inpatient Review  Medication Review  Do you have all of your prescriptions and are they filled?:  Yes   Are you able to afford your medications?:  Yes  How often do you have difficulty taking your medications?:  I always take them as prescribed.   Housing Review  Who do you live with?:  Alone  Are you an active caregiver in your home?:  No  Social Support  Do you have a ?:  No  Do you have a [address] [place] [address]?:  Yes  [facility] Name:  [address] [place] [address] Equipment  Patient Home Equipment:  Oxygen  Functional

## 2020-01-16 NOTE — CONSULTS
CONSULTATION NOTE :ID       Patient - Elisabeth Mac,  Age - 62 y.o.    - 1960      Room Number - 1327/665-38   N -  [id number]   Acct # - [de-identified]  Date of Admission -  1/15/2020  1:57 PM  Patient's PCP: Rinaldo Epley, MD     Requesting Physician: Rinaldo Epley, MD    REASON FOR CONSULTATION     patient know to them being treated for non-tuburculosis mycobacterium infection  HISTORY OF PRESENT ILLNESS       This is a very pleasant 62 y.o. male who was admitted to the hospital with a chief complaints of Shortness of breath. Patient has been seen by Dr. 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 [place] [facility] and latest word is that on , we will hopefully have susceptibility data. Additionally there was a specimen from [facility] sent in December that is at the [facility] lab currently. The patient did not keep his follow-up with Dr. Scott Cordova and he states he did not follow-up with Dr. David Foster either because he was too ill to get out. He presented to the emergency department has been found to have new infiltrate. He denies any fever, he feels like he is continued to lose weight as he has a poor appetite and he has a productive cough. The emergency department physician gave the patient a dose of ceftriaxone.   When they contacted Dr. Scott Cordova, he pointed out that the patient had COPD, atypical mycobacterial infection and was recently hospitalized as well as the fact that he had Pseudomonas before therefore he was placed on non-community-acquired pneumonia antibiotics to include meropenem    PAST MEDICAL AND SURGICAL HISTORY       Past Medical History:   Diagnosis Date    Alcohol abuse     Anxiety     Colon polyp     [facility]     [facility]     Hyperlipidemia     Hypertension     Palliative care patient 2018       Past Surgical History:   Procedure Laterality Date    CARPAL TUNNEL RELEASE      COLONOSCOPY  2012    : moderate diverticulosis and small polyp         MEDICATIONS :       Scheduled Meds:   famotidine  20 mg Oral BID    sodium chloride flush  10 mL Intravenous 2 times per day    enoxaparin  40 mg Subcutaneous Daily    ethambutol  800 mg Oral Daily    guaiFENesin  1,200 mg Oral BID    lactobacillus  1 capsule Oral Daily    levofloxacin  750 mg Oral Daily    rifampin  600 mg Oral Daily    ipratropium-albuterol  1 ampule Inhalation Q4H WA    meropenem  1 g Intravenous Q12H     Continuous Infusions:  PRN Meds:sodium chloride flush, acetaminophen, albuterol sulfate HFA    ALLERGIES:      Pcn [penicillins]      SOCIAL HISTORY:     TOBACCO:   reports that he quit smoking about 13 months ago. His smoking use included cigarettes. He has a 15.00 pack-year smoking history. His smokeless tobacco use includes snuff. ETOH:   reports no history of alcohol use. Patient currently lives home    FAMILY HISTORY:         Problem Relation Age of Onset    Colon Cancer Mother     Colon Polyps Mother     Emphysema Father     Diabetes Father     Prostate Cancer Paternal Grandfather     Esophageal Cancer Neg Hx     Liver Cancer Neg Hx     Liver Disease Neg Hx     Rectal Cancer Neg Hx        REVIEW OF SYSTEMS:     Constitutional: no fever, positive for  fatigue  Ears: mild hearing difficulty  Mouth/throat: no  dysphagia  Lungs:  Cough and shortness of breath  CVS: no palpitation, no chest pain, LE swelling    Remainder negative    PHYSICAL EXAM:     BP (!) 100/59 Comment: manual bp  Pulse 103   Temp 99.6 °F (37.6 °C)   Resp 20   Ht 5' 9\" (1.753 m)   Wt 95 lb (43.1 kg)   SpO2 95%   BMI 14.03 kg/m²  Temp (24hrs), Av.3 °F (36.8 °C), Min:97.1 °F (36.2 °C), Max:99.6 °F (37.6 °C)    General:  Sitting up in bed. HEENT: Sclera anicteric and noninjected.   O2 per nasal cannula is in place  Neck: Supple  Heart: Very diminished heart tones, rate around 100 and regular  Lungs: Very diminished breath sounds throughout anteriorly and posteriorly  Abdomen: Soft, bowel sounds are positive, no mass appreciated  Extremities: Pitting pedal and ankle edema bilaterally. Skin: Warm and dry  Neuro: Alert, oriented, speech clear, moves extremities without difficulty  Psych: Flat affect. Unchanged from when I have met him previously. LABS:     CBC with DIFF:   Recent Labs     01/14/20  1400 01/15/20  1505   WBC 12.4* 14.5*   RBC 4.07* 4.43*   HGB 10.8* 11.8*   HCT 34.4* 38.8*   MCV 84.5 87.6   MCH 26.5* 26.6*   MCHC 31.4* 30.4*   RDW 13.5 13.6    404*   MPV 10.0 9.5   NEUTOPHILPCT 78.6* 89.5*   LYMPHOPCT 5.7* 2.5*   MONOPCT 13.7* 6.7   EOSRELPCT 1.0 0.4   BASOPCT 0.4 0.3   NEUTROABS 9.7* 13.0*   LYMPHSABS 0.7* 0.4*   MONOSABS 1.70* 1.00*   EOSABS 0.10 0.10   BASOSABS 0.10 0.10       CMP/BMP:  Recent Labs     01/14/20  1400 01/15/20  1505 01/15/20  1535   * 129*  --    K 4.0 4.0 3.9   CL 90* 89*  --    CO2 28 25  --    ANIONGAP 12 15  --    GLUCOSE 100 116*  --    BUN 7* 8  --    CREATININE <0.5 <0.5  --    LABGLOM >60 >60  --    CALCIUM 8.5* 8.8  --    PROT 6.4*  --   --    LABALBU 2.6*  --   --    BILITOT <0.2  --   --    ALKPHOS 96  --   --    ALT 27  --   --    AST 25  --   --           Culture: No results for input(s): BC, BLOODCULT2, ORG in the last 72 hours. IMAGING:   Xr Chest Standard (2 Vw)    Result Date: 1/15/2020  XR CHEST (2 VW) 1/15/2020 1:15 PM HISTORY: Cough and difficulty breathing COMPARISON: December 14, 2019. FINDINGS: Marked hyperinflation flattening diaphragms noted consistent with COPD. Chronic fibrotic changes present in the pulmonary parenchyma. A new airspace filling infiltrates present in the right lung base. This also involves the middle lobe and right lower lobe. Small right pleural effusion is present. Left lung is clear. . The cardiomediastinal silhouette and pulmonary vascularity are within normal limits. The osseous structures and surrounding soft tissues demonstrate no acute abnormality. 1. New infiltrate in the right middle and right lower lobes. Most likely this is pneumonia superimposed on severe COPD 2. Small posterior pleural effusion is present. Pulmonary vascular congestion may also be present. Signed by Dr Sherwin Medina on 1/15/2020 2:53 PM    Personally reviewed/ajf      ASSESSMENT AND PLAN     Patient Active Problem List   Diagnosis    Family history of colon cancer    Family history of colonic polyps    Benign colon polyp    S/P colonoscopic polypectomy    Diverticulosis of colon    Loose stools    Abdominal cramping    History of colon polyps    Abnormal CT of the abdomen    Panlobular emphysema ([address]    Hyperlipidemia    Hypertension    Nicotine dependence, cigarettes, uncomplicated    Acute exacerbation of chronic obstructive airways disease ([address]    COPD (chronic obstructive pulmonary disease) ([address]    Pneumonia of right lower lobe due to Pseudomonas species (HCC)    Pneumonia    Abnormal weight loss    Severe malnutrition (HCC)    COPD exacerbation ([address]    Palliative care patient    Chronic respiratory failure with hypoxia ([address]    Community acquired pneumonia of left lower lobe of lung ([address]    Anxiety    Chronic alcoholism in remission ([address]    Dependence on supplemental oxygen    Former heavy tobacco smoker    Major depression in remission ([address]    Personal history of tobacco use, presenting hazards to health    Mycobacterium infection     Right Middle and lower lobe infiltrates new from admission in December. Obtain sputum specimen for routine culture     Agree with coverage with meropenem     Hold on addition of any MRSA coverage at this time unless there is any progression or significant MRSA noted   in sputum culture.     Atypical Mycobacterium infection ( M xenopi)       Add back

## 2020-01-16 NOTE — PLAN OF CARE
Problem: Falls - Risk of:  Goal: Will remain free from falls  Description  Will remain free from falls  Outcome: Ongoing  Goal: Absence of physical injury  Description  Absence of physical injury  Outcome: Ongoing     Problem: Infection:  Goal: Will remain free from infection  Description  Will remain free from infection  Outcome: Ongoing     Problem: Safety:  Goal: Free from accidental physical injury  Description  Free from accidental physical injury  Outcome: Ongoing  Goal: Free from intentional harm  Description  Free from intentional harm  Outcome: Ongoing     Problem: Daily Care:  Goal: Daily care needs are met  Description  Daily care needs are met  Outcome: Ongoing     Problem: Pain:  Goal: Patient's pain/discomfort is manageable  Description  Patient's pain/discomfort is manageable  Outcome: Ongoing     Problem: Skin Integrity:  Goal: Skin integrity will stabilize  Description  Skin integrity will stabilize  Outcome: Ongoing     Problem: Discharge Planning:  Goal: Patients continuum of care needs are met  Description  Patients continuum of care needs are met  Outcome: Ongoing     Problem: Discharge Planning:  Goal: Discharged to appropriate level of care  Description  Discharged to appropriate level of care  Outcome: Ongoing  Goal: Participates in care planning  Description  Participates in care planning  Outcome: Ongoing     Problem: Airway Clearance - Ineffective:  Goal: Clear lung sounds  Description  Clear lung sounds  Outcome: Ongoing  Goal: Ability to maintain a clear airway will improve  Description  Ability to maintain a clear airway will improve  Outcome: Ongoing     Problem: Fluid Volume - Deficit:  Goal: Achieves intake and output within specified parameters  Description  Achieves intake and output within specified parameters  Outcome: Ongoing     Problem: Gas Exchange - Impaired:  Goal: Levels of oxygenation will improve  Description  Levels of oxygenation will improve  Outcome: Ongoing Problem: Hyperthermia:  Goal: Ability to maintain a body temperature in the normal range will improve  Description  Ability to maintain a body temperature in the normal range will improve  Outcome: Ongoing     Problem: Tobacco Use:  Goal: Will participate in inpatient tobacco-use cessation counseling  Description  Will participate in inpatient tobacco-use cessation counseling  Outcome: Ongoing     Problem: Activity:  Goal: Capacity to carry out activities will improve  Description  Capacity to carry out activities will improve  Outcome: Ongoing  Goal: Fatigue will decrease  Description  Fatigue will decrease  Outcome: Ongoing  Goal: Energy level will increase  Description  Energy level will increase  Outcome: Ongoing  Goal: Ability to implement measures to reduce episodes of fatigue will improve  Description  Ability to implement measures to reduce episodes of fatigue will improve  Outcome: Ongoing     Problem: Coping:  Goal: Ability to identify and develop effective coping behavior will improve  Description  Ability to identify and develop effective coping behavior will improve  Outcome: Ongoing  Goal: Ability to identify and utilize available resources and services will improve  Description  Ability to identify and utilize available resources and services will improve  Outcome: Ongoing     Problem: Risk for Impaired Skin Integrity  Goal: Tissue integrity - skin and mucous membranes  Description  Structural intactness and normal physiological function of skin and  mucous membranes. Outcome: Ongoing     Problem: Nutrition  Goal: Optimal nutrition therapy  Description  Nutrition Problem: Severe malnutrition, In context of chronic illness  Intervention: Food and/or Nutrient Delivery: Continue current diet, Start ONS  Nutritional Goals: PO 50% or more meals and ONS, wt stable or gain.        1/16/2020 1627 by Andrew Ty LPN  Outcome: Ongoing  1/16/2020 1022 by Anne Herzog, MS, RD, LD  Outcome: Ongoing

## 2020-01-17 NOTE — PROGRESS NOTES
involves the middle lobe and right lower lobe. Small right pleural effusion is present. Left lung is clear. . The cardiomediastinal silhouette and pulmonary vascularity are within normal limits. The osseous structures and surrounding soft tissues demonstrate no acute abnormality. 1. New infiltrate in the right middle and right lower lobes. Most likely this is pneumonia superimposed on severe COPD 2. Small posterior pleural effusion is present. Pulmonary vascular congestion may also be present.  Signed by Dr Greg Briscoe on 1/15/2020 2:53 PM                  Patient Active Problem List   Diagnosis    Family history of colon cancer    Family history of colonic polyps    Benign colon polyp    S/P colonoscopic polypectomy    Diverticulosis of colon    Loose stools    Abdominal cramping    History of colon polyps    Abnormal CT of the abdomen    Panlobular emphysema ([address]    Hyperlipidemia    Hypertension    Nicotine dependence, cigarettes, uncomplicated    Acute exacerbation of chronic obstructive airways disease ([address]    COPD (chronic obstructive pulmonary disease) ([address]    Pneumonia of right lower lobe due to Pseudomonas species ([address]    Pneumonia    Abnormal weight loss    Severe malnutrition (HCC)    COPD exacerbation ([address]    Palliative care patient    Chronic respiratory failure with hypoxia ([address]    Community acquired pneumonia of left lower lobe of lung ([address]    Anxiety    Chronic alcoholism in remission ([address]    Dependence on supplemental oxygen    Former heavy tobacco smoker    Major depression in remission ([address]    Personal history of tobacco use, presenting hazards to health    Mycobacterium infection       IMPRESSION:  Right middle and lower lobe pneumonia-new from December admission x-ray    Atypical Mycobacterium infection (Mycobacterium xenopi)    Severe COPD    leukocytosis-improved      RECOMMENDATIONS :  Follow up on respiratory culture-sent down early this

## 2020-01-17 NOTE — PROGRESS NOTES
Pulmonary and Critical Care Progress Note [facility]    Patient: [facility]  1960   MR# [id number]   Acct# [de-identified]  01/17/20   12:12 PM  Referring Provider: Patrick Head MD    Chief Complaint: Shortness of breath, pneumonia  Interval history: The patient is sitting up on the side of the bed eating breakfast on 2.5 L nasal cannula. He has no new pulmonary complaints. He states that he is not feeling any better yet. No family at bedside. No other aggravating or alleviating factors or associated symptoms. Medications:   famotidine, 20 mg, Oral, BID    azithromycin, 500 mg, Oral, Daily    moxifloxacin, 400 mg, Oral, Daily    sodium chloride flush, 10 mL, Intravenous, 2 times per day    enoxaparin, 40 mg, Subcutaneous, Daily    ethambutol, 800 mg, Oral, Daily    guaiFENesin, 1,200 mg, Oral, BID    lactobacillus, 1 capsule, Oral, Daily    rifampin, 600 mg, Oral, Daily    ipratropium-albuterol, 1 ampule, Inhalation, Q4H WA    meropenem, 1 g, Intravenous, Q12H   Review of Systems: Review of Systems   Constitutional: Positive for fatigue. Negative for chills and fever. Respiratory: Positive for cough and shortness of breath. Cardiovascular: Positive for chest pain. Gastrointestinal: Negative for diarrhea, nausea and vomiting. Neurological: Positive for weakness. Physical Exam:  Blood pressure 123/74, pulse 105, temperature 97.2 °F (36.2 °C), temperature source Temporal, resp. rate 24, height 5' 9\" (1.753 m), weight 95 lb (43.1 kg), SpO2 93 %. Intake/Output Summary (Last 24 hours) at 1/17/2020 1212  Last data filed at 1/17/2020 0214  Gross per 24 hour   Intake 240 ml   Output 985 ml   Net -745 ml     Physical Exam  Vitals signs and nursing note reviewed. Constitutional:       General: He is not in acute distress. Appearance: He is cachectic. He is ill-appearing. Interventions: Nasal cannula in place. HENT:      Head: Normocephalic and atraumatic. Eyes:      Pupils: Pupils are equal, round, and reactive to light. Neck:      Musculoskeletal: Normal range of motion and neck supple. Cardiovascular:      Rate and Rhythm: Normal rate and regular rhythm. Heart sounds: Normal heart sounds. Pulmonary:      Effort: Prolonged expiration present. Breath sounds: Decreased breath sounds and rhonchi (few) present. No wheezing or rales. Chest:      Chest wall: No tenderness. Abdominal:      General: Bowel sounds are normal.      Palpations: Abdomen is soft. Musculoskeletal: Normal range of motion. General: Swelling present. Skin:     General: Skin is warm and dry. Neurological:      Mental Status: He is alert and oriented to person, place, and time. Psychiatric:         Thought Content: Thought content normal.       Recent Labs     01/14/20  1400 01/15/20  1505 01/17/20  0332   WBC 12.4* 14.5* 11.6*   RBC 4.07* 4.43* 3.86*   HGB 10.8* 11.8* 10.2*   HCT 34.4* 38.8* 33.8*    404* 453*   MCV 84.5 87.6 87.6   MCH 26.5* 26.6* 26.4*   MCHC 31.4* 30.4* 30.2*   RDW 13.5 13.6 14.0      Recent Labs     01/14/20  1400 01/15/20  1505 01/15/20  1535 01/15/20  1709 01/17/20  0332   * 129*  --   --  133*   K 4.0 4.0 3.9  --  5.1*   CL 90* 89*  --   --  95*   CO2 28 25  --   --  25   BUN 7* 8  --   --  7*   CREATININE <0.5 <0.5  --   --  0.5   CALCIUM 8.5* 8.8  --   --  8.7*   GLUCOSE 100 116*  --   --  104   PHART  --   --  7.470*  --   --    [id number]  --   --  38.0  --   --    PO2ART  --   --  65.0*  --   --    [id number]  --   --  27.7*  --   --    [id number]  --   --  93.5  --   --    BEART  --   --  3.8*  --   --    AST 25  --   --   --   --    ALT 27  --   --   --   --    ALKPHOS 96  --   --   --   --    BILITOT <0.2  --   --   --   --    TROPONINI  --  0.02  --   --   --    LACTA  --   --   --  2.9*  --    INR  --  1.15  --   --   --       Recent Labs     01/15/20  1645 01/17/20  0321   BC No Growth to date.   Any change in status will be called. --    LABGRAM  --  No Epithelial Cells seen  Many WBC's (Polymorphonuclear)  No organisms seen       Radiograph:   My radiograph interpretation: none to review today  Pulmonary Assessment:  1. Middle lobe and right lower lobe infiltrate  2. Atypical Mycobacterium infection (M xenopi)  3. Acute on chronic hypoxemic respiratory failure, on 3 L  4. Very severe COPD  5. Cachexia  6. GERD  7. Severe malnutrition    Recommend:   · Continue supplemental oxygen and titrate as tolerated  · Antibiotics per ID  · Continue bronchodilators  · IS/flutter, mobilize     Electronically signed by Sobia Swanson on 1/17/2020 at 12:12 PM    Physician Substantive portion:  Pt remains very ill, able to sit up, concerned that he is not getting better quickly. Exam shows ill appearance. Wheezes and rhonchi on exam, no distress. Plan continue abx, avoid systemic steroids in setting of chronic infx, continue rt. On maximal tx. Appreciate ID. I have seen and examined patient personally, performing a face-to-face diagnostic evaluation with plan of care reviewed and developed with APRN and nursing staff. I have addended and/or modified the above history of present illness, physical examination, and assessment and plan to reflect my findings and impressions. Essential elements of the care plan were discussed with APRN above. Agree with findings and assessment/plan as documented above.     Electronically signed by Matthieu Turpin on 1/17/2020, 10:36 PM

## 2020-01-17 NOTE — PROGRESS NOTES
or lesions. Eyes:  No gross abnormalities. Neck:  neck- supple, no mass, non-tender  Lungs:  Breathing Pattern: regular, no distress and use of accessory muscles, Breath sounds: diminished breath sounds- throughout and diffuse and rales- middle left  Heart:  Heart regular rate and rhythm  Abdomen: Auscultation: Normal bowel sounds. No bruits. Palpation: No masses, tenderness or organomegally.   Extremities: 1+ edema of  bilateral lower extremities (just above ankle)  Musculoskeletal:  negative  Neurologic:  negative    CBC with Differential:    Lab Results   Component Value Date    WBC 11.6 01/17/2020    RBC 3.86 01/17/2020    HGB 10.2 01/17/2020    HCT 33.8 01/17/2020    HCT 46.5 02/09/2012     01/17/2020    PLT 90 02/09/2012    MCV 87.6 01/17/2020    MCH 26.4 01/17/2020    MCHC 30.2 01/17/2020    RDW 14.0 01/17/2020    LYMPHOPCT 2.5 01/15/2020    MONOPCT 6.7 01/15/2020    EOSPCT 0.8 02/09/2012    BASOPCT 0.3 01/15/2020    MONOSABS 1.00 01/15/2020    LYMPHSABS 0.4 01/15/2020    EOSABS 0.10 01/15/2020    BASOSABS 0.10 01/15/2020     CMP:    Lab Results   Component Value Date     01/17/2020     02/09/2012    K 5.1 01/17/2020    K 4.0 01/15/2020    K 4.0 02/09/2012    CL 95 01/17/2020     02/09/2012    CO2 25 01/17/2020    BUN 7 01/17/2020    CREATININE 0.5 01/17/2020    CREATININE 0.6 02/09/2012    LABGLOM >60 01/17/2020    GLUCOSE 104 01/17/2020    PROT 6.4 01/14/2020    PROT 5.7 09/03/2012    LABALBU 2.6 01/14/2020    LABALBU 4.4 02/09/2012    CALCIUM 8.7 01/17/2020    BILITOT <0.2 01/14/2020    ALKPHOS 96 01/14/2020    ALKPHOS 85 02/09/2012    AST 25 01/14/2020    ALT 27 01/14/2020     Last 3 Troponin:    Lab Results   Component Value Date    TROPONINI 0.02 01/15/2020    TROPONINI 0.01 12/08/2019    TROPONINI 0.03 12/08/2019     Urine Culture:  No components found for: CURINE  Blood Culture:  No components found for: CBLOOD, CFUNGUSBL  Stool Culture:  No components found for: CSTOOL    Assessment:    Principal Problem:    Pneumonia  Active Problems:    Panlobular emphysema (HCC)    Hyperlipidemia    Hypertension    Nicotine dependence, cigarettes, uncomplicated    Acute exacerbation of chronic obstructive airways disease (HCC)    Severe malnutrition (HCC)    Chronic respiratory failure with hypoxia (HCC)    Chronic alcoholism in remission ([facility] [facility]    Dependence on supplemental oxygen    Former heavy tobacco smoker    Major depression in remission ([facility] [facility]    Mycobacterium infection  Resolved Problems:    * No resolved hospital problems. *          Plan:  See orders. Add diuretic. Pain medication addressed. Continue with antibiotics per infectious disease. Pulmonary measures per respiratory disease clinic. Increase activity as tolerated. Is oxygen dependent chronically. Continue with plans for skilled nursing care likely needed at discharge. Hopefully will be well enough by the first of next week to pursue that.       Electronically signed by Orestes Lanier MD on 1/17/2020 at 1:10 PM

## 2020-01-17 NOTE — PROGRESS NOTES
Occupational Therapy  Declines therapy due to short of air. Asks us to come back another day.    Electronically signed by Collette Rosa, OT on 1/17/2020 at 1:24 PM

## 2020-01-17 NOTE — CARE COORDINATION
Pt accepted to [facility] for rehab. Can dc there once medically clear.     [facility] P: [phone] F: [phone]    Electronically signed by Bradyon Liu on 1/17/2020 at 9:13 AM

## 2020-01-17 NOTE — PROGRESS NOTES
160 lb (72.6 kg), % Ideal Body 59%  · BMI Classification: BMI <18.5 Underweight    Nutrition Interventions:   Continue current diet, Continue current ONS  Continued Inpatient Monitoring    Nutrition Evaluation:   · Evaluation: Goals set   · Goals: PO 50% or more meals and ONS, wt stable or gain.     · Monitoring: Meal Intake, Supplement Intake, Skin Integrity, Weight, Pertinent Labs      Electronically signed by Kenda Mcardle, MS, RD, LD on 1/17/20 at 10:31 AM    Contact Number: [phone]

## 2020-01-17 NOTE — PLAN OF CARE
Problem: Falls - Risk of:  Goal: Will remain free from falls  Description  Will remain free from falls  Outcome: Ongoing  Goal: Absence of physical injury  Description  Absence of physical injury  Outcome: Ongoing     Problem: Infection:  Goal: Will remain free from infection  Description  Will remain free from infection  Outcome: Ongoing     Problem: Safety:  Goal: Free from accidental physical injury  Description  Free from accidental physical injury  Outcome: Ongoing  Goal: Free from intentional harm  Description  Free from intentional harm  Outcome: Ongoing     Problem: Daily Care:  Goal: Daily care needs are met  Description  Daily care needs are met  Outcome: Ongoing     Problem: Pain:  Goal: Patient's pain/discomfort is manageable  Description  Patient's pain/discomfort is manageable  Outcome: Ongoing     Problem: Skin Integrity:  Goal: Skin integrity will stabilize  Description  Skin integrity will stabilize  Outcome: Ongoing     Problem: Discharge Planning:  Goal: Patients continuum of care needs are met  Description  Patients continuum of care needs are met  Outcome: Ongoing     Problem: Discharge Planning:  Goal: Discharged to appropriate level of care  Description  Discharged to appropriate level of care  Outcome: Ongoing  Goal: Participates in care planning  Description  Participates in care planning  Outcome: Ongoing     Problem: Airway Clearance - Ineffective:  Goal: Clear lung sounds  Description  Clear lung sounds  Outcome: Ongoing  Goal: Ability to maintain a clear airway will improve  Description  Ability to maintain a clear airway will improve  Outcome: Ongoing     Problem: Fluid Volume - Deficit:  Goal: Achieves intake and output within specified parameters  Description  Achieves intake and output within specified parameters  Outcome: Ongoing     Problem: Gas Exchange - Impaired:  Goal: Levels of oxygenation will improve  Description  Levels of oxygenation will improve  Outcome: Ongoing Problem: Hyperthermia:  Goal: Ability to maintain a body temperature in the normal range will improve  Description  Ability to maintain a body temperature in the normal range will improve  Outcome: Ongoing     Problem: Tobacco Use:  Goal: Will participate in inpatient tobacco-use cessation counseling  Description  Will participate in inpatient tobacco-use cessation counseling  Outcome: Ongoing     Problem: Activity:  Goal: Capacity to carry out activities will improve  Description  Capacity to carry out activities will improve  Outcome: Ongoing  Goal: Fatigue will decrease  Description  Fatigue will decrease  Outcome: Ongoing  Goal: Energy level will increase  Description  Energy level will increase  Outcome: Ongoing  Goal: Ability to implement measures to reduce episodes of fatigue will improve  Description  Ability to implement measures to reduce episodes of fatigue will improve  Outcome: Ongoing     Problem: Coping:  Goal: Ability to identify and develop effective coping behavior will improve  Description  Ability to identify and develop effective coping behavior will improve  Outcome: Ongoing  Goal: Ability to identify and utilize available resources and services will improve  Description  Ability to identify and utilize available resources and services will improve  Outcome: Ongoing     Problem: Risk for Impaired Skin Integrity  Goal: Tissue integrity - skin and mucous membranes  Description  Structural intactness and normal physiological function of skin and  mucous membranes. Outcome: Ongoing     Problem: Nutrition  Goal: Optimal nutrition therapy  Description  Nutrition Problem: Severe malnutrition, In context of chronic illness  Intervention: Food and/or Nutrient Delivery: Continue current diet, Continue current ONS  Nutritional Goals: PO 50% or more meals and ONS, wt stable or gain.         1/17/2020 1230 by Goldy Marquez RN  Outcome: Ongoing  1/17/2020 1033 by Morgan Hanson, MS, RD, LD  Outcome: Ongoing

## 2020-01-18 NOTE — PLAN OF CARE
RN  Outcome: Ongoing     Problem: Discharge Planning:  Goal: Discharged to appropriate level of care  Description  Discharged to appropriate level of care  1/18/2020 1042 by Deana Khan RN  Outcome: Ongoing  1/18/2020 0311 by Nelia Quevedo RN  Outcome: Ongoing  Goal: Participates in care planning  Description  Participates in care planning  1/18/2020 1042 by Deana Khan RN  Outcome: Ongoing  1/18/2020 0311 by Nelia Quevedo RN  Outcome: Ongoing     Problem: Airway Clearance - Ineffective:  Goal: Clear lung sounds  Description  Clear lung sounds  1/18/2020 1042 by Deana Khan RN  Outcome: Ongoing  1/18/2020 0311 by Nelia Quevedo RN  Outcome: Ongoing  Goal: Ability to maintain a clear airway will improve  Description  Ability to maintain a clear airway will improve  1/18/2020 1042 by Deana Khan RN  Outcome: Ongoing  1/18/2020 0311 by Nelia Quevedo RN  Outcome: Ongoing     Problem: Fluid Volume - Deficit:  Goal: Achieves intake and output within specified parameters  Description  Achieves intake and output within specified parameters  1/18/2020 1042 by Deana Khan RN  Outcome: Ongoing  1/18/2020 0311 by Nelia Quevedo RN  Outcome: Ongoing     Problem: Gas Exchange - Impaired:  Goal: Levels of oxygenation will improve  Description  Levels of oxygenation will improve  1/18/2020 1042 by Deana Khan RN  Outcome: Ongoing  1/18/2020 0311 by Nelia Quevedo RN  Outcome: Ongoing     Problem: Hyperthermia:  Goal: Ability to maintain a body temperature in the normal range will improve  Description  Ability to maintain a body temperature in the normal range will improve  1/18/2020 1042 by Deana Khan RN  Outcome: Ongoing  1/18/2020 0311 by Nelia Quevedo RN  Outcome: Ongoing     Problem: Tobacco Use:  Goal: Will participate in inpatient tobacco-use cessation counseling  Description  Will participate in inpatient tobacco-use cessation counseling  1/18/2020 1042 by Deana Khan therapy  Description  Nutrition Problem: Severe malnutrition, In context of chronic illness  Intervention: Food and/or Nutrient Delivery: Continue current diet, Continue current ONS  Nutritional Goals: PO 50% or more meals and ONS, wt stable or gain.         1/18/2020 1042 by Andreina Quevedo RN  Outcome: Ongoing  1/18/2020 0311 by Ni Villanueva RN  Outcome: Ongoing

## 2020-01-18 NOTE — PLAN OF CARE
Problem: Falls - Risk of:  Goal: Will remain free from falls  Description  Will remain free from falls  Outcome: Ongoing  Goal: Absence of physical injury  Description  Absence of physical injury  Outcome: Ongoing     Problem: Infection:  Goal: Will remain free from infection  Description  Will remain free from infection  Outcome: Ongoing     Problem: Safety:  Goal: Free from accidental physical injury  Description  Free from accidental physical injury  Outcome: Ongoing  Goal: Free from intentional harm  Description  Free from intentional harm  Outcome: Ongoing     Problem: Daily Care:  Goal: Daily care needs are met  Description  Daily care needs are met  Outcome: Ongoing     Problem: Pain:  Goal: Patient's pain/discomfort is manageable  Description  Patient's pain/discomfort is manageable  Outcome: Ongoing     Problem: Skin Integrity:  Goal: Skin integrity will stabilize  Description  Skin integrity will stabilize  Outcome: Ongoing     Problem: Discharge Planning:  Goal: Patients continuum of care needs are met  Description  Patients continuum of care needs are met  Outcome: Ongoing     Problem: Discharge Planning:  Goal: Discharged to appropriate level of care  Description  Discharged to appropriate level of care  Outcome: Ongoing  Goal: Participates in care planning  Description  Participates in care planning  Outcome: Ongoing     Problem: Airway Clearance - Ineffective:  Goal: Clear lung sounds  Description  Clear lung sounds  Outcome: Ongoing  Goal: Ability to maintain a clear airway will improve  Description  Ability to maintain a clear airway will improve  Outcome: Ongoing     Problem: Fluid Volume - Deficit:  Goal: Achieves intake and output within specified parameters  Description  Achieves intake and output within specified parameters  Outcome: Ongoing     Problem: Gas Exchange - Impaired:  Goal: Levels of oxygenation will improve  Description  Levels of oxygenation will improve  Outcome: Ongoing Problem: Hyperthermia:  Goal: Ability to maintain a body temperature in the normal range will improve  Description  Ability to maintain a body temperature in the normal range will improve  Outcome: Ongoing     Problem: Tobacco Use:  Goal: Will participate in inpatient tobacco-use cessation counseling  Description  Will participate in inpatient tobacco-use cessation counseling  Outcome: Ongoing     Problem: Activity:  Goal: Capacity to carry out activities will improve  Description  Capacity to carry out activities will improve  Outcome: Ongoing  Goal: Fatigue will decrease  Description  Fatigue will decrease  Outcome: Ongoing  Goal: Energy level will increase  Description  Energy level will increase  Outcome: Ongoing  Goal: Ability to implement measures to reduce episodes of fatigue will improve  Description  Ability to implement measures to reduce episodes of fatigue will improve  Outcome: Ongoing     Problem: Coping:  Goal: Ability to identify and develop effective coping behavior will improve  Description  Ability to identify and develop effective coping behavior will improve  Outcome: Ongoing  Goal: Ability to identify and utilize available resources and services will improve  Description  Ability to identify and utilize available resources and services will improve  Outcome: Ongoing     Problem: Risk for Impaired Skin Integrity  Goal: Tissue integrity - skin and mucous membranes  Description  Structural intactness and normal physiological function of skin and  mucous membranes. Outcome: Ongoing     Problem: Nutrition  Goal: Optimal nutrition therapy  Description  Nutrition Problem: Severe malnutrition, In context of chronic illness  Intervention: Food and/or Nutrient Delivery: Continue current diet, Continue current ONS  Nutritional Goals: PO 50% or more meals and ONS, wt stable or gain.         Outcome: Ongoing

## 2020-01-18 NOTE — PROGRESS NOTES
Pt c/o increased shortness of air after having incontinent episode. o2 sat 84% on 2.5 l/nc. O2 increased to 4l/nc and used albuterol rescue inhaler. RT here and gave scheduled breathing tx. Pt coughing up large amounts of thick light brown sputum. 02 sats now fluctuating between 88% and 92%.

## 2020-01-18 NOTE — PROGRESS NOTES
Family Medicine Progress Note    Patient:  Elisabeth Mac  YOB: 1960    MRN: [id number]     Acct: [de-identified]     Admit date: 1/15/2020    Patient Seen, Chart, Consults notes, Labs, Radiology studies reviewed. Subjective: Day 3 of stay with COPD exacerbation with chronic Mycobacterium infection and now superimposed pneumonia and most recent (in last 24 hours) has had no subjective improvement, in fact worsening per patient. He is feeling more and more smothered despite stable vital signs and pulse oximetry. He is continuing to cough up copious amounts of purulent appearing sputum. Past, Family, Social History unchanged from admission. Diet:  DIET GENERAL;  Dietary Nutrition Supplements: Standard High Calorie Oral Supplement    Medications:  Scheduled Meds:   furosemide  20 mg Oral Daily    megestrol  625 mg Oral Daily    meropenem  1 g Intravenous Q8H    famotidine  20 mg Oral BID    azithromycin  500 mg Oral Daily    moxifloxacin  400 mg Oral Daily    sodium chloride flush  10 mL Intravenous 2 times per day    enoxaparin  40 mg Subcutaneous Daily    ethambutol  800 mg Oral Daily    guaiFENesin  1,200 mg Oral BID    lactobacillus  1 capsule Oral Daily    rifampin  600 mg Oral Daily    ipratropium-albuterol  1 ampule Inhalation Q4H WA     Continuous Infusions:  PRN Meds:hydrOXYzine, HYDROcodone 5 mg - acetaminophen, sodium chloride flush, acetaminophen, albuterol sulfate HFA    Objective:    Vitals: BP 91/60   Pulse 108   Temp 99.1 °F (37.3 °C) (Temporal)   Resp 16   Ht 5' 9\" (1.753 m)   Wt 95 lb (43.1 kg)   SpO2 90%   BMI 14.03 kg/m²   24 hour intake/output:    Intake/Output Summary (Last 24 hours) at 1/18/2020 1228  Last data filed at 1/18/2020 0914  Gross per 24 hour   Intake 420 ml   Output 1325 ml   Net -905 ml     Last 3 weights:   Wt Readings from Last 3 Encounters:   01/15/20 95 lb (43.1 kg)   12/08/19 100 lb 4 oz (45.5 kg)   08/14/19 107 lb (48.5 kg) Physical Exam:    General Appearance:  alert, cooperative and anxious affect  Skin:  Skin color, texture, turgor normal. No rashes or lesions. Eyes:  No gross abnormalities. Neck:  neck- supple, no mass, non-tender  Lungs:  Breathing Pattern: rapid, shallow and use of accessory muscles, Breath sounds: diminished breath sounds- throughout and diffuse and rales- middle right and middle left  Heart:  Heart regular rate and rhythm  Abdomen: Auscultation: Normal bowel sounds. No bruits. Palpation: No masses, tenderness or organomegally.   Extremities: pulses present in all extremities and 1+ edema of  bilateral lower extremities   Musculoskeletal:  negative  Neurologic: Grossly nonfocal    CBC with Differential:    Lab Results   Component Value Date    WBC 11.2 01/18/2020    RBC 3.45 01/18/2020    HGB 9.3 01/18/2020    HCT 29.6 01/18/2020    HCT 46.5 02/09/2012     01/18/2020    PLT 90 02/09/2012    MCV 85.8 01/18/2020    MCH 27.0 01/18/2020    MCHC 31.4 01/18/2020    RDW 13.9 01/18/2020    LYMPHOPCT 2.5 01/15/2020    MONOPCT 6.7 01/15/2020    EOSPCT 0.8 02/09/2012    BASOPCT 0.3 01/15/2020    MONOSABS 1.00 01/15/2020    LYMPHSABS 0.4 01/15/2020    EOSABS 0.10 01/15/2020    BASOSABS 0.10 01/15/2020     CMP:    Lab Results   Component Value Date     01/18/2020     02/09/2012    K 3.7 01/18/2020    K 4.0 01/15/2020    K 4.0 02/09/2012    CL 96 01/18/2020     02/09/2012    CO2 24 01/18/2020    BUN 9 01/18/2020    CREATININE <0.5 01/18/2020    CREATININE 0.6 02/09/2012    LABGLOM >60 01/18/2020    GLUCOSE 88 01/18/2020    PROT 6.4 01/14/2020    PROT 5.7 09/03/2012    LABALBU 2.6 01/14/2020    LABALBU 4.4 02/09/2012    CALCIUM 8.0 01/18/2020    BILITOT <0.2 01/14/2020    ALKPHOS 96 01/14/2020    ALKPHOS 85 02/09/2012    AST 25 01/14/2020    ALT 27 01/14/2020     Last 3 Troponin:    Lab Results   Component Value Date    TROPONINI 0.02 01/15/2020    TROPONINI 0.01 12/08/2019    TROPONINI 0.03

## 2020-01-18 NOTE — PROGRESS NOTES
injection 10 mL, PRN  acetaminophen (TYLENOL) tablet 650 mg, Q4H PRN  enoxaparin (LOVENOX) injection 40 mg, Daily  albuterol sulfate  (90 Base) MCG/ACT inhaler 2 puff, Q6H PRN  ethambutol (MYAMBUTOL) tablet 800 mg, Daily  guaiFENesin (MUCINEX) extended release tablet 1,200 mg, BID  lactobacillus (CULTURELLE) capsule 1 capsule, Daily  rifampin (RIFADIN) capsule 600 mg, Daily  ipratropium-albuterol (DUONEB) nebulizer solution 1 ampule, Q4H WA      Review of Systems  No nausea or diarrhea. Appetite is slightly poor. VitalSigns:  /67   Pulse 104   Temp 98.2 °F (36.8 °C)   Resp 22   Ht 5' 9\" (1.753 m)   Wt 95 lb (43.1 kg)   SpO2 90%   BMI 14.03 kg/m²      Physical Exam  Line/IV site: No erythema, warmth, induration, or tenderness. Slightly prolonged expiratory phase. Few scattered crackles bilaterally. General thin and chronically ill-appearing  Abdomen soft and nontender  Extremities without edema    Lab Results:  CBC:   Recent Labs     01/15/20  1505 01/17/20  0332 01/18/20  0417   WBC 14.5* 11.6* 11.2*   HGB 11.8* 10.2* 9.3*   * 453* 396     BMP:  Recent Labs     01/15/20  1505 01/15/20  1535 01/17/20  0332 01/18/20  0417   *  --  133* 132*   K 4.0 3.9 5.1* 3.7   CL 89*  --  95* 96*   CO2 25  --  25 24   BUN 8  --  7* 9   CREATININE <0.5  --  0.5 <0.5   GLUCOSE 116*  --  104 88     CultureResults:  Respiratory culture 1/17/2020:  CULTURE, RESPIRATORY 01/17/2020  3:21 AM [facility]   Rare growth normal respiratory lina with   Light growth of a Mold species, additional incubation required    Gram Stain Result 01/17/2020  3:21 AM [facility]   No Epithelial Cells seen   Many WBC's (Polymorphonuclear)   No organisms seen      Radiology:   CXR PA and lateral 1/15/2020: Impression   1. New infiltrate in the right middle and right lower lobes. Most   likely this is pneumonia superimposed on severe COPD   2. Small posterior pleural effusion is present. Pulmonary vascular   congestion may also be present. Signed by Dr Rebecca Oreilly on 1/15/2020 2:53 PM     Additional Studies Reviewed:  None    Impression/recommendations:  1. Advanced chronic obstructive pulmonary disease  2. Right middle and lower lobe pneumonia-on meropenem  3. 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 Considering addition of amikacin. 4.  One sputum culture showing mold. Going to reevaluate with sputum fungal culture.     Recommendations:  As above  Await additional studies  Continue current treatment  Agreed nursing home likely best option of discharge  Considering addition of amikacin  Reassessing sputum cultures  Continue supportive care    Giovana Novak MD

## 2020-01-19 NOTE — PLAN OF CARE
Problem: Hyperthermia:  Goal: Ability to maintain a body temperature in the normal range will improve  Description  Ability to maintain a body temperature in the normal range will improve  Outcome: Ongoing     Problem: Tobacco Use:  Goal: Will participate in inpatient tobacco-use cessation counseling  Description  Will participate in inpatient tobacco-use cessation counseling  Outcome: Ongoing     Problem: Activity:  Goal: Capacity to carry out activities will improve  Description  Capacity to carry out activities will improve  Outcome: Ongoing  Goal: Fatigue will decrease  Description  Fatigue will decrease  Outcome: Ongoing  Goal: Energy level will increase  Description  Energy level will increase  Outcome: Ongoing  Goal: Ability to implement measures to reduce episodes of fatigue will improve  Description  Ability to implement measures to reduce episodes of fatigue will improve  Outcome: Ongoing     Problem: Coping:  Goal: Ability to identify and develop effective coping behavior will improve  Description  Ability to identify and develop effective coping behavior will improve  Outcome: Ongoing  Goal: Ability to identify and utilize available resources and services will improve  Description  Ability to identify and utilize available resources and services will improve  Outcome: Ongoing     Problem: Risk for Impaired Skin Integrity  Goal: Tissue integrity - skin and mucous membranes  Description  Structural intactness and normal physiological function of skin and  mucous membranes. Outcome: Ongoing     Problem: Nutrition  Goal: Optimal nutrition therapy  Description  Nutrition Problem: Severe malnutrition, In context of chronic illness  Intervention: Food and/or Nutrient Delivery: Continue current diet, Continue current ONS  Nutritional Goals: PO 50% or more meals and ONS, wt stable or gain.         Outcome: Ongoing

## 2020-01-19 NOTE — PROGRESS NOTES
Family Medicine Progress Note    Patient:  Clara Manuel  YOB: 1960    MRN: [id number]     Acct: [de-identified]     Admit date: 1/15/2020    Patient Seen, Chart, Consults notes, Labs, Radiology studies reviewed. Subjective: Day 4 of stay with advanced COPD with associated chronic Mycobacterium infection and most recent (in last 24 hours) has had continued shortness of breath with minimal exertion. Continues to cough up copious amounts of purulent appearing sputum. Overall feels no improvement. He in fact request a [place] catheter to be placed so that he does not get significantly dyspneic when using the bedside urinal.  His appetite has been marginal at best.  Continues to complain of generalized pain and anxiety. Past, Family, Social History unchanged from admission. Diet:  DIET GENERAL;  Dietary Nutrition Supplements: Standard High Calorie Oral Supplement    Medications:  Scheduled Meds:   furosemide  20 mg Oral Daily    megestrol  625 mg Oral Daily    meropenem  1 g Intravenous Q8H    famotidine  20 mg Oral BID    azithromycin  500 mg Oral Daily    moxifloxacin  400 mg Oral Daily    sodium chloride flush  10 mL Intravenous 2 times per day    enoxaparin  40 mg Subcutaneous Daily    ethambutol  800 mg Oral Daily    guaiFENesin  1,200 mg Oral BID    lactobacillus  1 capsule Oral Daily    rifampin  600 mg Oral Daily    ipratropium-albuterol  1 ampule Inhalation Q4H WA     Continuous Infusions:  PRN Meds:hydrOXYzine, HYDROcodone 5 mg - acetaminophen, sodium chloride flush, acetaminophen, albuterol sulfate HFA    Objective:    Vitals: /68   Pulse 84   Temp 98.5 °F (36.9 °C)   Resp 12   Ht 5' 9\" (1.753 m)   Wt 95 lb (43.1 kg)   SpO2 96%   BMI 14.03 kg/m²   24 hour intake/output:    Intake/Output Summary (Last 24 hours) at 1/19/2020 0921  Last data filed at 1/19/2020 0525  Gross per 24 hour   Intake 1020 ml   Output 650 ml   Net 370 ml     Last 3 weights:   Wt Readings 12/08/2019     Urine Culture:  No components found for: CURINE  Blood Culture:  No components found for: CBLOOD, CFUNGUSBL  Stool Culture:  No components found for: CSTOOL    Assessment:    Principal Problem:    Pneumonia  Active Problems:    Panlobular emphysema (HCC)    Hyperlipidemia    Hypertension    Nicotine dependence, cigarettes, uncomplicated    Acute exacerbation of chronic obstructive airways disease (HCC)    Severe malnutrition (HCC)    Chronic respiratory failure with hypoxia (HCC)    Chronic alcoholism in remission ([facility] [address]    Dependence on supplemental oxygen    Former heavy tobacco smoker    Major depression in remission ([facility] [address]    Mycobacterium infection  Resolved Problems:    * No resolved hospital problems. *          Plan:  Continue with aggressive pulmonary measures and antimicrobials. Sputum culture back showing mold. Aspergillosis? Given multitude of side effects and other problems with those medications and the fact that infectious disease is following I will defer treatment to their recommendation. Continue extra coverage for Pseudomonas based on history alone of prior pseudomonal pulmonary infection. Also continuing treatment for the non-tuberculosis mycobacterium. All these together plus the fact of the stage of his COPD being so advanced are all poor prognostic indicators unfortunately. Medications are written as needed for both pain and anxiety. May need to consider whether or not hospice or at the very least palliative care might not be most appropriate. I have not thoroughly discussed that with the patient yet. Greater than 25 minutes of time spent in patient care for Mr. Briones.     Electronically signed by Faith Edgar MD on 1/19/2020 at 9:21 AM

## 2020-01-20 NOTE — PROGRESS NOTES
Pulmonary and Critical Care Progress Note [address] [facility]    Patient: David Barnett  1960   MR# [id number]   Acct# [de-identified]  01/20/20   9:02 AM  Referring Provider: Orestes Lanier MD    Chief Complaint: Shortness of breath, pneumonia  Interval history: The patient is resting in bed on 4 L NC. He has no new pulmonary complaints. He is coughing up light green sputum. No family at bedside. No other aggravating or alleviating factors or associated symptoms. Medications: furosemide, 20 mg, Oral, Daily    megestrol, 625 mg, Oral, Daily    meropenem, 1 g, Intravenous, Q8H    famotidine, 20 mg, Oral, BID    azithromycin, 500 mg, Oral, Daily    moxifloxacin, 400 mg, Oral, Daily    sodium chloride flush, 10 mL, Intravenous, 2 times per day    enoxaparin, 40 mg, Subcutaneous, Daily    ethambutol, 800 mg, Oral, Daily    guaiFENesin, 1,200 mg, Oral, BID    lactobacillus, 1 capsule, Oral, Daily    rifampin, 600 mg, Oral, Daily    ipratropium-albuterol, 1 ampule, Inhalation, Q4H WA   Review of Systems: Review of Systems   Constitutional: Positive for fatigue. Negative for chills and fever. Respiratory: Positive for cough and shortness of breath. Cardiovascular: Negative for chest pain. Gastrointestinal: Negative for diarrhea, nausea and vomiting. Neurological: Positive for weakness. Physical Exam:  Blood pressure 96/62, pulse 112, temperature 98.4 °F (36.9 °C), temperature source Temporal, resp. rate 19, height 5' 9\" (1.753 m), weight 95 lb (43.1 kg), SpO2 94 %. Intake/Output Summary (Last 24 hours) at 1/20/2020 0902  Last data filed at 1/20/2020 0814  Gross per 24 hour   Intake 700 ml   Output 775 ml   Net -75 ml     Physical Exam  Vitals signs and nursing note reviewed. Constitutional:       General: He is not in acute distress. Appearance: He is cachectic. He is ill-appearing. Interventions: Nasal cannula in place. HENT:      Head: Normocephalic and atraumatic. Eyes:      Pupils: Pupils are equal, round, and reactive to light. Neck:      Musculoskeletal: Normal range of motion and neck supple. Cardiovascular:      Rate and Rhythm: Normal rate and regular rhythm. Heart sounds: Normal heart sounds. Pulmonary:      Effort: Prolonged expiration present. Breath sounds: Decreased breath sounds and rhonchi (few) present. No wheezing or rales. Chest:      Chest wall: No tenderness. Abdominal:      General: Bowel sounds are normal.      Palpations: Abdomen is soft. Musculoskeletal: Normal range of motion. General: Swelling present. Skin:     General: Skin is warm and dry. Neurological:      Mental Status: He is alert and oriented to person, place, and time. Psychiatric:         Thought Content: Thought content normal.       Recent Labs     01/18/20 0417 01/19/20 0420   WBC 11.2* 12.2*   RBC 3.45* 3.57*   HGB 9.3* 9.4*   HCT 29.6* 30.7*    369   MCV 85.8 86.0   MCH 27.0 26.3*   MCHC 31.4* 30.6*   RDW 13.9 14.2      Recent Labs     01/18/20 0417 01/19/20  0420   * 132*   K 3.7 4.2   CL 96* 96*   CO2 24 25   BUN 9 7*   CREATININE <0.5 <0.5   CALCIUM 8.0* 8.1*   GLUCOSE 88 91      No results for input(s): BC, LABGRAM, CULTRESP, BFCX in the last 72 hours. Radiograph:   My radiograph interpretation: none to review today  Pulmonary Assessment:  1. Middle lobe and right lower lobe infiltrate  2. Atypical Mycobacterium infection (M xenopi)  3. Acute on chronic hypoxemic respiratory failure, on 3 L  4. Very severe COPD  5. Cachexia  6. GERD  7. Severe malnutrition    Recommend:   · Continue supplemental oxygen and titrate as tolerated, baseline 3L  · Antibiotics per ID  · Continue bronchodilators  · IS/flutter, mobilize     Electronically signed by Jose Goldman on 1/20/2020 at 9:02 AM    Physician Substantive portion:  Pt reports coughing a lot of sputum. and reports chronic dyspnea. Exam unchanged, scattered rhonchi, ill appearing.

## 2020-01-20 NOTE — PROGRESS NOTES
Family Medicine Progress Note    Patient:  Colonel Hightower  YOB: 1960    MRN: [id number]     Acct: [de-identified]     Admit date: 1/15/2020    Patient Seen, Chart, Consults notes, Labs, Radiology studies reviewed. Subjective: Day 5 of stay with exacerbation of chronic obstructive pulmonary disease and persistent Mycobacterium infection non-tuberculin and most recent (in last 24 hours) has had marginal at best improvement. Was able to sleep through the night with less dyspnea. Still coughing up a lot of purulent sputum. Denies any fever. No new aches or pains. Past, Family, Social History unchanged from admission. Diet:  DIET GENERAL;  Dietary Nutrition Supplements: Standard High Calorie Oral Supplement    Medications:  Scheduled Meds:   furosemide  20 mg Oral Daily    megestrol  625 mg Oral Daily    meropenem  1 g Intravenous Q8H    famotidine  20 mg Oral BID    azithromycin  500 mg Oral Daily    moxifloxacin  400 mg Oral Daily    sodium chloride flush  10 mL Intravenous 2 times per day    enoxaparin  40 mg Subcutaneous Daily    ethambutol  800 mg Oral Daily    guaiFENesin  1,200 mg Oral BID    lactobacillus  1 capsule Oral Daily    rifampin  600 mg Oral Daily    ipratropium-albuterol  1 ampule Inhalation Q4H WA     Continuous Infusions:  PRN Meds:hydrOXYzine, HYDROcodone 5 mg - acetaminophen, sodium chloride flush, acetaminophen, albuterol sulfate HFA    Objective:    Vitals: BP 96/62   Pulse 112   Temp 98.4 °F (36.9 °C) (Temporal)   Resp 19   Ht 5' 9\" (1.753 m)   Wt 95 lb (43.1 kg)   SpO2 94%   BMI 14.03 kg/m²   24 hour intake/output:    Intake/Output Summary (Last 24 hours) at 1/20/2020 0808  Last data filed at 1/20/2020 0625  Gross per 24 hour   Intake 690 ml   Output 575 ml   Net 115 ml     Last 3 weights:   Wt Readings from Last 3 Encounters:   01/15/20 95 lb (43.1 kg)   12/08/19 100 lb 4 oz (45.5 kg)   08/14/19 107 lb (48.5 kg)       Physical Exam:    General Appearance:  alert, cooperative and depressed affect  Skin:  Skin color, texture, turgor normal. No rashes or lesions. Eyes:  No gross abnormalities. Neck:  neck- supple, no mass, non-tender  Lungs:  Breathing Pattern: regular, no distress, Breath sounds: diminished breath sounds- throughout and diffuse and rales- scattered  Heart:  Heart regular rate and rhythm  Abdomen: Auscultation: Normal bowel sounds. No bruits.   Extremities: trace pedal edema  Musculoskeletal:  negative  Neurologic: Grossly nonfocal    CBC with Differential:    Lab Results   Component Value Date    WBC 12.2 01/19/2020    RBC 3.57 01/19/2020    HGB 9.4 01/19/2020    HCT 30.7 01/19/2020    HCT 46.5 02/09/2012     01/19/2020    PLT 90 02/09/2012    MCV 86.0 01/19/2020    MCH 26.3 01/19/2020    MCHC 30.6 01/19/2020    RDW 14.2 01/19/2020    LYMPHOPCT 2.5 01/15/2020    MONOPCT 6.7 01/15/2020    EOSPCT 0.8 02/09/2012    BASOPCT 0.3 01/15/2020    MONOSABS 1.00 01/15/2020    LYMPHSABS 0.4 01/15/2020    EOSABS 0.10 01/15/2020    BASOSABS 0.10 01/15/2020     CMP:    Lab Results   Component Value Date     01/19/2020     02/09/2012    K 4.2 01/19/2020    K 4.0 01/15/2020    K 4.0 02/09/2012    CL 96 01/19/2020     02/09/2012    CO2 25 01/19/2020    BUN 7 01/19/2020    CREATININE <0.5 01/19/2020    CREATININE 0.6 02/09/2012    LABGLOM >60 01/19/2020    GLUCOSE 91 01/19/2020    PROT 6.4 01/14/2020    PROT 5.7 09/03/2012    LABALBU 2.6 01/14/2020    LABALBU 4.4 02/09/2012    CALCIUM 8.1 01/19/2020    BILITOT <0.2 01/14/2020    ALKPHOS 96 01/14/2020    ALKPHOS 85 02/09/2012    AST 25 01/14/2020    ALT 27 01/14/2020     Last 3 Troponin:    Lab Results   Component Value Date    TROPONINI 0.02 01/15/2020    TROPONINI 0.01 12/08/2019    TROPONINI 0.03 12/08/2019     Urine Culture:  No components found for: CURINE  Blood Culture:  No components found for: CBLOOD, CFUNGUSBL  Stool Culture:  No components found for:

## 2020-01-20 NOTE — PROGRESS NOTES
kg)(12/18)  · % Weight Change:  ,  5% loss past month, 11.2% in 5 months. · Ideal Body Wt: 160 lb (72.6 kg), % Ideal Body 59%  · BMI Classification: BMI <18.5 Underweight    Nutrition Interventions:   Continue current diet, Continue current ONS  Continued Inpatient Monitoring    Nutrition Evaluation:   · Evaluation: Progressing toward goals   · Goals: PO 50% or more meals and ONS, wt stable or gain.     · Monitoring: Meal Intake, Supplement Intake, Skin Integrity, Weight, Pertinent Labs      Electronically signed by Alfie Bautista MS, RD, LD on 1/20/20 at 1:30 PM    Contact Number: [id number]

## 2020-01-20 NOTE — PLAN OF CARE
Problem: Falls - Risk of:  Goal: Will remain free from falls  Description  Will remain free from falls  1/20/2020 0322 by Jose Stevens RN  Outcome: Ongoing  1/20/2020 0321 by Jose Stevens RN  Outcome: Ongoing  1/19/2020 1710 by Laurent Noel LPN  Outcome: Ongoing  Goal: Absence of physical injury  Description  Absence of physical injury  1/20/2020 0322 by Jose Stevens RN  Outcome: Ongoing  1/20/2020 0321 by Jose Stevens RN  Outcome: Ongoing  1/19/2020 1710 by Laurent Noel LPN  Outcome: Ongoing     Problem: Infection:  Goal: Will remain free from infection  Description  Will remain free from infection  1/20/2020 0322 by Jose Stevens RN  Outcome: Ongoing  1/20/2020 0321 by Jose Stevens RN  Outcome: Ongoing  1/19/2020 1710 by Laurent Noel LPN  Outcome: Ongoing     Problem: Safety:  Goal: Free from accidental physical injury  Description  Free from accidental physical injury  1/20/2020 0322 by Jose Stevens RN  Outcome: Ongoing  1/20/2020 0321 by Jose Stevens RN  Outcome: Ongoing  1/19/2020 1710 by Laurent Noel LPN  Outcome: Ongoing  Goal: Free from intentional harm  Description  Free from intentional harm  1/20/2020 0322 by Jose Stevens RN  Outcome: Ongoing  1/20/2020 0321 by Jose Stevens RN  Outcome: Ongoing  1/19/2020 1710 by Laurent Noel LPN  Outcome: Ongoing     Problem: Daily Care:  Goal: Daily care needs are met  Description  Daily care needs are met  1/20/2020 0322 by Jose Stevens RN  Outcome: Ongoing  1/20/2020 0321 by Jose Stevens RN  Outcome: Ongoing  1/19/2020 1710 by Laurent Noel LPN  Outcome: Ongoing     Problem: Pain:  Goal: Patient's pain/discomfort is manageable  Description  Patient's pain/discomfort is manageable  1/20/2020 0322 by Jose Stevens RN  Outcome: Ongoing  1/20/2020 0321 by Jose Stevens RN  Outcome: Ongoing  1/19/2020 1710 by Laurent Noel LPN  Outcome: Ongoing     Problem: Skin Integrity:  Goal: Skin integrity will stabilize  Description  Skin integrity will stabilize  1/20/2020 0322 by Jose Stevens RN  Outcome: Ongoing  1/20/2020 0321 by Jose Stevens RN  Outcome: Ongoing  1/19/2020 1710 by Laurent Noel LPN  Outcome: Ongoing     Problem: Discharge Planning:  Goal: Patients continuum of care needs are met  Description  Patients continuum of care needs are met  1/20/2020 0322 by Jose Stevens RN  Outcome: Ongoing  1/20/2020 0321 by Jose Stevens RN  Outcome: Ongoing  1/19/2020 1710 by Laurent Noel LPN  Outcome: Ongoing     Problem: Discharge Planning:  Goal: Discharged to appropriate level of care  Description  Discharged to appropriate level of care  1/20/2020 0322 by Jose Stevens RN  Outcome: Ongoing  1/20/2020 0321 by Jose Stevens RN  Outcome: Ongoing  1/19/2020 1710 by Laurent Noel LPN  Outcome: Ongoing  Goal: Participates in care planning  Description  Participates in care planning  1/20/2020 0322 by Jose Stevens RN  Outcome: Ongoing  1/20/2020 0321 by Jose Stevens RN  Outcome: Ongoing  1/19/2020 1710 by Laurent Noel LPN  Outcome: Ongoing     Problem: Airway Clearance - Ineffective:  Goal: Clear lung sounds  Description  Clear lung sounds  1/20/2020 0322 by Jose Stevens RN  Outcome: Ongoing  1/20/2020 0321 by Jose Stevens RN  Outcome: Ongoing  1/19/2020 1710 by Laurent Noel LPN  Outcome: Ongoing  Goal: Ability to maintain a clear airway will improve  Description  Ability to maintain a clear airway will improve  1/20/2020 0322 by Jose Stevens RN  Outcome: Ongoing  1/20/2020 0321 by Jose Stevens RN  Outcome: Ongoing  1/19/2020 1710 by Laurent Noel LPN  Outcome: Ongoing     Problem: Fluid Volume - Deficit:  Goal: Achieves intake and output within specified parameters  Description  Achieves intake and output within specified parameters  1/20/2020 0322 by Jose Stevens RN  Outcome: Ongoing  1/20/2020 0321 by Jose Stevens fatigue will improve  Description  Ability to implement measures to reduce episodes of fatigue will improve  1/20/2020 0322 by Theresa Buerger, RN  Outcome: Ongoing  1/20/2020 0321 by Theresa Buerger, RN  Outcome: Ongoing  1/19/2020 1710 by Randall Munson LPN  Outcome: Ongoing     Problem: Coping:  Goal: Ability to identify and develop effective coping behavior will improve  Description  Ability to identify and develop effective coping behavior will improve  1/20/2020 0322 by Theresa Buerger, RN  Outcome: Ongoing  1/20/2020 0321 by Theresa Buerger, RN  Outcome: Ongoing  1/19/2020 1710 by Randall Munson LPN  Outcome: Ongoing  Goal: Ability to identify and utilize available resources and services will improve  Description  Ability to identify and utilize available resources and services will improve  1/20/2020 0322 by Theresa Buerger, RN  Outcome: Ongoing  1/20/2020 0321 by Theresa Buerger, RN  Outcome: Ongoing  1/19/2020 1710 by Randall Munson LPN  Outcome: Ongoing     Problem: Risk for Impaired Skin Integrity  Goal: Tissue integrity - skin and mucous membranes  Description  Structural intactness and normal physiological function of skin and  mucous membranes. 1/20/2020 0322 by Theresa Buerger, RN  Outcome: Ongoing  1/20/2020 0321 by Theresa Buerger, RN  Outcome: Ongoing  1/19/2020 1710 by Randall Munson LPN  Outcome: Ongoing     Problem: Nutrition  Goal: Optimal nutrition therapy  Description  Nutrition Problem: Severe malnutrition, In context of chronic illness  Intervention: Food and/or Nutrient Delivery: Continue current diet, Continue current ONS  Nutritional Goals: PO 50% or more meals and ONS, wt stable or gain.         1/20/2020 0322 by Theresa Buerger, RN  Outcome: Ongoing  1/20/2020 0321 by Theresa Buerger, RN  Outcome: Ongoing  1/19/2020 1710 by Randall Munson LPN  Outcome: Ongoing

## 2020-01-20 NOTE — PROGRESS NOTES
Pt educated on his increased risk for falls due to his COPD diagnosis, generalized weakness, and his IV. Pt stated that he understood that he was a fall risk but that he did not want his bed alarm on. Pt stated that he uses the urinal and bedside commode. Pt stated that he would use the call light if he needed help. Non-skid socks, fall risk bracelet and sign in place. Will continue to assess.     Electronically signed by Jailyn Reese RN on 1/20/2020 at 9:16 AM

## 2020-01-20 NOTE — PLAN OF CARE
Problem: Falls - Risk of:  Goal: Will remain free from falls  Description  Will remain free from falls  1/20/2020 0814 by Trino Ball RN  Outcome: Ongoing  1/20/2020 0322 by Gary Roe RN  Outcome: Ongoing  1/20/2020 0321 by Gary Roe RN  Outcome: Ongoing  Goal: Absence of physical injury  Description  Absence of physical injury  1/20/2020 0814 by Trino Ball RN  Outcome: Ongoing  1/20/2020 0322 by Gary Roe RN  Outcome: Ongoing  1/20/2020 0321 by Gary Roe RN  Outcome: Ongoing     Problem: Infection:  Goal: Will remain free from infection  Description  Will remain free from infection  1/20/2020 0814 by Trino Ball RN  Outcome: Ongoing  1/20/2020 0322 by Gary Roe RN  Outcome: Ongoing  1/20/2020 0321 by Gary Roe RN  Outcome: Ongoing     Problem: Safety:  Goal: Free from accidental physical injury  Description  Free from accidental physical injury  1/20/2020 0814 by Trino Ball RN  Outcome: Ongoing  1/20/2020 0322 by Gary Roe RN  Outcome: Ongoing  1/20/2020 0321 by Gary Roe RN  Outcome: Ongoing  Goal: Free from intentional harm  Description  Free from intentional harm  1/20/2020 0814 by Trino Ball RN  Outcome: Ongoing  1/20/2020 0322 by Gary Roe RN  Outcome: Ongoing  1/20/2020 0321 by Gary Roe RN  Outcome: Ongoing     Problem: Daily Care:  Goal: Daily care needs are met  Description  Daily care needs are met  1/20/2020 0814 by Trino Ball RN  Outcome: Ongoing  1/20/2020 0322 by Gary Roe RN  Outcome: Ongoing  1/20/2020 0321 by Gary Roe RN  Outcome: Ongoing     Problem: Pain:  Goal: Patient's pain/discomfort is manageable  Description  Patient's pain/discomfort is manageable  1/20/2020 0814 by Trino Ball RN  Outcome: Ongoing  1/20/2020 0322 by Gary Roe RN  Outcome: Ongoing  1/20/2020 0321 by Gary Roe RN  Outcome: Ongoing     Problem: Skin Integrity:  Goal: Skin integrity will stabilize  Description  Skin integrity will stabilize  1/20/2020 0814 by Brook Gamboa RN  Outcome: Ongoing  1/20/2020 0322 by Darren Mancuso RN  Outcome: Ongoing  1/20/2020 0321 by Darren Mancuso RN  Outcome: Ongoing     Problem: Discharge Planning:  Goal: Patients continuum of care needs are met  Description  Patients continuum of care needs are met  1/20/2020 0814 by Borok Gamboa RN  Outcome: Ongoing  1/20/2020 0322 by Darren Mancuso RN  Outcome: Ongoing  1/20/2020 0321 by Darren Mancuso RN  Outcome: Ongoing     Problem: Discharge Planning:  Goal: Discharged to appropriate level of care  Description  Discharged to appropriate level of care  1/20/2020 0814 by Brook Gamboa RN  Outcome: Ongoing  1/20/2020 0322 by Darren Mancuso RN  Outcome: Ongoing  1/20/2020 0321 by Darren Mancuso RN  Outcome: Ongoing  Goal: Participates in care planning  Description  Participates in care planning  1/20/2020 0814 by Brook aGmboa RN  Outcome: Ongoing  1/20/2020 0322 by Darren Mancuso RN  Outcome: Ongoing  1/20/2020 0321 by Darren Mancuso RN  Outcome: Ongoing     Problem: Airway Clearance - Ineffective:  Goal: Clear lung sounds  Description  Clear lung sounds  1/20/2020 0814 by Brook Gamboa RN  Outcome: Ongoing  1/20/2020 0322 by Darren Mancuso RN  Outcome: Ongoing  1/20/2020 0321 by Darren Mancuso RN  Outcome: Ongoing  Goal: Ability to maintain a clear airway will improve  Description  Ability to maintain a clear airway will improve  1/20/2020 0814 by Brook Gamboa RN  Outcome: Ongoing  1/20/2020 0322 by Darren Mancuso RN  Outcome: Ongoing  1/20/2020 0321 by Darren Mancuso RN  Outcome: Ongoing     Problem: Fluid Volume - Deficit:  Goal: Achieves intake and output within specified parameters  Description  Achieves intake and output within specified parameters  1/20/2020 0814 by Brook Gamboa RN  Outcome: Ongoing  1/20/2020 0322 by Darren Mancuso RN  Outcome:

## 2020-01-20 NOTE — PROGRESS NOTES
Pharmacy Consult      Linda Head is a 62 y.o. male for whom pharmacy has been consulted to evaluate drug-drug interactions between voriconazole or posaconazole with the patient's current medications, included in the evaluation is rifabutin used in lieu of rifampin. Level 1 interactions (contraindicated):    Rifabutin and Voriconazole  Rifabutin may have its effect increased by, and may decrease effect of Voriconazole  Concurrent administration of rifabutin and voriconazole is contraindicated. In one study, the maximum plasma concentration (Cmax) and systemic exposure (AUC) of voriconazole were decreased by an average of 67% and 79%, respectively, when given with rifabutin. Another study, found concurrent administration caused the Cmax and AUC of rifabutin to be increased by an average of 3-times and 4-times, respectively. Rifabutin induces the CYP2C9, ONA3Y03 and CY metabolism of voriconazole. Voriconazole inhibits the CY metabolism of rifabutin. Rifampin and Voriconazole  Rifampin may decrease concentration of Voriconazole  Use of rifampin with voriconazole is contraindicated. Induction of CY, CYP2C9 and [id number] by rifampin could result in increased voriconazole clearance and impaired antifungal activity. Voriconazole AUC and Cmax may be reduced by roughly 95%. Doubling the dose of voriconazole to 400 mg every 12 hours does not restore adequate exposure to voriconazole during rifampin coadministration. Level 2 interactions (serious but not contraindicated):    Posaconazole and Rifampin  Posaconazole may have its concentration decreased by Rifampin  Do not administer rifampin with posaconazole when treating an invasive fungal infection. If treating non-invasive fungal infection, these drugs may be administered together with monitoring of posaconazole concentration and appropriate dose modifications.  Taking these drugs together is expected to significantly reduce posaconazole plasma concentrations. Rifampin is an inducer and substrate of the drug efflux protein, P-glycoprotein, for which posaconazole is an inhibitor and also a substrate. Posaconazole and Rifabutin  Posaconazole may have its effect decreased by, and may increase effect of Rifabutin  The concurrent use of posaconazole and rifabutin should be avoided, if possible, due to the potential for decreased posaconazole efficacy as well as increased risk of rifabutin related adverse events. If used in combination, closely monitor for breakthrough fungal infections and rifabutin adverse events, such as uveitis and leukopenia. Rifabutin induces UDP-glucuronidase resulting in decreased posaconazole plasma concentrations. When posaconazole (200 mg PO daily) was administered with rifabutin (300 mg PO daily), the mean reductions in Cmax were 43% and the mean reductions in AUC were 49% for posaconazole. Additionally, posaconazole is a potent inhibitor of CY, an isoenzyme responsible for the metabolism of rifabutin. Coadministration of posaconazole (200 mg PO daily) with rifabutin (300 mg PO daily) increased the mean rifabutin Cmax (by 31%) and AUC (by 72%). The concomitant use of rifabutin with posaconazole should be avoided unless the benefits outweigh the risks; dosage adjustment recommendations are not available    Assessment/Plan:    From what I can tell, as far as contraindicated drug interactions go, You could either substitute posaconazole for voriconazole or use rifabutin for rifampin, but rifabutin can only be used with posaconazole . Both of these medications are  nonformulary, pharmacy will discuss with Dr Rashel Akins. What probably makes the most sense from a formulary standpoint is to keep rifampin on board and pharmacy attempt to obtain posaconazole. Thank you for the consult.      Electronically signed by Jez Abdalla, [facility] on 2020 at 3:12 PM

## 2020-01-20 NOTE — PLAN OF CARE
ITZEL Solo  Outcome: Ongoing     Problem: Discharge Planning:  Goal: Discharged to appropriate level of care  Description  Discharged to appropriate level of care  1/20/2020 0321 by Mami Evangelista RN  Outcome: Ongoing  1/19/2020 1710 by Spencer Caal LPN  Outcome: Ongoing  Goal: Participates in care planning  Description  Participates in care planning  1/20/2020 0321 by Mami Evangelista RN  Outcome: Ongoing  1/19/2020 1710 by Spencer Caal LPN  Outcome: Ongoing     Problem: Airway Clearance - Ineffective:  Goal: Clear lung sounds  Description  Clear lung sounds  1/20/2020 0321 by Mami Evangelista RN  Outcome: Ongoing  1/19/2020 1710 by Spencer Caal LPN  Outcome: Ongoing  Goal: Ability to maintain a clear airway will improve  Description  Ability to maintain a clear airway will improve  1/20/2020 0321 by Mami Evangelista RN  Outcome: Ongoing  1/19/2020 1710 by Spencer Caal LPN  Outcome: Ongoing     Problem: Fluid Volume - Deficit:  Goal: Achieves intake and output within specified parameters  Description  Achieves intake and output within specified parameters  1/20/2020 0321 by Mami Evangelista RN  Outcome: Ongoing  1/19/2020 1710 by Spencer Caal LPN  Outcome: Ongoing     Problem: Gas Exchange - Impaired:  Goal: Levels of oxygenation will improve  Description  Levels of oxygenation will improve  1/20/2020 0321 by Mami Evangelista RN  Outcome: Ongoing  1/19/2020 1710 by Spencer Caal LPN  Outcome: Ongoing     Problem: Hyperthermia:  Goal: Ability to maintain a body temperature in the normal range will improve  Description  Ability to maintain a body temperature in the normal range will improve  1/20/2020 0321 by Mami Evangelista RN  Outcome: Ongoing  1/19/2020 1710 by Spencer Caal LPN  Outcome: Ongoing     Problem: Tobacco Use:  Goal: Will participate in inpatient tobacco-use cessation counseling  Description  Will participate in inpatient tobacco-use cessation counseling  1/20/2020 0321 by Cindy Llamas RN  Outcome: Ongoing  1/19/2020 1710 by Rubi Garrido LPN  Outcome: Ongoing     Problem: Activity:  Goal: Capacity to carry out activities will improve  Description  Capacity to carry out activities will improve  1/20/2020 0321 by Cindy Llamas RN  Outcome: Ongoing  1/19/2020 1710 by Rubi Garrido LPN  Outcome: Ongoing  Goal: Fatigue will decrease  Description  Fatigue will decrease  1/20/2020 0321 by Cindy Llamas RN  Outcome: Ongoing  1/19/2020 1710 by Rubi Garrido LPN  Outcome: Ongoing  Goal: Energy level will increase  Description  Energy level will increase  1/20/2020 0321 by Cindy Llamas RN  Outcome: Ongoing  1/19/2020 1710 by Rubi Garrido LPN  Outcome: Ongoing  Goal: Ability to implement measures to reduce episodes of fatigue will improve  Description  Ability to implement measures to reduce episodes of fatigue will improve  1/20/2020 0321 by Cindy Llamas RN  Outcome: Ongoing  1/19/2020 1710 by Rubi Garrido LPN  Outcome: Ongoing     Problem: Coping:  Goal: Ability to identify and develop effective coping behavior will improve  Description  Ability to identify and develop effective coping behavior will improve  1/20/2020 0321 by Cindy Llamas RN  Outcome: Ongoing  1/19/2020 1710 by Rubi Garrido LPN  Outcome: Ongoing  Goal: Ability to identify and utilize available resources and services will improve  Description  Ability to identify and utilize available resources and services will improve  1/20/2020 0321 by Cindy Llamas RN  Outcome: Ongoing  1/19/2020 1710 by Rubi Garrido LPN  Outcome: Ongoing     Problem: Risk for Impaired Skin Integrity  Goal: Tissue integrity - skin and mucous membranes  Description  Structural intactness and normal physiological function of skin and  mucous membranes.   1/20/2020 0321 by Cindy Llamas RN  Outcome: Ongoing  1/19/2020 1710 by Rubi Garrido LPN  Outcome: Ongoing     Problem: Nutrition  Goal: Optimal

## 2020-01-21 NOTE — PROGRESS NOTES
Pharmacy Consult      Judson Holt is a 62 y.o. male for whom pharmacy has been consulted to dose posaconazole oral tablets for a mold lung infection. Allergies:  Pcn [penicillins]     Recent Labs     01/19/20  0420 01/21/20  0429   CREATININE <0.5 <0.5       Ht/Wt:   Ht Readings from Last 1 Encounters:   01/21/20 5' 9\" (1.753 m)        Wt Readings from Last 1 Encounters:   01/21/20 98 lb (44.5 kg)         CrCl cannot be calculated (This lab value cannot be used to calculate CrCl because it is not a number: <0.5). Assessment/Plan:  Dosage:   Table 4: Dosage for Noxafil Delayed-Release Tablets    Dose and Duration of Therapy     Loading dose: 300 mg (three 100 mg delayed-release tablets) twice a day on the first day. Maintenance dose: 300 mg (three 100 mg delayed-release tablets) once a day, starting on the second day. Duration of therapy is based on recovery from neutropenia or immunosuppression          Thank you for the consult.      Electronically signed by Annabell Harada, [address] [place] [address] on 1/21/2020 at 5:32 PM

## 2020-01-21 NOTE — PROGRESS NOTES
Spoke to [employer], PCA's who stated that they were worried about the pt because he was saying statements like \"I want to die. \" Spoke to pt about this and he stated that he does not want to do this anymore. Pt continued to say that he wanted to die and not feel anything when it happened. I spoke to pt about his wishes and hospice. Pt stated that this would be something he would be interested in. Dr Chico Gallo made aware. Palliative care consult placed per Dr. Vannesa Gallo.      Electronically signed by Deana Khan RN on 1/21/2020 at 12:46 PM

## 2020-01-21 NOTE — PLAN OF CARE
Problem: Falls - Risk of:  Goal: Will remain free from falls  Description  Will remain free from falls  1/21/2020 0840 by Rony Long RN  Outcome: Ongoing  1/21/2020 0323 by Moises Grissom RN  Outcome: Ongoing  Goal: Absence of physical injury  Description  Absence of physical injury  1/21/2020 0840 by Rony Long RN  Outcome: Ongoing  1/21/2020 0323 by Moises Grissom RN  Outcome: Ongoing     Problem: Infection:  Goal: Will remain free from infection  Description  Will remain free from infection  1/21/2020 0840 by Rony Long RN  Outcome: Ongoing  1/21/2020 0323 by Moises Grissom RN  Outcome: Ongoing     Problem: Safety:  Goal: Free from accidental physical injury  Description  Free from accidental physical injury  1/21/2020 0840 by Rony Long RN  Outcome: Ongoing  1/21/2020 0323 by Moises Grissom RN  Outcome: Ongoing  Goal: Free from intentional harm  Description  Free from intentional harm  Outcome: Ongoing     Problem: Daily Care:  Goal: Daily care needs are met  Description  Daily care needs are met  1/21/2020 0840 by Rony Long RN  Outcome: Ongoing  1/21/2020 0323 by Moises Grissom RN  Outcome: Ongoing     Problem: Pain:  Goal: Patient's pain/discomfort is manageable  Description  Patient's pain/discomfort is manageable  1/21/2020 0840 by Rony Long RN  Outcome: Ongoing  1/21/2020 0323 by Moises Grissom RN  Outcome: Ongoing     Problem: Skin Integrity:  Goal: Skin integrity will stabilize  Description  Skin integrity will stabilize  1/21/2020 0840 by Rony Long RN  Outcome: Ongoing  1/21/2020 0323 by Moises Grissom RN  Outcome: Ongoing     Problem: Discharge Planning:  Goal: Patients continuum of care needs are met  Description  Patients continuum of care needs are met  1/21/2020 0840 by Rony Long RN  Outcome: Ongoing  1/21/2020 0323 by Moises Grissom RN  Outcome: Ongoing     Problem: Discharge Planning:  Goal: Discharged to appropriate level of care  Description  Discharged to appropriate level of care  1/21/2020 0840 by Salvatore Castro RN  Outcome: Ongoing  1/21/2020 0323 by Susana Stewart RN  Outcome: Ongoing  Goal: Participates in care planning  Description  Participates in care planning  1/21/2020 0840 by Salvatore Castro RN  Outcome: Ongoing  1/21/2020 0323 by Susana Stewart RN  Outcome: Ongoing     Problem: Airway Clearance - Ineffective:  Goal: Clear lung sounds  Description  Clear lung sounds  1/21/2020 0840 by Salvatore Castro RN  Outcome: Ongoing  1/21/2020 0323 by Susana Stewart RN  Outcome: Ongoing  Goal: Ability to maintain a clear airway will improve  Description  Ability to maintain a clear airway will improve  1/21/2020 0840 by Salvatore Castro RN  Outcome: Ongoing  1/21/2020 0323 by Susana Stewart RN  Outcome: Ongoing     Problem: Fluid Volume - Deficit:  Goal: Achieves intake and output within specified parameters  Description  Achieves intake and output within specified parameters  1/21/2020 0840 by Salvatore Castro RN  Outcome: Ongoing  1/21/2020 0323 by Susana Stewart RN  Outcome: Ongoing     Problem: Gas Exchange - Impaired:  Goal: Levels of oxygenation will improve  Description  Levels of oxygenation will improve  1/21/2020 0840 by Salvatore Castro RN  Outcome: Ongoing  1/21/2020 0323 by Susana Stewart RN  Outcome: Ongoing     Problem: Hyperthermia:  Goal: Ability to maintain a body temperature in the normal range will improve  Description  Ability to maintain a body temperature in the normal range will improve  1/21/2020 0840 by Salvatore Castro RN  Outcome: Ongoing  1/21/2020 0323 by Susana Stewart RN  Outcome: Ongoing     Problem: Tobacco Use:  Goal: Will participate in inpatient tobacco-use cessation counseling  Description  Will participate in inpatient tobacco-use cessation counseling  1/21/2020 0840 by Salvatore Castro RN  Outcome: Ongoing  1/21/2020 0323 by Susana Stewart RN  Outcome: Ongoing     Problem:  Activity:  Goal: Capacity to carry out activities will improve  Description  Capacity to carry out activities will improve  1/21/2020 0840 by Timmy Montes RN  Outcome: Ongoing  1/21/2020 0323 by Shea Faith RN  Outcome: Ongoing  Goal: Fatigue will decrease  Description  Fatigue will decrease  1/21/2020 0840 by Timmy Montes RN  Outcome: Ongoing  1/21/2020 0323 by Shea Faith RN  Outcome: Ongoing  Goal: Energy level will increase  Description  Energy level will increase  1/21/2020 0840 by Timmy Montes RN  Outcome: Ongoing  1/21/2020 0323 by Shea Faith RN  Outcome: Ongoing  Goal: Ability to implement measures to reduce episodes of fatigue will improve  Description  Ability to implement measures to reduce episodes of fatigue will improve  1/21/2020 0840 by Timmy Montes RN  Outcome: Ongoing  1/21/2020 0323 by Shea Faith RN  Outcome: Ongoing     Problem: Coping:  Goal: Ability to identify and develop effective coping behavior will improve  Description  Ability to identify and develop effective coping behavior will improve  1/21/2020 0840 by Timmy Montes RN  Outcome: Ongoing  1/21/2020 0323 by Shea Faith RN  Outcome: Ongoing  Goal: Ability to identify and utilize available resources and services will improve  Description  Ability to identify and utilize available resources and services will improve  1/21/2020 0840 by Timmy Montes RN  Outcome: Ongoing  1/21/2020 0323 by Shea Faith RN  Outcome: Ongoing     Problem: Risk for Impaired Skin Integrity  Goal: Tissue integrity - skin and mucous membranes  Description  Structural intactness and normal physiological function of skin and  mucous membranes.   1/21/2020 0840 by Timmy Montes RN  Outcome: Ongoing  1/21/2020 0323 by Shea Faith RN  Outcome: Ongoing     Problem: Nutrition  Goal: Optimal nutrition therapy  Description  Nutrition Problem: Severe malnutrition, In context of chronic illness  Intervention: Food and/or Nutrient Delivery:

## 2020-01-21 NOTE — PROGRESS NOTES
Family Medicine Progress Note    Patient:  Dinora Wade  YOB: 1960    MRN: [id number]     Acct: [de-identified]     Admit date: 1/15/2020    Patient Seen, Chart, Consults notes, Labs, Radiology studies reviewed. 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 Continues to be very dyspneic. Copious amounts of purulent sputum. Past, Family, Social History unchanged from admission. Diet:  DIET GENERAL;  Dietary Nutrition Supplements: Standard High Calorie Oral Supplement    Medications:  Scheduled Meds:   furosemide  20 mg Oral Daily    megestrol  625 mg Oral Daily    meropenem  1 g Intravenous Q8H    famotidine  20 mg Oral BID    azithromycin  500 mg Oral Daily    moxifloxacin  400 mg Oral Daily    sodium chloride flush  10 mL Intravenous 2 times per day    enoxaparin  40 mg Subcutaneous Daily    ethambutol  800 mg Oral Daily    guaiFENesin  1,200 mg Oral BID    lactobacillus  1 capsule Oral Daily    rifampin  600 mg Oral Daily    ipratropium-albuterol  1 ampule Inhalation Q4H WA     Continuous Infusions:  PRN Meds:hydrOXYzine, HYDROcodone 5 mg - acetaminophen, sodium chloride flush, acetaminophen, albuterol sulfate HFA    Objective:    Vitals: /71   Pulse 110   Temp 98.6 °F (37 °C) (Temporal)   Resp 19   Ht 5' 9\" (1.753 m)   Wt 98 lb (44.5 kg)   SpO2 93%   BMI 14.47 kg/m²   24 hour intake/output:    Intake/Output Summary (Last 24 hours) at 1/21/2020 0844  Last data filed at 1/21/2020 0828  Gross per 24 hour   Intake 1090 ml   Output 1220 ml   Net -130 ml     Last 3 weights:   Wt Readings from Last 3 Encounters:   01/21/20 98 lb (44.5 kg)   12/08/19 100 lb 4 oz (45.5 kg)   08/14/19 107 lb (48.5 kg)       Physical Exam:    General Appearance:  in no acute distress, alert and cooperative  Skin:  negatives: mobility and turgor normal  Eyes:  No gross abnormalities. Neck:  neck- supple, no mass, non-tender  Lungs:  No chest wall tenderness. Heart:  Heart regular rate and rhythm  Abdomen: Auscultation: Normal bowel sounds. No bruits. Palpation: No masses, tenderness or organomegally.   Extremities: pulses present in all extremities and trace pedal edema  Musculoskeletal:  negative  Neurologic:  negative    CBC with Differential:    Lab Results   Component Value Date    WBC 9.1 01/21/2020    RBC 3.75 01/21/2020    HGB 9.9 01/21/2020    HCT 32.5 01/21/2020    HCT 46.5 02/09/2012     01/21/2020    PLT 90 02/09/2012    MCV 86.7 01/21/2020    MCH 26.4 01/21/2020    MCHC 30.5 01/21/2020    RDW 14.2 01/21/2020    LYMPHOPCT 7.3 01/21/2020    MONOPCT 12.8 01/21/2020    EOSPCT 0.8 02/09/2012    BASOPCT 0.8 01/21/2020    MONOSABS 1.20 01/21/2020    LYMPHSABS 0.7 01/21/2020    EOSABS 0.20 01/21/2020    BASOSABS 0.10 01/21/2020     CMP:    Lab Results   Component Value Date     01/21/2020     02/09/2012    K 4.1 01/21/2020    K 4.0 01/15/2020    K 4.0 02/09/2012    CL 95 01/21/2020     02/09/2012    CO2 25 01/21/2020    BUN 8 01/21/2020    CREATININE <0.5 01/21/2020    CREATININE 0.6 02/09/2012    LABGLOM >60 01/21/2020    GLUCOSE 109 01/21/2020    PROT 6.4 01/14/2020    PROT 5.7 09/03/2012    LABALBU 2.6 01/14/2020    LABALBU 4.4 02/09/2012    CALCIUM 8.2 01/21/2020    BILITOT <0.2 01/14/2020    ALKPHOS 96 01/14/2020    ALKPHOS 85 02/09/2012    AST 25 01/14/2020    ALT 27 01/14/2020     Last 3 Troponin:    Lab Results   Component Value Date    TROPONINI 0.02 01/15/2020    TROPONINI 0.01 12/08/2019    TROPONINI 0.03 12/08/2019     Urine Culture:  No components found for: CURINE  Blood Culture:  No components found for: CBLOOD, CFUNGUSBL  Stool Culture:  No components found for: CSTOOL    Assessment:    Principal Problem:    Pneumonia  Active Problems:    Panlobular emphysema (HCC)    Hyperlipidemia    Hypertension    Nicotine dependence,

## 2020-01-21 NOTE — PROGRESS NOTES
Infectious Diseases Progress Note    Patient:  Lawson Salmeron  YOB: 1960  MRN: [id number]   Admit date: 1/15/2020   Admitting Physician: Aries Hathaway MD  Primary Care Physician: Aries Hathaway MD    Chief Complaint/Interval History: He indicates he had a rough night. Just overall feels poorly. He continues to produce a large amount of brownish sputum. He is producing cups of sputum per day. He is without fever. He is without pleuritic pain. He attempted the vibrating/pneumatic vest yesterday. He does not feel he can tolerate use of that device. He indicates he is planning on nursing home placement at discharge. He indicates that if he does not get better he may need a different place. I asked what he meant. He seemed to imply a place where he could get palliative/hospice care and medicines to help him completely avoid suffering. We talked at length this morning. He asked me if I thought he was going to get better. I told him he has underlying severe chronic obstructive pulmonary disease for which the natural history tends to be slow progression over time. I explained he has additional complicating factors including the mycobacterial disease, possible mold infection, and that he had been admitted with possible bacterial pneumonia. I explained all of these complicating factors may make recovery difficult. He indicates he is hoping to get better, but if he is not able to improve he is basically suffering too much to want to live. He indicates he does not want to suffer/die feeling like he is suffocating. He indicates if he transitions to the point where he is receiving comfort/palliative care only that he would want enough sedation and not to feel dyspneic as he passes. He indicates he has an ex-wife who is assisted him. He indicates he has 1 son. He indicates he has not identified a power of  and he does not have a well.   I explained the hospital has services that could assist him with those documents if he would like to have them completed. I talked about the only options I can think of to help maximize his treatment. Posaconazole would be reasonable attempt to cover possible mold in the lung. Spoke with pharmacy yesterday. That appears to be safer than voriconazole. Although still with some drug drug interactions between posaconazole and his current medicines, there did not appear to be a contraindication. Talked with him again about the addition of IV amikacin. Explained there is risk of permanent hearing loss and vertigo/vestibular toxicity. Also explained there is risk of renal dysfunction which at times can be reversible and other times permanent. He is agreeable to the risks of posaconazole and amikacin and attempt to maximize his treatment. His nurse for today was present for all of the above discussion. In/Out    Intake/Output Summary (Last 24 hours) at 1/21/2020 0848  Last data filed at 1/21/2020 0828  Gross per 24 hour   Intake 1090 ml   Output 1220 ml   Net -130 ml     Allergies:    Allergies   Allergen Reactions    Pcn [Penicillins]      Current Meds: hydrOXYzine (ATARAX) tablet 10 mg, TID PRN  HYDROcodone-acetaminophen (NORCO) 5-325 MG per tablet 1 tablet, Q6H PRN  furosemide (LASIX) tablet 20 mg, Daily  megestrol (MEGACE ES) 625 MG/5ML suspension 625 mg, Daily  meropenem (MERREM) 1 g in sodium chloride 0.9 % 100 mL IVPB (mini-bag), Q8H  famotidine (PEPCID) tablet 20 mg, BID  azithromycin (ZITHROMAX) tablet 500 mg, Daily  moxifloxacin (AVELOX) tablet 400 mg, Daily  sodium chloride flush 0.9 % injection 10 mL, 2 times per day  sodium chloride flush 0.9 % injection 10 mL, PRN  acetaminophen (TYLENOL) tablet 650 mg, Q4H PRN  enoxaparin (LOVENOX) injection 40 mg, Daily  albuterol sulfate  (90 Base) MCG/ACT inhaler 2 puff, Q6H PRN  ethambutol (MYAMBUTOL) tablet 800 mg, Daily  guaiFENesin (MUCINEX) extended release tablet 1,200 mg, BID  lactobacillus (CULTURELLE) capsule 1 capsule, Daily  rifampin (RIFADIN) capsule 600 mg, Daily  ipratropium-albuterol (DUONEB) nebulizer solution 1 ampule, Q4H WA      Review of Systems see HPI    VitalSigns:  /71   Pulse 110   Temp 98.6 °F (37 °C) (Temporal)   Resp 19   Ht 5' 9\" (1.753 m)   Wt 98 lb (44.5 kg)   SpO2 93%   BMI 14.47 kg/m²      Physical Exam  Line/IV site: No erythema, warmth, induration, or tenderness. Lungs with few basilar crackles. No wheezing. Heart without murmur  Extremities edematous. Lab Results:  CBC:   Recent Labs     01/19/20  0420 01/21/20  0429   WBC 12.2* 9.1   HGB 9.4* 9.9*    429*     BMP:  Recent Labs     01/19/20  0420 01/21/20  0429   * 130*   K 4.2 4.1   CL 96* 95*   CO2 25 25   BUN 7* 8   CREATININE <0.5 <0.5   GLUCOSE 91 109     CultureResults:  Fungal sputum culture January 18, 2020:  Fungus (Mycology) Culture Abnormal  01/18/2020  9:00 AM [facility]   Mold Isolated, sent to [facility] for Identification    KOH Prep 01/18/2020  9:00 AM [facility]   No Fungal elements seen    Organism Abnormal  01/18/2020  9:00 AM [facility]   Mold    Fungus (Mycology) Culture 01/18/2020  9:00 AM [facility]   ID to follow   Sent to [facility]     Radiology: None    Additional Studies Reviewed:  None    Impression:  1. Advanced chronic obstructive pulmonary disease  2. Possible right middle/lower lobe pneumonia  3. 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   Possible mold infection-old recovered from 2 pulmonary cultures    Recommendations:  Complicated combination of problems  He remains stable but severely ill  He has a large amount of sputum production  Hard to know if we will be able to help him feel better/recover  To help maximize treatment of the above problems going to add intravenous amikacin and oral posaconazole  Patient is agreeable with risks  See HPI  Continue to follow    PHIL Tabitha Snowden MD

## 2020-01-21 NOTE — PROGRESS NOTES
Request from pt for palliative care to see pt. Per note from pt RN pt stating \"I just want to die\". Palliative consulted. Palliative is currently following this pt. This nurse and PC  met with pt to address his concerns. Pt has concern that he will \"suffocate to death\" tells me \"I don't want this to happen\". Pt tells me he wants to be unconscious, asking about medications that would assist with this. PC nurse explained to pt that this is not what palliative/hospice does. Pt wants clarification. This was explained to pt that meds can be administered PO for comfort, should PO meds fail keeping him comfortable then gtts can be ordered by MD per their discretion for tx/comfort  Pt also talks with us about his HCS and code status. Pt tells me \"If I'm unconscious  then let me go, but if I'm awake then do everything\"  Further questioned pt d/t his conversation with ex wife on the phone with [facility] [place] nurse present that he wants to \"die\" and does not want to \"suffocate\". Pt repeats again saying \"If I'm unconscious then let me die, in other words if my heart is not beating then let me go. But if I'm conscious then do everything\". He would like for his son and ex wife to be his HCS. A meeting with /RN and pt/family set for tomorrow at 10 a.m. Pt wants to know why he can't stay in the hospital and be comfortable with hospice care. This was also explained to pt. Explained to pt that at this time he does not exhibit sx that meet criteria for in pt, however, he could go to [facility] with hospice to follow and manage medications. Also explained to pt should he elect hospice with medicare benefit then he would be responsible for room and board, however he could apply for medicaid in hopes this would assist with cost of room and board should he qualify. Pt states \"I don't know what to do\". PC nurse along with  provided emotional support. Offered prayer from , pt did not want prayer at this time.   PC will meet with pt in the morning as requested for ongoing discussion for goals of care, advance care planning, HCS.     Electronically signed by Vivian Ryan RN on 1/21/2020 at 2:13 PM

## 2020-01-21 NOTE — PROGRESS NOTES
Visited with pt to provide spiritual care, visited with Trang Singh, Palliative Care RN. Pt shared his concerned about his inability to breathe and wishes about not being able to breathe and future possible plans. Pt says he doesn't want to suffocate. Nely Vinson had an in depth discussion about pt's wishes, Hospice, and pt's code status. Pt seems to say he wants assistance if he is conscious but if his heart stops he wants to go ahead and die. Nelygarcía Vinson spoke with pt about possibly going to a SNF with Hospice or going to a SNF skilled with Hospice to follow later. Nely Vinson explained since the pt does not have Medicaid he would be required to pay for room and board or apply for Medicaid if he went to a SNF with Hospice. Pt says \"I don't know what to do? \" Nely Karel asked if pt wanted Palliative Care to meet with him and his family support, his ex-wife, and son in the morning. A meeting was arranged for tomorrow morning at 10:00 AM. Pt was asked if he wanted prayer but he refused. Palliative to meet with pt and family in the morning and continue to provide support, and on-going discussion for goals of care, and assistance as needed.     Electronically signed by Ramona Damon on 1/21/2020 at 2:00 PM

## 2020-01-21 NOTE — PROGRESS NOTES
Pulmonary and Critical Care Progress Note [address] [facility]    Patient: Dinora Wade  1960   MR# [id number]   Acct# [de-identified]  01/21/20   8:49 AM  Referring Provider: Jacek Jones MD    Chief Complaint: Shortness of breath, pneumonia  Interval history: The patient is resting in bed on 4 L NC. The patient states that he is not getting any better and that he does not want to live like this. Explained that he needs to discuss his wishes with his PCP. The patient is requesting Pulmicort. We discussed that we do not recommend a steroid for him at this time given his current infection status. No family at bedside. No other aggravating or alleviating factors or associated symptoms. Medications: furosemide, 20 mg, Oral, Daily    megestrol, 625 mg, Oral, Daily    meropenem, 1 g, Intravenous, Q8H    famotidine, 20 mg, Oral, BID    azithromycin, 500 mg, Oral, Daily    moxifloxacin, 400 mg, Oral, Daily    sodium chloride flush, 10 mL, Intravenous, 2 times per day    enoxaparin, 40 mg, Subcutaneous, Daily    ethambutol, 800 mg, Oral, Daily    guaiFENesin, 1,200 mg, Oral, BID    lactobacillus, 1 capsule, Oral, Daily    rifampin, 600 mg, Oral, Daily    ipratropium-albuterol, 1 ampule, Inhalation, Q4H WA   Review of Systems: Review of Systems   Constitutional: Positive for fatigue. Negative for chills and fever. Respiratory: Positive for cough and shortness of breath. Cardiovascular: Negative for chest pain. Gastrointestinal: Negative for diarrhea, nausea and vomiting. Neurological: Positive for weakness. Physical Exam:  Blood pressure 111/71, pulse 110, temperature 98.6 °F (37 °C), temperature source Temporal, resp. rate 19, height 5' 9\" (1.753 m), weight 98 lb (44.5 kg), SpO2 93 %.     Intake/Output Summary (Last 24 hours) at 1/21/2020 0832  Last data filed at 1/21/2020 0828  Gross per 24 hour   Intake 1090 ml   Output 1220 ml   Net -130 ml     Physical Exam  Vitals signs and ID  · Continue bronchodilators as is, do not add pulmicort   · IS/flutter, mobilize   · Respiratory culture with mold, pending ARUP  · Recommend palliative care consult if the patient truly wishes to not [place] this way anymore\"    Electronically signed by Hiro Jacobsen on 1/21/2020 at 8:49 AM     Physician Substantive portion:  Patient is without new complaints. Respiratory culture is growing a mold which is being worked up and I discussed this with Dr. Donny Carl. The patient is very concerned about his medications and Pulmicort and Advair and things like that that has had over the years. We discussed I am trying to avoid exposing him to any additional steroids because he needs every last shred of his immune system to try to help get rid of these infections and we will need to continue treating him with nonsteroidal bronchodilators. We will continue him on DuoNeb as he is receiving now. I have recommended the vest but he has declined that repeatedly. Nothing else to do other than now. I discussed his case with Dr. Donny Carl and also Dr. Maye Liu. He tried emphasized to the patient that he is chronically ill and the problems that he are is having right now are going to stick around for the most part and some aspects will be permanent, and he is not recovering from any of them very well    I have seen and examined patient personally, performing a face-to-face diagnostic evaluation with plan of care reviewed and developed with APRN and nursing staff. I have addended and/or modified the above history of present illness, physical examination, and assessment and plan to reflect my findings and impressions. Essential elements of the care plan were discussed with APRN above. Agree with findings and assessment/plan as documented above.     Electronically signed by Adeel Castellanos, on 1/21/2020, 10:42 AM

## 2020-01-22 NOTE — PROGRESS NOTES
Infectious Diseases Progress Note    Patient:  Mabel Salas  YOB: 1960  MRN: [id number]   Admit date: 1/15/2020   Admitting Physician: Rohit Sheppard MD  Primary Care Physician: Rohit Sheppard MD    Chief Complaint/Interval History: He feels about the same. Sputum production may be slightly less. No new symptoms. No hemoptysis. He is without fever. He met with palliative care services today. He also indicates he has a bed at [address]. He wants to continue current treatment. He reiterated symptoms progressed and he is not recovering, if he transitions to hospice care he does not want to have difficulty breathing or suffering. Talked with him again about institution of amikacin which can cause irreversible damage to the kidney or ears. He is agreeable with treatment. Have been communicated with pharmacy regarding dosing. In/Out    Intake/Output Summary (Last 24 hours) at 1/22/2020 1636  Last data filed at 1/22/2020 1426  Gross per 24 hour   Intake 160 ml   Output 1400 ml   Net -1240 ml     Allergies:    Allergies   Allergen Reactions    Pcn [Penicillins]      Current Meds: posaconazole (NOXAFIL) tablet 300 mg, BID WC    And  [START ON 1/23/2020] posaconazole (NOXAFIL) tablet 300 mg, Daily with breakfast  hydrOXYzine (ATARAX) tablet 10 mg, TID PRN  HYDROcodone-acetaminophen (NORCO) 5-325 MG per tablet 1 tablet, Q6H PRN  furosemide (LASIX) tablet 20 mg, Daily  megestrol (MEGACE ES) 625 MG/5ML suspension 625 mg, Daily  meropenem (MERREM) 1 g in sodium chloride 0.9 % 100 mL IVPB (mini-bag), Q8H  famotidine (PEPCID) tablet 20 mg, BID  azithromycin (ZITHROMAX) tablet 500 mg, Daily  moxifloxacin (AVELOX) tablet 400 mg, Daily  sodium chloride flush 0.9 % injection 10 mL, 2 times per day  sodium chloride flush 0.9 % injection 10 mL, PRN  acetaminophen (TYLENOL) tablet 650 mg, Q4H PRN  enoxaparin (LOVENOX) injection 40 mg, Daily  albuterol sulfate  (90 Base) MCG/ACT inhaler 2 puff, Q6H PRN  ethambutol (MYAMBUTOL) tablet 800 mg, Daily  guaiFENesin (MUCINEX) extended release tablet 1,200 mg, BID  lactobacillus (CULTURELLE) capsule 1 capsule, Daily  rifampin (RIFADIN) capsule 600 mg, Daily  ipratropium-albuterol (DUONEB) nebulizer solution 1 ampule, Q4H WA      Review of Systems see HPI    VitalSigns:  /68   Pulse 102   Temp 98.9 °F (37.2 °C) (Temporal)   Resp 24   Ht 5' 9\" (1.753 m)   Wt 98 lb (44.5 kg)   SpO2 93%   BMI 14.47 kg/m²      Physical Exam  Line/IV (peripheral) site: No erythema, warmth, induration, or tenderness. Lungs fairly clear at present. No significant crackles. No wheezing. Abdomen soft nontender  Extremities trace edema    Lab Results:  CBC:   Recent Labs     01/21/20  0429   WBC 9.1   HGB 9.9*   *     BMP:  Recent Labs     01/21/20  0429   *   K 4.1   CL 95*   CO2 25   BUN 8   CREATININE <0.5   GLUCOSE 109     CultureResults:  Sputum fungal culture:  KOH Prep 01/18/2020  9:00 AM [facility]   No Fungal elements seen    Organism Abnormal  01/18/2020  9:00 AM [facility]   Mold    Fungus (Mycology) Culture 01/18/2020  9:00 AM [facility]   No further workup-please refer to respiratory culture- sputum collected   [email or web address] for ID information      Radiology: None    Additional Studies Reviewed:  None    Impression:  1. Advanced chronic obstructive pulmonary disease  2. Possible right middle/lower lobe pneumonia-he has completed a course of meropenem treatment  3. 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 To help maximize treatment given his condition going to add amikacin 500 mg IV every Monday Wednesday Friday for 8 weeks. 4.  Possible mold infection in the lung-mold recovered from 2 cultures. Posaconazole added. Awaiting identification.     Recommendations:  He indicates he is planning to go to [address] [place] [address] and ready for discharge  From ID standpoint likely ready for discharge end of the week if he remains stable  Suggest PICC line placement  Discontinue meropenem  Continue azithromycin/ethambutol/rifampin/moxifloxacin orally  Continue posaconazole  Add amikacin 500 mg IV every Monday Wednesday Friday for 8 weeks  See nursing home antimicrobial recommendations as outlined below    Nursing home antimicrobial recommendations:  1. Diagnosis-severe chronic obstructive pulmonary disease, Mycobacterium xenopi infection in the lung,  Possible pulmonary mold infection (identification pending)  2. IV access-PICC line  3. Outpatient primary care Lizzie Terry MD  Outpatient Igor Felix MD  4. Antibiotic recommendation:  Azithromycin 500 mg orally daily-ongoing  Ethambutol 800 mg orally daily-ongoing  Rifampin 600 mg orally daily-ongoing  Moxifloxacin 400 mg orally daily-ongoing  Posaconazole 300 mg orally daily-ongoing  Amikacin 500 mg IV every Monday, Wednesday, Friday for 8 weeks  Stop amikacin after the Monday March 16, 2020 dose  7. Laboratory monitoring:  CMP and CBC weekly while on amikacin treatment  Draw amikacin trough prior to the dose on January 29, 2020  Draw amikacin trough prior to dose on February 5, 2020  The amikacin troughs should be below the level of detection on the reference range  8. Discontinue amikacin if he complains of any hearing difficulty, aural fullness, vertigo, or if there is a decline in renal function  9. Follow-up appointment 2 to 3 weeks after hospital discharge-patient had had difficulty as an outpatient keeping follow-up appointments. If he is unable to travel nursing home attending can call so that we can review his treatment and labs.     Rain Padilla MD

## 2020-01-22 NOTE — CARE COORDINATION
NAYANA contacted Dr. Italia Marie office, spoke w/ nurse updated on dc plan of [facility] for skilled days (20 medicare days) and plan to apply for medicaid with eventual hospice/comfort measures. Johanna Rawls will need to be able to \"skill\" the pt during the first 20 days; and currently pt is not participating in PT/OT. Did inquire if pt would continue IV abx at dc. Nurse will ask Dr. Richard Villavicencio and will contact NAYANA back.     Electronically signed by Garrett Singh on 1/22/2020 at 3:56 PM

## 2020-01-22 NOTE — PROGRESS NOTES
Palliative Care: Follow up visit with Bipin Schwarz to complete pt's living will. Pt's ex-wife and son are present. We reviewed his wishes and he completed the documents, naming his son and ex-wife as HCS. After discussion he also has elected DNR. We then talked about his plan. He is accepting to go to [place] at discharge. He will have skilled up to 20 days as long as he qualifies. They are planning to apply for medicaid to be in place as a secondary. He has a hospice consult and will elect hospice after skill has ended. Pt is very concerned about his finances and what will be covered. Reassured him and family that the facility staff will work with them and help them through the process. Hospice is aware of plan and will follow pt at the NH, and admit him at any time. Provided support and answered.     Electronically signed by aSrah Reina RN on 1/22/2020 at 10:59 AM

## 2020-01-22 NOTE — PROGRESS NOTES
Pulmonary and Critical Care Progress Note [facility]    Patient: Natividad Lopez  1960   MR# [id number]   Acct# [de-identified]  01/22/20   9:05 AM  Referring Provider: Wagner Ramires MD    Chief Complaint: Shortness of breath, pneumonia  Interval history: The patient is resting in bed on 4 L NC. Notes from palliative care and ID reviewed. The patient has had a Esparza catheter placed due to having extreme shortness of breath when he was standing to void. He is considering being placed at [facility]. His family is coming in to meet with the palliative care team this morning. He continues to verbalize that he does not want to \"smother to death\". He tells me that he wanted to be placed on the ventilator last night. I explained to him that at this point he does not meet criteria to be placed on the ventilator nor is that recommended for him. He states understanding that he is unable to return home to care for himself at this point. No other aggravating or alleviating factors or associated symptoms. Medications:   posaconazole, 300 mg, Oral, BID WC **AND** [START ON 1/23/2020] posaconazole, 300 mg, Oral, Daily with breakfast    furosemide, 20 mg, Oral, Daily    megestrol, 625 mg, Oral, Daily    meropenem, 1 g, Intravenous, Q8H    famotidine, 20 mg, Oral, BID    azithromycin, 500 mg, Oral, Daily    moxifloxacin, 400 mg, Oral, Daily    sodium chloride flush, 10 mL, Intravenous, 2 times per day    enoxaparin, 40 mg, Subcutaneous, Daily    ethambutol, 800 mg, Oral, Daily    guaiFENesin, 1,200 mg, Oral, BID    lactobacillus, 1 capsule, Oral, Daily    rifampin, 600 mg, Oral, Daily    ipratropium-albuterol, 1 ampule, Inhalation, Q4H WA   Review of Systems: Review of Systems   Constitutional: Positive for fatigue. Negative for chills and fever. Respiratory: Positive for cough and shortness of breath. Cardiovascular: Negative for chest pain.    Gastrointestinal: Negative for diarrhea, nausea and vomiting. Genitourinary: Positive for difficulty urinating. Neurological: Positive for weakness. Psychiatric/Behavioral: Positive for dysphoric mood. The patient is nervous/anxious. Physical Exam:  Blood pressure 108/68, pulse 102, temperature 98.9 °F (37.2 °C), temperature source Temporal, resp. rate 24, height 5' 9\" (1.753 m), weight 98 lb (44.5 kg), SpO2 93 %. Intake/Output Summary (Last 24 hours) at 1/22/2020 0905  Last data filed at 1/21/2020 2302  Gross per 24 hour   Intake 360 ml   Output 760 ml   Net -400 ml     Physical Exam  Vitals signs and nursing note reviewed. Constitutional:       General: He is not in acute distress. Appearance: He is cachectic. He is ill-appearing. Interventions: Nasal cannula in place. HENT:      Head: Normocephalic and atraumatic. Eyes:      Pupils: Pupils are equal, round, and reactive to light. Neck:      Musculoskeletal: Normal range of motion and neck supple. Cardiovascular:      Rate and Rhythm: Normal rate and regular rhythm. Heart sounds: Normal heart sounds. Pulmonary:      Effort: Prolonged expiration present. Breath sounds: Decreased breath sounds and rhonchi (few) present. No wheezing or rales. Comments: Frequent congested sounding cough producing gray-colored sputum  Chest:      Chest wall: No tenderness. Abdominal:      General: Bowel sounds are normal.      Palpations: Abdomen is soft. Musculoskeletal: Normal range of motion. General: Swelling (Bilateral ankles) present. Skin:     General: Skin is warm and dry. Neurological:      Mental Status: He is alert and oriented to person, place, and time. Psychiatric:         Mood and Affect: Mood is depressed. Affect is flat. Behavior: Behavior normal.         Thought Content:  Thought content normal.         Cognition and Memory: Cognition and memory normal.       Recent Labs     01/21/20  0429   WBC 9.1   RBC 3.75*   HGB 9.9*   HCT 32.5* *   MCV 86.7   MCH 26.4*   MCHC 30.5*   RDW 14.2      Recent Labs     01/21/20  0429   *   K 4.1   CL 95*   CO2 25   BUN 8   CREATININE <0.5   CALCIUM 8.2*   GLUCOSE 109      No results for input(s): BC, LABGRAM, CULTRESP, BFCX in the last 72 hours. Radiograph:   My radiograph interpretation: none to review today    Pulmonary Assessment:  1. Middle lobe and right lower lobe infiltrate  2. Atypical Mycobacterium infection (M xenopi)  3. Possible mold infection- being treated with oral posaconazole  4. Acute on chronic hypoxemic respiratory failure, on 3 L  5. Very severe COPD  6. Cachexia  7. GERD  8. Severe malnutrition  9. Hyponatremia - defer to attending    Recommend:   · Continue supplemental oxygen and titrate as tolerated, baseline 3L  · Antibiotics per ID  · Posaconazole per ID  · Continue bronchodilators as is, do not add pulmicort   · IS/flutter, mobilize. Patient continues to refuse CPT treatments. · Respiratory culture with mold, pending ARUP  · Appreciate palliative care input and support. Electronically signed by Todd Crawford on 1/22/2020 at 9:05 AM    Physician Substantive portion:  Pt continues to cough copious sputum. Exam shows no distress. He has cup at bedside full of brown purulent sputum. Chest has crackles, rhonchi. Pt cachectic. Plan continue tx possible mixed infection per ID. Reinforced the chronic nature of this problem. Pt considering post discharge options, nursing home    I have seen and examined patient personally, performing a face-to-face diagnostic evaluation with plan of care reviewed and developed with APRN and nursing staff. I have addended and/or modified the above history of present illness, physical examination, and assessment and plan to reflect my findings and impressions. Essential elements of the care plan were discussed with APRN above. Agree with findings and assessment/plan as documented above.     Electronically signed by Hal Orourke

## 2020-01-22 NOTE — PROGRESS NOTES
Family Medicine Progress Note    Patient:  Jerome Menchaca  YOB: 1960    MRN: [id number]     Acct: [de-identified]     Admit date: 1/15/2020    Patient Seen, Chart, Consults notes, Labs, Radiology studies reviewed. Subjective: Day 7 of stay with pneumonia, persistent Mycobacterium infection, and late stage COPD and most recent (in last 24 hours) has had no significant improvement. Continues to complain of significant dyspnea. Continues to have purulent sputum production. Has met with palliative care. Over yesterday voiced possibility of hospice care intimating he would \"rather die\" than continue with the shortness of breath. Past, Family, Social History unchanged from admission.     Diet:  DIET GENERAL;  Dietary Nutrition Supplements: Standard High Calorie Oral Supplement    Medications:  Scheduled Meds:   posaconazole  300 mg Oral BID WC    And    [START ON 1/23/2020] posaconazole  300 mg Oral Daily with breakfast    furosemide  20 mg Oral Daily    megestrol  625 mg Oral Daily    meropenem  1 g Intravenous Q8H    famotidine  20 mg Oral BID    azithromycin  500 mg Oral Daily    moxifloxacin  400 mg Oral Daily    sodium chloride flush  10 mL Intravenous 2 times per day    enoxaparin  40 mg Subcutaneous Daily    ethambutol  800 mg Oral Daily    guaiFENesin  1,200 mg Oral BID    lactobacillus  1 capsule Oral Daily    rifampin  600 mg Oral Daily    ipratropium-albuterol  1 ampule Inhalation Q4H WA     Continuous Infusions:  PRN Meds:hydrOXYzine, HYDROcodone 5 mg - acetaminophen, sodium chloride flush, acetaminophen, albuterol sulfate HFA    Objective:    Vitals: /68   Pulse 102   Temp 98.9 °F (37.2 °C) (Temporal)   Resp 24   Ht 5' 9\" (1.753 m)   Wt 98 lb (44.5 kg)   SpO2 93%   BMI 14.47 kg/m²   24 hour intake/output:    Intake/Output Summary (Last 24 hours) at 1/22/2020 0902  Last data filed at 1/21/2020 2302  Gross per 24 hour   Intake 360 ml   Output 760 ml   Net -400 ml Urine Culture:  No components found for: CURINE  Blood Culture:  No components found for: CBLOOD, CFUNGUSBL  Stool Culture:  No components found for: CSTOOL    Assessment:    Principal Problem:    Pneumonia  Active Problems:    Panlobular emphysema (HCC)    Hyperlipidemia    Hypertension    Nicotine dependence, cigarettes, uncomplicated    Acute exacerbation of chronic obstructive airways disease (HCC)    Severe malnutrition (HCC)    Chronic respiratory failure with hypoxia (HCC)    Chronic alcoholism in remission ([facility] [address]    Dependence on supplemental oxygen    Former heavy tobacco smoker    Major depression in remission ([facility] [address]    Mycobacterium infection  Resolved Problems:    * No resolved hospital problems. *          Plan:  Lengthy discussion with patient this morning. Talked about palliative care and hospice. Tried to explain that if he wishes to go to a skilled nursing facility true hospice would not be necessarily his best bet but that palliative care could be performed at a skilled nursing facility. He is very concerned about, and almost fixated on what Medicare will pay for. I encouraged him to meet with the care coordinator and discharge planners today. I believe they are meeting at 10:00 with he, his son, and his ex-wife. He tells me \"I do not know what I should do\". I tried to reassure him that there is no wrong answer and that it is completely his decision. I did attempt to delineate between hospice which would likely require him to go home with care versus skilled nursing facility with palliative care but I do not feel as though he completely understood. Hopefully after their meeting today with family it will become more clear on the differences as far as approach as we can take. Continue orders per pulmonology and infectious disease. Of course some of that may be subject to change depending on what course of treatment the patient decides.   It seems to me he is very much on the fence and not

## 2020-01-22 NOTE — PROGRESS NOTES
2225 Call placed to Dr Kristal Villela per pt request for moseley cath placement d/t extreme sob when he stands to void and urinary retention. Nurse also had conversation with patient regarding hospice and what they offered and pt is interested in talking to hospice. Order received for f/c and hospice consult. 2255 16F f/c placed with immediate r/t of 350 cc orange colored urine with sediment. Pt tolerated procedure well.

## 2020-01-22 NOTE — PROGRESS NOTES
Went with Dr David Foster as he discussed the medications with the pt. Dr David Foster dicussed with the pt the side affects of Amikacin and that is can affect the kidneys and the ears. Pt stated he understood and wanted to take the medication.   Electronically signed by Amita Stallworth RN on 1/22/20 at 5:53 PM

## 2020-01-23 NOTE — PLAN OF CARE
Problem: Falls - Risk of:  Goal: Will remain free from falls  Description  Will remain free from falls  1/23/2020 1549 by Boris Lipscomb RN  Outcome: Ongoing  1/23/2020 0410 by Jenise Norris LPN  Outcome: Ongoing  Goal: Absence of physical injury  Description  Absence of physical injury  1/23/2020 1549 by Boris Lipscomb RN  Outcome: Ongoing  1/23/2020 0410 by Jenise Norris LPN  Outcome: Ongoing     Problem: Infection:  Goal: Will remain free from infection  Description  Will remain free from infection  1/23/2020 1549 by Boris Lipscomb RN  Outcome: Ongoing  1/23/2020 0410 by Jenise Norris LPN  Outcome: Ongoing     Problem: Safety:  Goal: Free from accidental physical injury  Description  Free from accidental physical injury  1/23/2020 1549 by Boris Lipscomb RN  Outcome: Ongoing  1/23/2020 0410 by Jenise Norris LPN  Outcome: Ongoing  Goal: Free from intentional harm  Description  Free from intentional harm  1/23/2020 1549 by Boris Lipscomb RN  Outcome: Ongoing  1/23/2020 0410 by Jenise Norris LPN  Outcome: Ongoing     Problem: Daily Care:  Goal: Daily care needs are met  Description  Daily care needs are met  1/23/2020 1549 by Boris Lipscomb RN  Outcome: Ongoing  1/23/2020 0410 by Jenise Norris LPN  Outcome: Ongoing     Problem: Pain:  Goal: Patient's pain/discomfort is manageable  Description  Patient's pain/discomfort is manageable  1/23/2020 1549 by Boris Lipscomb RN  Outcome: Ongoing  1/23/2020 0410 by Jenise Norris LPN  Outcome: Ongoing     Problem: Skin Integrity:  Goal: Skin integrity will stabilize  Description  Skin integrity will stabilize  1/23/2020 1549 by Boris Lipscomb RN  Outcome: Ongoing  1/23/2020 0410 by Jenise Norris LPN  Outcome: Ongoing     Problem: Discharge Planning:  Goal: Patients continuum of care needs are met  Description  Patients continuum of care needs are met  1/23/2020 1549 by Boris Lipscomb RN  Outcome: Ongoing  1/23/2020 0410 by Jenise Norris LPN  Outcome: Ongoing  1/23/2020 0410 by Johnathan Guthrie LPN  Outcome: Ongoing     Problem: Activity:  Goal: Capacity to carry out activities will improve  Description  Capacity to carry out activities will improve  1/23/2020 1549 by Stacie Mac RN  Outcome: Ongoing  1/23/2020 0410 by Johnathan Guthrie LPN  Outcome: Ongoing  Goal: Fatigue will decrease  Description  Fatigue will decrease  1/23/2020 1549 by Stacie Mac RN  Outcome: Ongoing  1/23/2020 0410 by Johnathan Guthrie LPN  Outcome: Ongoing  Goal: Energy level will increase  Description  Energy level will increase  1/23/2020 1549 by Stacie Mac RN  Outcome: Ongoing  1/23/2020 0410 by Johnathan Guthrie LPN  Outcome: Ongoing  Goal: Ability to implement measures to reduce episodes of fatigue will improve  Description  Ability to implement measures to reduce episodes of fatigue will improve  1/23/2020 1549 by Stacie Mac RN  Outcome: Ongoing  1/23/2020 0410 by Johnathan Guthrie LPN  Outcome: Ongoing     Problem: Coping:  Goal: Ability to identify and develop effective coping behavior will improve  Description  Ability to identify and develop effective coping behavior will improve  1/23/2020 1549 by Stacie Mac RN  Outcome: Ongoing  1/23/2020 0410 by Johnathan Guthrie LPN  Outcome: Ongoing  Goal: Ability to identify and utilize available resources and services will improve  Description  Ability to identify and utilize available resources and services will improve  1/23/2020 1549 by Stacie Mac RN  Outcome: Ongoing  1/23/2020 0410 by Johnathan Guthrie LPN  Outcome: Ongoing     Problem: Risk for Impaired Skin Integrity  Goal: Tissue integrity - skin and mucous membranes  Description  Structural intactness and normal physiological function of skin and  mucous membranes.   1/23/2020 1549 by Stacie Mac RN  Outcome: Ongoing  1/23/2020 0410 by Johnathan Guthrie LPN  Outcome: Ongoing     Problem: Nutrition  Goal: Optimal nutrition therapy  Description  Nutrition Problem: Severe malnutrition, In context of chronic illness  Intervention: Food and/or Nutrient Delivery: Continue current diet, Continue current ONS  Nutritional Goals: PO 50% or more meals and ONS, wt stable or gain.         1/23/2020 1549 by Ant Odell RN  Outcome: Ongoing  1/23/2020 0410 by Vee Thompson LPN  Outcome: Ongoing     Problem: Falls - Risk of:  Goal: Will remain free from falls  Description  Will remain free from falls  1/23/2020 1549 by Ant Odell RN  Outcome: Ongoing  1/23/2020 0410 by Vee Thompson LPN  Outcome: Ongoing  Goal: Absence of physical injury  Description  Absence of physical injury  1/23/2020 1549 by Ant Odell RN  Outcome: Ongoing  1/23/2020 0410 by Vee Thompson LPN  Outcome: Ongoing     Problem: Infection:  Goal: Will remain free from infection  Description  Will remain free from infection  1/23/2020 1549 by Ant Odell RN  Outcome: Ongoing  1/23/2020 0410 by Vee Thompson LPN  Outcome: Ongoing     Problem: Safety:  Goal: Free from accidental physical injury  Description  Free from accidental physical injury  1/23/2020 1549 by Ant Odell RN  Outcome: Ongoing  1/23/2020 0410 by Vee Thompson LPN  Outcome: Ongoing  Goal: Free from intentional harm  Description  Free from intentional harm  1/23/2020 1549 by Ant Odell RN  Outcome: Ongoing  1/23/2020 0410 by Vee Thompson LPN  Outcome: Ongoing     Problem: Daily Care:  Goal: Daily care needs are met  Description  Daily care needs are met  1/23/2020 1549 by Ant Odell RN  Outcome: Ongoing  1/23/2020 0410 by Vee Thompson LPN  Outcome: Ongoing     Problem: Pain:  Goal: Patient's pain/discomfort is manageable  Description  Patient's pain/discomfort is manageable  1/23/2020 1549 by Ant Odell RN  Outcome: Ongoing  1/23/2020 0410 by Vee Thompson LPN  Outcome: Ongoing     Problem: Skin Integrity:  Goal: Skin integrity will stabilize  Description  Skin integrity will stabilize  1/23/2020 1549 by Ant Odell manageable  Description  Patient's pain/discomfort is manageable  1/23/2020 1549 by Eh Chaudhari RN  Outcome: Ongoing  1/23/2020 0410 by Yadi Caldera LPN  Outcome: Ongoing     Problem: Skin Integrity:  Goal: Skin integrity will stabilize  Description  Skin integrity will stabilize  1/23/2020 1549 by Eh Chaudhari RN  Outcome: Ongoing  1/23/2020 0410 by Yadi Caldera LPN  Outcome: Ongoing     Problem: Discharge Planning:  Goal: Patients continuum of care needs are met  Description  Patients continuum of care needs are met  1/23/2020 1549 by Eh Chaudhari RN  Outcome: Ongoing  1/23/2020 0410 by Yadi Caldera LPN  Outcome: Ongoing     Problem: Discharge Planning:  Goal: Discharged to appropriate level of care  Description  Discharged to appropriate level of care  1/23/2020 1549 by Eh Chaudhari RN  Outcome: Ongoing  1/23/2020 0410 by Yadi Caldera LPN  Outcome: Ongoing  Goal: Participates in care planning  Description  Participates in care planning  1/23/2020 1549 by Eh Chaudhari RN  Outcome: Ongoing  1/23/2020 0410 by Yadi Caldera LPN  Outcome: Ongoing     Problem: Airway Clearance - Ineffective:  Goal: Clear lung sounds  Description  Clear lung sounds  1/23/2020 1549 by Eh Chaudhari RN  Outcome: Ongoing  1/23/2020 0410 by Yadi Caldera LPN  Outcome: Ongoing  Goal: Ability to maintain a clear airway will improve  Description  Ability to maintain a clear airway will improve  1/23/2020 1549 by Eh Chaudhari RN  Outcome: Ongoing  1/23/2020 0410 by Yadi Caldera LPN  Outcome: Ongoing     Problem: Fluid Volume - Deficit:  Goal: Achieves intake and output within specified parameters  Description  Achieves intake and output within specified parameters  1/23/2020 1549 by Eh Chaudhari RN  Outcome: Ongoing  1/23/2020 0410 by Yadi Caldera LPN  Outcome: Ongoing     Problem: Gas Exchange - Impaired:  Goal: Levels of oxygenation will improve  Description  Levels of oxygenation will improve  1/23/2020 will improve  Description  Ability to identify and utilize available resources and services will improve  1/23/2020 1549 by Lida Ji RN  Outcome: Ongoing  1/23/2020 0410 by Jeremy Marquez LPN  Outcome: Ongoing     Problem: Risk for Impaired Skin Integrity  Goal: Tissue integrity - skin and mucous membranes  Description  Structural intactness and normal physiological function of skin and  mucous membranes. 1/23/2020 1549 by Lida Ji RN  Outcome: Ongoing  1/23/2020 0410 by Jeremy Marquez LPN  Outcome: Ongoing     Problem: Nutrition  Goal: Optimal nutrition therapy  Description  Nutrition Problem: Severe malnutrition, In context of chronic illness  Intervention: Food and/or Nutrient Delivery: Continue current diet, Continue current ONS  Nutritional Goals: PO 50% or more meals and ONS, wt stable or gain.         1/23/2020 1549 by Lida Ji RN  Outcome: Ongoing  1/23/2020 0410 by Jeremy Marquez LPN  Outcome: Ongoing     Problem: Falls - Risk of:  Goal: Will remain free from falls  Description  Will remain free from falls  1/23/2020 1549 by Lida Ji RN  Outcome: Ongoing  1/23/2020 0410 by Jeremy Marquez LPN  Outcome: Ongoing  Goal: Absence of physical injury  Description  Absence of physical injury  1/23/2020 1549 by Lida Ji RN  Outcome: Ongoing  1/23/2020 0410 by Jeremy Marquez LPN  Outcome: Ongoing     Problem: Infection:  Goal: Will remain free from infection  Description  Will remain free from infection  1/23/2020 1549 by Lida Ji RN  Outcome: Ongoing  1/23/2020 0410 by Jeremy Marquez LPN  Outcome: Ongoing     Problem: Safety:  Goal: Free from accidental physical injury  Description  Free from accidental physical injury  1/23/2020 1549 by Lida Ji RN  Outcome: Ongoing  1/23/2020 0410 by Jeremy Marquez LPN  Outcome: Ongoing  Goal: Free from intentional harm  Description  Free from intentional harm  1/23/2020 1549 by Lida Ji RN  Outcome: Ongoing  1/23/2020 0410 by to identify and develop effective coping behavior will improve  Description  Ability to identify and develop effective coping behavior will improve  1/23/2020 1549 by Jyoti Norman RN  Outcome: Ongoing  1/23/2020 0410 by Cristobal Patton LPN  Outcome: Ongoing  Goal: Ability to identify and utilize available resources and services will improve  Description  Ability to identify and utilize available resources and services will improve  1/23/2020 1549 by Jyoti Norman RN  Outcome: Ongoing  1/23/2020 0410 by Cristobal Patton LPN  Outcome: Ongoing     Problem: Risk for Impaired Skin Integrity  Goal: Tissue integrity - skin and mucous membranes  Description  Structural intactness and normal physiological function of skin and  mucous membranes. 1/23/2020 1549 by Jyoti Norman RN  Outcome: Ongoing  1/23/2020 0410 by Cristobal Patton LPN  Outcome: Ongoing     Problem: Nutrition  Goal: Optimal nutrition therapy  Description  Nutrition Problem: Severe malnutrition, In context of chronic illness  Intervention: Food and/or Nutrient Delivery: Continue current diet, Continue current ONS  Nutritional Goals: PO 50% or more meals and ONS, wt stable or gain.         1/23/2020 1549 by Jyoti Norman RN  Outcome: Ongoing  1/23/2020 0410 by Cristobal Patton LPN  Outcome: Ongoing

## 2020-01-23 NOTE — PROGRESS NOTES
Pulmonary and Critical Care Progress Note [address] [facility]    Patient: Phoenix Dejesus  1960   MR# [id number]   Acct# [de-identified]  01/23/20   8:11 AM  Referring Provider: Pancho Sorto MD    Chief Complaint: Shortness of breath, pneumonia  Interval history: The patient is resting comfortably in bed on 4 L NC. Currently, his cough is less than yesterday. Notes from palliative care and ID reviewed. No other aggravating or alleviating factors or associated symptoms. Medications:   amikacin (AMIKIN) IVPB, 500 mg, Intravenous, Q MWF    aminoglycoside intermittent dosing (placeholder), , Other, RX Placeholder    [COMPLETED] posaconazole, 300 mg, Oral, BID WC **AND** posaconazole, 300 mg, Oral, Daily with breakfast    furosemide, 20 mg, Oral, Daily    megestrol, 625 mg, Oral, Daily    famotidine, 20 mg, Oral, BID    azithromycin, 500 mg, Oral, Daily    moxifloxacin, 400 mg, Oral, Daily    sodium chloride flush, 10 mL, Intravenous, 2 times per day    enoxaparin, 40 mg, Subcutaneous, Daily    ethambutol, 800 mg, Oral, Daily    guaiFENesin, 1,200 mg, Oral, BID    lactobacillus, 1 capsule, Oral, Daily    rifampin, 600 mg, Oral, Daily    ipratropium-albuterol, 1 ampule, Inhalation, Q4H WA   Review of Systems: Review of Systems   Constitutional: Positive for fatigue. Negative for chills and fever. Respiratory: Positive for cough and shortness of breath. Cardiovascular: Negative for chest pain. Gastrointestinal: Negative for diarrhea, nausea and vomiting. Genitourinary: Positive for difficulty urinating. Neurological: Positive for weakness. Psychiatric/Behavioral: Positive for dysphoric mood. The patient is nervous/anxious. Physical Exam:  Blood pressure 100/68, pulse 110, temperature 99.7 °F (37.6 °C), temperature source Temporal, resp. rate 21, height 5' 9\" (1.753 m), weight 98 lb (44.5 kg), SpO2 91 %.     Intake/Output Summary (Last 24 hours) at 1/23/2020 0427  Last data filed

## 2020-01-23 NOTE — PROGRESS NOTES
Occupational Therapy      Pt. Refused OT eval efforts and stated he felt he did not want to do anything, even the evaluation. D/c OT eval orders.   Electronically signed by Yahaira Samuels OT on 1/23/2020 at 11:54 AM

## 2020-01-23 NOTE — PROGRESS NOTES
PHYPhysical Therapy  PT had been re ordered and pt was once again approached to work with PT/OT on very light tasks to begin mobility training but Pt declined stating he is too SOA.    Electronically signed by Olegario Harrell PT on 1/23/2020 at 12:21 PM

## 2020-01-23 NOTE — CARE COORDINATION
Pt is able to dc to [facility] on Friday 1/24.     [facility] P: [phone] F: [phone]    Electronically signed by Damaris Barboza on 1/23/2020 at 2:41 PM

## 2020-01-23 NOTE — PLAN OF CARE
Problem: Falls - Risk of:  Goal: Will remain free from falls  Description  Will remain free from falls  Outcome: Ongoing  Goal: Absence of physical injury  Description  Absence of physical injury  Outcome: Ongoing     Problem: Infection:  Goal: Will remain free from infection  Description  Will remain free from infection  Outcome: Ongoing     Problem: Safety:  Goal: Free from accidental physical injury  Description  Free from accidental physical injury  Outcome: Ongoing  Goal: Free from intentional harm  Description  Free from intentional harm  Outcome: Ongoing     Problem: Daily Care:  Goal: Daily care needs are met  Description  Daily care needs are met  Outcome: Ongoing     Problem: Pain:  Goal: Patient's pain/discomfort is manageable  Description  Patient's pain/discomfort is manageable  Outcome: Ongoing     Problem: Skin Integrity:  Goal: Skin integrity will stabilize  Description  Skin integrity will stabilize  Outcome: Ongoing     Problem: Discharge Planning:  Goal: Patients continuum of care needs are met  Description  Patients continuum of care needs are met  Outcome: Ongoing     Problem: Discharge Planning:  Goal: Discharged to appropriate level of care  Description  Discharged to appropriate level of care  Outcome: Ongoing  Goal: Participates in care planning  Description  Participates in care planning  Outcome: Ongoing     Problem: Airway Clearance - Ineffective:  Goal: Clear lung sounds  Description  Clear lung sounds  Outcome: Ongoing  Goal: Ability to maintain a clear airway will improve  Description  Ability to maintain a clear airway will improve  Outcome: Ongoing     Problem: Fluid Volume - Deficit:  Goal: Achieves intake and output within specified parameters  Description  Achieves intake and output within specified parameters  Outcome: Ongoing     Problem: Gas Exchange - Impaired:  Goal: Levels of oxygenation will improve  Description  Levels of oxygenation will improve  Outcome: Ongoing

## 2020-01-23 NOTE — PROGRESS NOTES
Family Medicine Progress Note    Patient:  Dawson Garibay  YOB: 1960    MRN: [id number]     Acct: [de-identified]     Admit date: 1/15/2020    Patient Seen, Chart, Consults notes, Labs, Radiology studies reviewed. Subjective: Day 8 of stay with acute exacerbation of chronic and late stage obstructive lung disease complicated by Mycobacterium infection and most recent (in last 24 hours) has had no new improvement. Remains very dyspneic with minimal exertion. Stating he cannot even get up to get dressed. Continues on a large amount of oxygen support. Esparza catheter in place for patient comfort due to his dyspnea. Continues to have productive cough for purulent sputum. Notes reviewed today from pulmonology and infectious disease. Past, Family, Social History unchanged from admission.     Diet:  DIET GENERAL;  Dietary Nutrition Supplements: Standard High Calorie Oral Supplement    Medications:  Scheduled Meds:   amikacin (AMIKIN) IVPB  500 mg Intravenous Q MWF    aminoglycoside intermittent dosing (placeholder)   Other RX Placeholder    posaconazole  300 mg Oral Daily with breakfast    furosemide  20 mg Oral Daily    megestrol  625 mg Oral Daily    famotidine  20 mg Oral BID    azithromycin  500 mg Oral Daily    moxifloxacin  400 mg Oral Daily    sodium chloride flush  10 mL Intravenous 2 times per day    enoxaparin  40 mg Subcutaneous Daily    ethambutol  800 mg Oral Daily    guaiFENesin  1,200 mg Oral BID    lactobacillus  1 capsule Oral Daily    rifampin  600 mg Oral Daily    ipratropium-albuterol  1 ampule Inhalation Q4H WA     Continuous Infusions:  PRN Meds:hydrOXYzine, HYDROcodone 5 mg - acetaminophen, sodium chloride flush, acetaminophen, albuterol sulfate HFA    Objective:    Vitals: /68   Pulse 110   Temp 99.7 °F (37.6 °C) (Temporal)   Resp 21   Ht 5' 9\" (1.753 m)   Wt 98 lb (44.5 kg)   SpO2 91%   BMI 14.47 kg/m²   24 hour intake/output:    Intake/Output Summary (Last 24 hours) at 1/23/2020 0847  Last data filed at 1/23/2020 0654  Gross per 24 hour   Intake 400 ml   Output 1775 ml   Net -1375 ml     Last 3 weights: Wt Readings from Last 3 Encounters:   01/21/20 98 lb (44.5 kg)   12/08/19 100 lb 4 oz (45.5 kg)   08/14/19 107 lb (48.5 kg)       Physical Exam:    General Appearance:  alert, cooperative and depressed affect  Skin:  negatives: mobility and turgor normal  Eyes:  No gross abnormalities. Neck:  neck- supple, no mass, non-tender  Lungs:  Breathing Pattern: rapid, shallow and use of accessory muscles, Breath sounds: diminished breath sounds- throughout and rales- right base and left base  Heart:  Heart regular rate and rhythm  Abdomen: Auscultation: Normal bowel sounds. No bruits. Palpation: No masses, tenderness or organomegally.   Extremities: 1+ edema of  bilateral lower extremities (to mid shin)  Musculoskeletal: Generalized atrophy but no focal joint abnormalities  Neurologic:  negative    CBC with Differential:    Lab Results   Component Value Date    WBC 9.1 01/21/2020    RBC 3.75 01/21/2020    HGB 9.9 01/21/2020    HCT 32.5 01/21/2020    HCT 46.5 02/09/2012     01/21/2020    PLT 90 02/09/2012    MCV 86.7 01/21/2020    MCH 26.4 01/21/2020    MCHC 30.5 01/21/2020    RDW 14.2 01/21/2020    LYMPHOPCT 7.3 01/21/2020    MONOPCT 12.8 01/21/2020    EOSPCT 0.8 02/09/2012    BASOPCT 0.8 01/21/2020    MONOSABS 1.20 01/21/2020    LYMPHSABS 0.7 01/21/2020    EOSABS 0.20 01/21/2020    BASOSABS 0.10 01/21/2020     CMP:    Lab Results   Component Value Date     01/21/2020     02/09/2012    K 4.1 01/21/2020    K 4.0 01/15/2020    K 4.0 02/09/2012    CL 95 01/21/2020     02/09/2012    CO2 25 01/21/2020    BUN 8 01/21/2020    CREATININE <0.5 01/21/2020    CREATININE 0.6 02/09/2012    LABGLOM >60 01/21/2020    GLUCOSE 109 01/21/2020    PROT 6.4 01/14/2020    PROT 5.7 09/03/2012    LABALBU 2.6 01/14/2020    LABALBU 4.4 02/09/2012    CALCIUM

## 2020-01-23 NOTE — PROGRESS NOTES
Pharmacy Note  Aminoglycoside Consult    Romana Oropeza is a 62 y.o. male ordered Amikacin for MAC; consult received from Dr. Robert Herron to manage therapy. Also receiving posaconazole, Merrem, Zithromax, Avelox.     Patient Active Problem List   Diagnosis    Family history of colon cancer    Family history of colonic polyps    Benign colon polyp    S/P colonoscopic polypectomy    Diverticulosis of colon    Loose stools    Abdominal cramping    History of colon polyps    Abnormal CT of the abdomen    Panlobular emphysema (HCC)    Hyperlipidemia    Hypertension    Nicotine dependence, cigarettes, uncomplicated    Acute exacerbation of chronic obstructive airways disease (HCC)    COPD (chronic obstructive pulmonary disease) (HCC)    Pneumonia of right lower lobe due to Pseudomonas species (HCC)    Pneumonia    Abnormal weight loss    Severe malnutrition (HCC)    COPD exacerbation ([facility])    Palliative care patient    Chronic respiratory failure with hypoxia ([facility] [address]    Community acquired pneumonia of left lower lobe of lung ([facility] [address]    Anxiety    Chronic alcoholism in remission ([facility] [address]    Dependence on supplemental oxygen    Former heavy tobacco smoker    Major depression in remission ([facility] [address]    Personal history of tobacco use, presenting hazards to health    Mycobacterium infection       Allergies:  Pcn [penicillins]     Temp max: 99.3    Recent Labs     01/21/20  0429   BUN 8       Recent Labs     01/21/20  0429   CREATININE <0.5       Recent Labs     01/21/20  0429   WBC 9.1         Intake/Output Summary (Last 24 hours) at 1/22/2020 1854  Last data filed at 1/22/2020 1426  Gross per 24 hour   Intake 160 ml   Output 1400 ml   Net -1240 ml       Culture Date Source Results   01/18/20 Respiratory Mold   01/15/20 Blood No growth          Height:   Ht Readings from Last 1 Encounters:   01/21/20 5' 9\" (1.753 m)     Weight:  Wt Readings from Last 1 Encounters:   01/21/20 98 lb (44.5 kg)     CrCl cannot be calculated (This lab value cannot be used to calculate CrCl because it is not a number: <0.5). Assessment/Plan:  Start Amikacin 500 mg IV every Monday, Wednesday, and Friday trough March 16, 2020. Level will need to be sent to [place], [place]. Thank you for the consult. Will continue to follow.     Electronically signed by Alexa Dixon [facility] on 1/22/2020 at 6:54 PM

## 2020-01-23 NOTE — PROGRESS NOTES
Pulmonary and Critical Care Progress Note [facility]    [place] [facility] [place]  1960   MR# [id number]   Acct# [de-identified]  01/19/20   11:22 AM  Referring Provider: Patrick Head MD    Chief Complaint: Shortness of breath, pneumonia    Interval history: The patient had a long visit with Dr. Vandana Spain yesterday discussing nature of infection and antibiotic options. Today he has persistent dyspnea in chest continuously, moderate, unimproved, aggravated by exertion and associated with cough productive of purulent sputum production. Discussed growth of mold in culture with Dr. Chasity Sparks. Review of Systems:   Review of Systems   Constitutional: Positive for fatigue and unexpected weight change. Respiratory: Positive for cough and shortness of breath. Cardiovascular: Negative for chest pain and leg swelling. Gastrointestinal: Negative for vomiting. Skin: Negative for rash. Physical Exam:  Vitals: /68   Pulse 84   Temp 98.5 °F (36.9 °C)   Resp 12   Ht 5' 9\" (1.753 m)   Wt 95 lb (43.1 kg)   SpO2 96%   BMI 14.03 kg/m²   24 hour intake/output:     Intake/Output Summary (Last 24 hours) at 1/19/2020 1928  Last data filed at 1/19/2020 0525      Gross per 24 hour   Intake 1020 ml   Output 650 ml   Net 370 ml       Physical Exam  Constitutional:       General: He is not in acute distress. Appearance: He is well-developed. He is ill-appearing. He is not toxic-appearing. HENT:      Head: Normocephalic. Nose: Nose normal.      Mouth/Throat:      Mouth: Mucous membranes are moist.   Eyes:      General: No scleral icterus. Neck:      Musculoskeletal: Neck supple. Cardiovascular:      Rate and Rhythm: Normal rate and regular rhythm. Heart sounds: No murmur. No friction rub. No gallop. Pulmonary:      Effort: Pulmonary effort is normal. No accessory muscle usage or respiratory distress. Breath sounds: Rhonchi and rales present. Chest:      Chest wall: No deformity. Abdominal:      General: There is no distension. Palpations: Abdomen is soft. Tenderness: There is no tenderness. Comments: scaphoid   Musculoskeletal:      Right lower leg: No edema. Left lower leg: No edema. Skin:     General: Skin is warm and dry. Findings: No erythema or rash. Neurological:      Mental Status: He is alert. Results for Dorinda Hay (MRN [id number]) as of 1/23/2020 11:30   Ref. Range 1/19/2020 04:20   Sodium Latest Ref Range: 136 - 145 mmol/L 132 (L)   Potassium Latest Ref Range: 3.5 - 5.0 mmol/L 4.2   Chloride Latest Ref Range: 98 - 111 mmol/L 96 (L)   CO2 Latest Ref Range: 22 - 29 mmol/L 25   BUN Latest Ref Range: 8 - 23 mg/dL 7 (L)   Creatinine Latest Ref Range: 0.5 - 1.2 mg/dL <0.5   Anion Gap Latest Ref Range: 7 - 19 mmol/L 11   GFR Non- Latest Ref Range: >60  >60   Glucose Latest Ref Range: 74 - 109 mg/dL 91   Calcium Latest Ref Range: 8.8 - 10.2 mg/dL 8.1 (L)   WBC Latest Ref Range: 4.8 - 10.8 K/uL 12.2 (H)   RBC Latest Ref Range: 4.70 - 6.10 M/uL 3.57 (L)   Hemoglobin Quant Latest Ref Range: 14.0 - 18.0 g/dL 9.4 (L)   Hematocrit Latest Ref Range: 42.0 - 52.0 % 30.7 (L)   MCV Latest Ref Range: 80.0 - 94.0 fL 86.0   MCH Latest Ref Range: 27.0 - 31.0 pg 26.3 (L)   MCHC Latest Ref Range: 33.0 - 37.0 g/dL 30.6 (L)   MPV Latest Ref Range: 9.4 - 12.4 fL 11.2   RDW Latest Ref Range: 11.5 - 14.5 % 14.2   Platelet Count Latest Ref Range: 130 - 400 K/uL 369     Radiograph:   My radiograph interpretation: none to review today  Pulmonary Assessment:  1. Middle lobe and right lower lobe infiltrate due to mycobacterial infection and possibly mold  2. Atypical Mycobacterium infection (M xenopi)  3. Acute on chronic hypoxemic respiratory failure, on 3 L  4. Very severe COPD worsening  5. Pulmonary Cachexia with malnutrition  6. GERD stable  7.  Severe malnutrition    Recommend:   · I encouraged pt to try to continue activity and he will need to continue

## 2020-01-24 PROBLEM — J44.9 OBSTRUCTIVE CHRONIC BRONCHITIS WITHOUT EXACERBATION (HCC): Status: ACTIVE | Noted: 2020-01-01

## 2020-01-24 PROBLEM — J44.89 OBSTRUCTIVE CHRONIC BRONCHITIS WITHOUT EXACERBATION: Status: ACTIVE | Noted: 2020-01-01

## 2020-01-24 NOTE — PLAN OF CARE
Ongoing  1/24/2020 0406 by Nigel Washington LPN  Outcome: Ongoing     Problem: Activity:  Goal: Capacity to carry out activities will improve  Description  Capacity to carry out activities will improve  1/24/2020 1533 by Javed Edwards RN  Outcome: Ongoing  1/24/2020 0406 by Nigel Washington LPN  Outcome: Ongoing  Goal: Fatigue will decrease  Description  Fatigue will decrease  1/24/2020 1533 by Javed Edwards RN  Outcome: Ongoing  1/24/2020 0406 by Nigel Washington LPN  Outcome: Ongoing  Goal: Energy level will increase  Description  Energy level will increase  1/24/2020 1533 by Javed Edwards RN  Outcome: Ongoing  1/24/2020 0406 by Nigel Washington LPN  Outcome: Ongoing  Goal: Ability to implement measures to reduce episodes of fatigue will improve  Description  Ability to implement measures to reduce episodes of fatigue will improve  1/24/2020 1533 by Javed Edwards RN  Outcome: Ongoing  1/24/2020 0406 by Nigel Washington LPN  Outcome: Ongoing     Problem: Coping:  Goal: Ability to identify and develop effective coping behavior will improve  Description  Ability to identify and develop effective coping behavior will improve  1/24/2020 1533 by Javed Edwards RN  Outcome: Ongoing  1/24/2020 0406 by Nigel Washington LPN  Outcome: Ongoing  Goal: Ability to identify and utilize available resources and services will improve  Description  Ability to identify and utilize available resources and services will improve  1/24/2020 1533 by Javed Edwards RN  Outcome: Ongoing  1/24/2020 0406 by Nigel Washington LPN  Outcome: Ongoing     Problem: Risk for Impaired Skin Integrity  Goal: Tissue integrity - skin and mucous membranes  Description  Structural intactness and normal physiological function of skin and  mucous membranes.   1/24/2020 1533 by Javed Edwards RN  Outcome: Ongoing  1/24/2020 0406 by Nigel Washington LPN  Outcome: Ongoing     Problem: Nutrition  Goal: Optimal nutrition therapy  Description  Nutrition Problem: Severe

## 2020-01-24 NOTE — PROGRESS NOTES
kg)(12/18)  · % Weight Change:  ,  5% loss past month, 11.2% in 5 months. · Ideal Body Wt: 160 lb (72.6 kg), % Ideal Body 61%  · BMI Classification: BMI <18.5 Underweight    Nutrition Interventions:   Continue current diet, Continue current ONS  Continued Inpatient Monitoring    Nutrition Evaluation:   · Evaluation: No progress toward goals   · Goals: PO 50% or more meals and ONS, wt stable or gain.     · Monitoring: Meal Intake, Supplement Intake, Skin Integrity, Weight, Pertinent Labs      Electronically signed by Thien Edwards MS, RD, LD on 1/24/20 at 10:36 AM    Contact Number: [phone]

## 2020-01-24 NOTE — PROGRESS NOTES
Spoke with Dr. Naheed Naranjo re: patient and ex-wifes desire to move today to our hospice care center and he is in agreement as well with move.

## 2020-01-24 NOTE — PROGRESS NOTES
Pulmonary and Critical Care Progress Note 400 [facility]    [place] [facility] [place]  1960   MR# [id number]   Acct# [de-identified]  01/24/20   7:38 AM  Referring Provider: Patrick Head MD    Chief Complaint: Shortness of breath, pneumonia  Interval history: The patient remains on 4 L NC. He reports that he is miserable and \"smothering\". He states \"I am not afraid to die I just don't want to smother\". He is begging for steroids. Dr. Chasity Sparks and Dr. Debbie Allen have talked this morning and we will start a course of low-dose steroids. Abril Emmanuel is adamant that he cannot be moved to the nursing home today. No other aggravating or alleviating factors or associated symptoms. Medications:   amikacin (AMIKIN) IVPB, 500 mg, Intravenous, Q MWF    aminoglycoside intermittent dosing (placeholder), , Other, RX Placeholder    [COMPLETED] posaconazole, 300 mg, Oral, BID WC **AND** posaconazole, 300 mg, Oral, Daily with breakfast    furosemide, 20 mg, Oral, Daily    megestrol, 625 mg, Oral, Daily    famotidine, 20 mg, Oral, BID    azithromycin, 500 mg, Oral, Daily    moxifloxacin, 400 mg, Oral, Daily    sodium chloride flush, 10 mL, Intravenous, 2 times per day    enoxaparin, 40 mg, Subcutaneous, Daily    ethambutol, 800 mg, Oral, Daily    guaiFENesin, 1,200 mg, Oral, BID    lactobacillus, 1 capsule, Oral, Daily    rifampin, 600 mg, Oral, Daily    ipratropium-albuterol, 1 ampule, Inhalation, Q4H WA   Review of Systems: Review of Systems   Constitutional: Positive for fatigue. Negative for chills and fever. Respiratory: Positive for cough and shortness of breath. Cardiovascular: Negative for chest pain. Gastrointestinal: Negative for diarrhea, nausea and vomiting. Genitourinary: Positive for difficulty urinating. Neurological: Positive for weakness. Psychiatric/Behavioral: Positive for dysphoric mood. The patient is nervous/anxious.       Physical Exam:  Blood pressure 107/72, pulse 118, temperature 98.5 °F (36.9 °C), temperature source Temporal, resp. rate 24, height 5' 9\" (1.753 m), weight 98 lb (44.5 kg), SpO2 (!) 87 %. Intake/Output Summary (Last 24 hours) at 1/24/2020 0738  Last data filed at 1/24/2020 0446  Gross per 24 hour   Intake 600 ml   Output 1300 ml   Net -700 ml     Physical Exam  Vitals signs and nursing note reviewed. Constitutional:       General: He is not in acute distress. Appearance: He is cachectic. He is ill-appearing. Interventions: Nasal cannula in place. HENT:      Head: Normocephalic and atraumatic. Eyes:      Pupils: Pupils are equal, round, and reactive to light. Neck:      Musculoskeletal: Normal range of motion and neck supple. Cardiovascular:      Rate and Rhythm: Normal rate and regular rhythm. Heart sounds: Normal heart sounds. Pulmonary:      Effort: Prolonged expiration present. Breath sounds: Decreased breath sounds and rhonchi (few) present. No wheezing or rales. Comments: Gray-colored sputum  Chest:      Chest wall: No tenderness. Abdominal:      General: Bowel sounds are normal.      Palpations: Abdomen is soft. Musculoskeletal: Normal range of motion. General: Swelling (Bilateral ankles) present. Skin:     General: Skin is warm and dry. Neurological:      Mental Status: He is alert and oriented to person, place, and time. Psychiatric:         Mood and Affect: Mood is anxious and depressed. Affect is flat. Behavior: Behavior normal.         Thought Content: Thought content normal.         Cognition and Memory: Cognition and memory normal.       No results for input(s): WBC, RBC, HGB, HCT, PLT, MCV, MCH, MCHC, RDW, NRBC, SEGSPCT, BANDSPCT in the last 72 hours. No results for input(s): NA, K, CL, CO2, BUN, CREATININE, CALCIUM, GLUCOSE, PHART, AGT7ELL, PO2ART, ZQT9FOL, E8NSDRKB, BEART, AST, ALT, ALKPHOS, BILITOT, MG, CALCIUM, PHOS, BNP, TROPONINI, LACTA, INR, TSH in the last 72 hours.    No

## 2020-01-24 NOTE — PROGRESS NOTES
Spoke with Dr. Milton Ruiz for discharge to our hospice care center today. He will enter orders asap.

## 2020-01-24 NOTE — PLAN OF CARE
Problem: Falls - Risk of:  Goal: Will remain free from falls  Description  Will remain free from falls  1/24/2020 0406 by Elver Ji LPN  Outcome: Ongoing  1/23/2020 1549 by Erlinda Balbuena RN  Outcome: Ongoing  Goal: Absence of physical injury  Description  Absence of physical injury  1/24/2020 0406 by Elver Ji LPN  Outcome: Ongoing  1/23/2020 1549 by Erlinda Balbuena RN  Outcome: Ongoing     Problem: Infection:  Goal: Will remain free from infection  Description  Will remain free from infection  1/24/2020 0406 by Elver Ji LPN  Outcome: Ongoing  1/23/2020 1549 by Erlinda Balbuena RN  Outcome: Ongoing     Problem: Safety:  Goal: Free from accidental physical injury  Description  Free from accidental physical injury  1/24/2020 0406 by Elver Ji LPN  Outcome: Ongoing  1/23/2020 1549 by Erlinda Balbuena RN  Outcome: Ongoing  Goal: Free from intentional harm  Description  Free from intentional harm  1/24/2020 0406 by Elver Ji LPN  Outcome: Ongoing  1/23/2020 1549 by Erlinda Balbuena RN  Outcome: Ongoing     Problem: Daily Care:  Goal: Daily care needs are met  Description  Daily care needs are met  1/24/2020 0406 by Elver Ji LPN  Outcome: Ongoing  1/23/2020 1549 by Erlinda Balbuena RN  Outcome: Ongoing     Problem: Pain:  Goal: Patient's pain/discomfort is manageable  Description  Patient's pain/discomfort is manageable  1/24/2020 0406 by Elver Ji LPN  Outcome: Ongoing  1/23/2020 1549 by Erlinda Balbuena RN  Outcome: Ongoing     Problem: Skin Integrity:  Goal: Skin integrity will stabilize  Description  Skin integrity will stabilize  1/24/2020 0406 by Elver Ji LPN  Outcome: Ongoing  1/23/2020 1549 by Erlinda Balbuena RN  Outcome: Ongoing     Problem: Discharge Planning:  Goal: Patients continuum of care needs are met  Description  Patients continuum of care needs are met  1/24/2020 0406 by Elver Ji LPN  Outcome: Ongoing  1/23/2020 1549 by Erlinda Balbuena RN  Outcome: Ongoing     Problem: Discharge Planning:  Goal: Discharged to appropriate level of care  Description  Discharged to appropriate level of care  1/24/2020 0406 by Nigel Washington LPN  Outcome: Ongoing  1/23/2020 1549 by Javed Edwards RN  Outcome: Ongoing  Goal: Participates in care planning  Description  Participates in care planning  1/24/2020 0406 by Nigel Washington LPN  Outcome: Ongoing  1/23/2020 1549 by Javed Edwards RN  Outcome: Ongoing     Problem: Airway Clearance - Ineffective:  Goal: Clear lung sounds  Description  Clear lung sounds  1/24/2020 0406 by Nigel Washington LPN  Outcome: Ongoing  1/23/2020 1549 by Javed Edwards RN  Outcome: Ongoing  Goal: Ability to maintain a clear airway will improve  Description  Ability to maintain a clear airway will improve  1/24/2020 0406 by Nigel Washington LPN  Outcome: Ongoing  1/23/2020 1549 by Javed Edwards RN  Outcome: Ongoing     Problem: Fluid Volume - Deficit:  Goal: Achieves intake and output within specified parameters  Description  Achieves intake and output within specified parameters  1/24/2020 0406 by Nigel Washington LPN  Outcome: Ongoing  1/23/2020 1549 by Javed Edwards RN  Outcome: Ongoing     Problem: Gas Exchange - Impaired:  Goal: Levels of oxygenation will improve  Description  Levels of oxygenation will improve  1/24/2020 0406 by Nigel Washington LPN  Outcome: Ongoing  1/23/2020 1549 by Javed Edwards RN  Outcome: Ongoing     Problem: Hyperthermia:  Goal: Ability to maintain a body temperature in the normal range will improve  Description  Ability to maintain a body temperature in the normal range will improve  1/24/2020 0406 by Nigel Washington LPN  Outcome: Ongoing  1/23/2020 1549 by Javed Edwards RN  Outcome: Ongoing     Problem: Tobacco Use:  Goal: Will participate in inpatient tobacco-use cessation counseling  Description  Will participate in inpatient tobacco-use cessation counseling  1/24/2020 0406 by Nigel Washington LPN  Outcome: Ongoing  1/23/2020 1549 by Goldy Marquez RN  Outcome: Ongoing     Problem: Activity:  Goal: Capacity to carry out activities will improve  Description  Capacity to carry out activities will improve  1/24/2020 0406 by Reina Lanier LPN  Outcome: Ongoing  1/23/2020 1549 by Goldy Marquez RN  Outcome: Ongoing  Goal: Fatigue will decrease  Description  Fatigue will decrease  1/24/2020 0406 by Reina Lanier LPN  Outcome: Ongoing  1/23/2020 1549 by Goldy Marquez RN  Outcome: Ongoing  Goal: Energy level will increase  Description  Energy level will increase  1/24/2020 0406 by Reina Lanier LPN  Outcome: Ongoing  1/23/2020 1549 by Goldy Marquez RN  Outcome: Ongoing  Goal: Ability to implement measures to reduce episodes of fatigue will improve  Description  Ability to implement measures to reduce episodes of fatigue will improve  1/24/2020 0406 by Reina Lanier LPN  Outcome: Ongoing  1/23/2020 1549 by Goldy Marquez RN  Outcome: Ongoing     Problem: Coping:  Goal: Ability to identify and develop effective coping behavior will improve  Description  Ability to identify and develop effective coping behavior will improve  1/24/2020 0406 by Reina Lanier LPN  Outcome: Ongoing  1/23/2020 1549 by Goldy Marquez RN  Outcome: Ongoing  Goal: Ability to identify and utilize available resources and services will improve  Description  Ability to identify and utilize available resources and services will improve  1/24/2020 0406 by Reina Lanier LPN  Outcome: Ongoing  1/23/2020 1549 by Goldy Marquez RN  Outcome: Ongoing     Problem: Risk for Impaired Skin Integrity  Goal: Tissue integrity - skin and mucous membranes  Description  Structural intactness and normal physiological function of skin and  mucous membranes.   1/24/2020 0406 by Reina Lanier LPN  Outcome: Ongoing  1/23/2020 1549 by Goldy Marquez RN  Outcome: Ongoing     Problem: Nutrition  Goal: Optimal nutrition therapy  Description  Nutrition Problem: Severe malnutrition, In context of chronic illness  Intervention: Food and/or Nutrient Delivery: Continue current diet, Continue current ONS  Nutritional Goals: PO 50% or more meals and ONS, wt stable or gain.         1/24/2020 0406 by Paul Campos LPN  Outcome: Ongoing  1/23/2020 1549 by Jesse Wilkerson RN  Outcome: Ongoing

## 2020-01-24 NOTE — PROGRESS NOTES
Follow up visit:  Pt reports a bad night and morning. His oxygen saturation has been upper 80's. He feels like he is smothering. After physician visits this morning he was started on steroids, and has received IV morphine and ativan. At this time he says he feels s/w better. We discussed his plan again to move to the  and then later engage hospice. He says he just can't go today. I also spoke to him about the Hospice care center, if he continues to have symptoms, he could be a candidate to move downstairs and begin hospice care here. At first he says yes, then he states, \"Will all my doctors come and see me there? Because I want to keep seeing them. \"  His nurse is present and we try and explain that Dr. Monty Torres can continue to follow as his primary, but that his specialist would make recommendations. Reviewed that if he goes the the  it would be the same. He is undecided and says, \"I'd better use the free days at the nursing home first.\"  He also wants his physicians to give him their recommendation about inpt care. Emotional support given.   I left him to discuss with family and his care team.      Electronically signed by Jeanna Amado RN on 1/24/2020 at 1:09 PM

## 2020-01-24 NOTE — PROGRESS NOTES
<0.2 2020    ALKPHOS 96 2020    ALKPHOS 85 2012    AST 25 2020    ALT 27 2020     Last 3 Troponin:    Lab Results   Component Value Date    TROPONINI 0.02 01/15/2020    TROPONINI 0.01 2019    TROPONINI 0.03 2019     Urine Culture:  No components found for: CURINE  Blood Culture:  No components found for: CBLOOD, CFUNGUSBL  Stool Culture:  No components found for: CSTOOL    Assessment:    Principal Problem:    Pneumonia  Active Problems:    Panlobular emphysema (HCC)    Hyperlipidemia    Hypertension    Nicotine dependence, cigarettes, uncomplicated    Acute exacerbation of chronic obstructive airways disease (HCC)    Severe malnutrition (HCC)    Chronic respiratory failure with hypoxia (HCC)    Chronic alcoholism in remission ([facility] [address]    Dependence on supplemental oxygen    Former heavy tobacco smoker    Major depression in remission ([facility]    Mycobacterium infection  Resolved Problems:    * No resolved hospital problems. *          Plan:  The patient is not making much progress and consents some both concern and frustration on his part. His biggest fear is obviously being in any pain and potentially having any suffering. I had a lengthy talk with him today. It appears his father while in the nursing facility several years ago  from suffocation after aspirating on some food that his wife was feeding him. The patient states he witnessed that and \"it was awful and I cannot get it out of my mind\". That certainly gives me some insight into his current concerns. Unfortunately it seems as though we are doing most everything we can. I discussed some other pulmonary toilet measures such as the vest and the flutter valve. He states those in the past have actually made his breathing a little more difficult. I discussed with infectious disease earlier today and the possibility that steroids may be recommended just for palliation of his symptoms.   He continues to complain of pain.  He is obviously overall anxious. No there are risks for adjusting any medications that can be soporific such as opioid pain medications and benzodiazepine anoxia lytics, however subjectively he is not doing well. I have added those to his regimen. He states he is not ready to go to the nursing home today and I somewhat agree given the overall picture. Appreciate infectious disease and pulmonary's thoughtful assistance in his care. His prognosis is poor. Palliative care on board. I think patient understands the severity of his illness however at times I am unsure. Continue hospitalization and hopefully stabilize a little more before sending to skilled facility. Greater than 25 minutes spent in completion of patient care for Mr. SARAH DE JESUS.     Electronically signed by Lincoln Carmona MD on 1/24/2020 at 8:40 AM

## 2020-01-24 NOTE — PROGRESS NOTES
consideration to try and help maximize any treatment of his severe chronic obstructive pulmonary disease and hopefully offer him at least a little bit of quality/relief.     Recommendations:  Discussed with pulmonary and family medicine  Continue current antimicrobial treatment  No objection to addition of steroid therapy as part of palliative treatment  Continue supportive care  Agree with holding on nursing home transfer until we can see if we can gain any traction and help him feel somewhat improved    Toby Schmitt MD

## 2020-01-24 NOTE — PROGRESS NOTES
Gerardo Godinez with Dr. Radha Alatorre notified and is in agreement with move to our hospice care center today.

## 2020-01-24 NOTE — DISCHARGE SUMMARY
7 Days:  Xr Chest Standard (2 Vw)    Result Date: 1/20/2020  Progressive infiltrate right lung. Small focal infiltrate in the left lower lobe. Small bibasilar pleural effusion. Signed by Dr Jackie Polo on 1/20/2020 10:37 AM    Xr Chest 1 Vw    Result Date: 1/23/2020  Persistent infiltrate, more severely involving the right lung. Small bibasilar pleural effusion. Signed by Dr Jackie Polo on 1/23/2020 3:12 PM      Discharge Plan   Disposition: Inpatient hospice    Provider Follow-Up:   Ang Singh MD  [address] [place] [address] [address] [address]  [phone]          [address]  [phone]           Hospital/Incidental Findings Requiring Follow-Up:  Not applicable    Patient Instructions   Diet: regular diet    Activity: activity as tolerated    Other Instructions:   None    Discharge Medications         Medication List      ASK your doctor about these medications    Advair -21 MCG/ACT inhaler  Generic drug:  fluticasone-salmeterol     albuterol sulfate  (90 Base) MCG/ACT inhaler  Commonly known as:  Ventolin HFA  Inhale 2 puffs into the lungs every 6 hours as needed for Wheezing     azithromycin 500 MG tablet  Commonly known as:  Bethene Helm 500 MCG tablet  Generic drug:  Roflumilast     diclofenac 50 MG EC tablet  Commonly known as:  VOLTAREN  Take 1 tablet by mouth 3 times daily as needed for Pain     ethambutol 400 MG tablet  Commonly known as:  MYAMBUTOL     guaiFENesin 600 MG extended release tablet  Commonly known as:  MUCINEX  Take 1 tablet by mouth 2 times daily     lactobacillus Caps capsule  Take 1 capsule by mouth daily     moxifloxacin 400 MG tablet  Commonly known as:  AVELOX  Take 1 tablet by mouth daily  Ask about: Should I take this medication?      MULTI-VITAMIN PO     OXYGEN  Inhale 2 L into the lungs continuous     RIFAMPIN PO     SPIRIVA HANDIHALER IN            Electronically signed by Ang Singh MD on 1/24/20 at 5:28 PM

## 2020-01-25 PROBLEM — I27.23 PULMONARY HYPERTENSION DUE TO LUNG DISEASES AND HYPOXIA (HCC): Status: ACTIVE | Noted: 2020-01-01

## 2020-01-27 NOTE — CARE COORDINATION
[facility] Transitions Follow Up Call    2020    Patient: Jerome Salcedo  Patient : 1960   MRN: [id number]  Reason for Admission:   Discharge Date: 20 RARS: Readmission Risk Score: 12    Follow Up:  Patient currently admitted. Will follow up when patient is discharged. No future appointments.     Thomas Ware RN

## 2020-02-18 LAB
AFB CULTURE (MYCOBACTERIA): ABNORMAL
AFB SMEAR: ABNORMAL
ORGANISM: ABNORMAL

## 2020-02-27 LAB
AFB CULTURE (MYCOBACTERIA): ABNORMAL
AFB SMEAR: ABNORMAL
ORGANISM: ABNORMAL

## 2020-03-30 LAB
Lab: ABNORMAL
MYCOBACTERIUM SPEC CULT: ABNORMAL
NIGHT BLUE STAIN TISS: ABNORMAL